# Patient Record
Sex: FEMALE | Race: WHITE | NOT HISPANIC OR LATINO | Employment: OTHER | URBAN - METROPOLITAN AREA
[De-identification: names, ages, dates, MRNs, and addresses within clinical notes are randomized per-mention and may not be internally consistent; named-entity substitution may affect disease eponyms.]

---

## 2018-07-19 ENCOUNTER — OFFICE VISIT CONVERTED (OUTPATIENT)
Dept: GASTROENTEROLOGY | Facility: CLINIC | Age: 63
End: 2018-07-19
Attending: INTERNAL MEDICINE

## 2019-02-06 ENCOUNTER — OFFICE VISIT CONVERTED (OUTPATIENT)
Dept: GASTROENTEROLOGY | Facility: CLINIC | Age: 64
End: 2019-02-06
Attending: INTERNAL MEDICINE

## 2019-06-03 ENCOUNTER — HOSPITAL ENCOUNTER (OUTPATIENT)
Dept: GASTROENTEROLOGY | Facility: HOSPITAL | Age: 64
Setting detail: HOSPITAL OUTPATIENT SURGERY
Discharge: HOME | End: 2019-06-03
Attending: INTERNAL MEDICINE

## 2019-07-03 ENCOUNTER — HOSPITAL ENCOUNTER (OUTPATIENT)
Dept: GENERAL RADIOLOGY | Facility: HOSPITAL | Age: 64
Discharge: HOME | End: 2019-07-03
Attending: OBSTETRICS & GYNECOLOGY

## 2019-11-25 ENCOUNTER — HOSPITAL ENCOUNTER (OUTPATIENT)
Dept: LAB | Facility: HOSPITAL | Age: 64
Discharge: HOME | End: 2019-11-25
Attending: INTERNAL MEDICINE

## 2019-11-25 LAB
25(OH)D3 SERPL-MCNC: 19.7 NG/ML (ref 30–100)
CALCIUM SERPL-MCNC: 9.3 MG/DL (ref 8.7–10.4)
CREAT UR-MCNC: 0.84 MG/DL (ref 0.5–0.9)
TSH SERPL-ACNC: 1.19 M[IU]/L (ref 0.27–4.2)

## 2020-02-10 ENCOUNTER — HOSPITAL ENCOUNTER (OUTPATIENT)
Dept: LAB | Facility: HOSPITAL | Age: 65
Discharge: HOME | End: 2020-02-10
Attending: INTERNAL MEDICINE

## 2020-02-10 LAB
25(OH)D3 SERPL-MCNC: 24.3 NG/ML (ref 30–100)
CALCIUM SERPL-MCNC: 9.1 MG/DL (ref 8.7–10.4)
CREAT UR-MCNC: 0.86 MG/DL (ref 0.5–0.9)

## 2020-09-10 ENCOUNTER — HOSPITAL ENCOUNTER (OUTPATIENT)
Dept: LAB | Facility: HOSPITAL | Age: 65
Discharge: HOME | End: 2020-09-10
Attending: INTERNAL MEDICINE

## 2020-09-10 LAB
25(OH)D3 SERPL-MCNC: 36.5 NG/ML (ref 30–100)
CALCIUM SERPL-MCNC: 8.8 MG/DL (ref 8.7–10.4)
CREAT UR-MCNC: 0.89 MG/DL (ref 0.5–0.9)

## 2021-01-21 ENCOUNTER — TELEPHONE CONVERTED (OUTPATIENT)
Dept: GASTROENTEROLOGY | Facility: CLINIC | Age: 66
End: 2021-01-21
Attending: NURSE PRACTITIONER

## 2021-02-27 ENCOUNTER — HOSPITAL ENCOUNTER (OUTPATIENT)
Dept: PREADMISSION TESTING | Facility: HOSPITAL | Age: 66
Discharge: HOME | End: 2021-02-27
Attending: INTERNAL MEDICINE

## 2021-02-27 LAB — SARS-COV-2 RNA SPEC QL NAA+PROBE: NOT DETECTED

## 2021-03-02 ENCOUNTER — HOSPITAL ENCOUNTER (OUTPATIENT)
Dept: LAB | Facility: HOSPITAL | Age: 66
Discharge: HOME | End: 2021-03-02
Attending: INTERNAL MEDICINE

## 2021-03-03 LAB
25(OH)D3 SERPL-MCNC: 38.5 NG/ML (ref 30–100)
CALCIUM SERPL-MCNC: 8.7 MG/DL (ref 8.7–10.4)
CREAT UR-MCNC: 0.91 MG/DL (ref 0.5–0.9)

## 2021-03-04 ENCOUNTER — HOSPITAL ENCOUNTER (OUTPATIENT)
Dept: GASTROENTEROLOGY | Facility: HOSPITAL | Age: 66
Setting detail: HOSPITAL OUTPATIENT SURGERY
Discharge: HOME | End: 2021-03-04
Attending: INTERNAL MEDICINE

## 2021-05-14 NOTE — PROGRESS NOTES
Progress Note      Patient Name: Jana Schoenbaechler   Patient ID: [id number]   Sex: Female   YOB: 1955    Primary Care Provider: Chucho Colon MD    Visit Date: January 21, 2021    Provider: KEILA Cannon   [facility]   Location Address: [address] [place], [place] [address]  [id number]   Location Phone: [phone]          Chief Complaint  · Chowdary's Esophagus  · Colon Polyps  · IBS   · Reflux      History Of Present Illness  TELEHEALTH TELEPHONE VISIT  Chief Complaint: Chowdary's Esophagus, reflex, IBS   Jana I. Schoenbaechler is a 65 year old /White female who is presenting for evaluation via telehealth telephone visit. Verbal consent obtained before beginning visit.   Provider spent 11 minutes with the patient during the telehealth visit.   The following staff were present during this visit: Adeola Conn MA   Past Medical History/ Overview of Patient Symptoms     65-year-old female with a history of Chowdary's esophagus, colon polyps, reflux, IBS returns for follow-up after period of long absence.  She feels that her reflux is controlled with pantoprazole 40 mg daily.  She does have intermittent dysphagia that occurs with both solids and liquids.  She denies weight loss.  The dysphagia occurs a few times a week.  She has tried to quit smoking, and she now vapes.  She denies nausea, vomiting, and abdominal pain.  She is taking hyoscyamine, which helps with her spasms.  Review of her colonoscopy 06/2019 by Dr. Maurer revealed grade 1 internal hemorrhoids and a 4 mm hyperplastic polyp removed from the rectum. Recommend repeat colonoscopy 2024. EGD 08/2018 by Dr. Maurer revealed a small hiatal hernia, Chowdary's esophagus, and reflux esophagitis. Recommend repeat colonoscopy 2021.         Past Medical History  Chowdary's esophagus; Hiatal Hernia; Irritable bowel syndrome         Past Surgical History  Arthroscopic knee surgery, left;  Cholecstectomy; Colonoscopy; EGD         Medication List  hyoscyamine sulfate 0.125 mg sublingual tablet, sublingual; PANTOPRAZOLE SOD DR 40 MG TAB; Protonix 40 mg oral tablet,delayed release (DR/EC)         Allergy List  NO KNOWN DRUG ALLERGIES       Allergies Reconciled  Family Medical History  - No Family History of Colorectal Cancer         Social History  Alcohol (Former); Tobacco (Current every day)         Review of Systems  · Constitutional  o Denies  o : chills, fever  · Cardiovascular  o Denies  o : chest pain  · Respiratory  o Denies  o : shortness of breath  · Gastrointestinal  o Denies  o : nausea, vomiting, dysphagia  · Endocrine  o Denies  o : weight gain, weight loss          Assessment  · Chowdary's esophagus     530.85  · Colonic Polyps, Personal History of     V12.72  · Dysphagia     787.20/R13.10  · Gastroesophageal Reflux     530.81/K21.9  · Irritable Bowel Syndrome     564.1/K58.9      Plan  · Orders  o Physician Telephone Evaluation, 11-20 minutes (57303) - 530.85, 530.81/K21.9, 564.1/K58.9, 787.20/R13.10 - 01/21/2021  o Consent for Esophagogastrodudodenoscopy (EGD) with dilatation -Possible risk/complications, benefits, and alternatives to surgical or invasive procedure have been explained to patient and/or legal gaurdian. -Patient has been evaluated and can tolerate anesthesia and/or sedation. Risk, benefits, and alternatives to anesthesia and sedation have been explained to patient and/or legal gaurdian. (80985) - 530.85, 530.81/K21.9, 787.20/R13.10 - 01/21/2021  · Medications  o pantoprazole 40 mg oral tablet,delayed release (DR/EC)   SIG: take 1 tablet (40 mg) by oral route once daily for 90 days   DISP: (90) Tablet with 3 refills  Prescribed on 01/21/2021     o hyoscyamine sulfate 0.125 mg sublingual tablet, sublingual   SIG: DISSOLVE ONE TABLET UNDER THE TONGUE BEFORE MEAL(S) AND AT BEDTIME AS NEEDED for 30 days   DISP: (120) Capsule with 5 refills  Refilled on 01/21/2021      o PANTOPRAZOLE SOD DR 40 MG TAB   SIG: TAKE 1 TABLET BY MOUTH EVERY DAY   DISP: (30) Tablet with 0 refills  Discontinued on 01/21/2021     o Medications have been Reconciled  o Transition of Care or Provider Policy  · Instructions  o 65-year-old female with a history of Chowdary's esophagus, colon polyps, reflux, and IBS agrees to telehealth visit. She states that she has had dysphagia this been present for many years. She is to continue her pantoprazole 40 mg daily and hyoscyamine. We have discussed the need for an EGD due to her history of Chowdary's. She has been informed that Covid testing is required prior to the procedure. The patient is agreeable to proceed with scheduling an EGD            Electronically Signed by: KEILA Cannon -Author on January 21, 2021 08:20:29 AM  Electronically Co-signed by: Vito Maurer MD -Reviewer on February 9, 2021 09:02:05 PM

## 2021-05-16 VITALS
RESPIRATION RATE: 12 BRPM | OXYGEN SATURATION: 94 % | DIASTOLIC BLOOD PRESSURE: 75 MMHG | SYSTOLIC BLOOD PRESSURE: 168 MMHG | BODY MASS INDEX: 27.38 KG/M2 | HEIGHT: 66 IN | WEIGHT: 170.37 LBS | HEART RATE: 55 BPM

## 2021-05-16 VITALS
BODY MASS INDEX: 28.65 KG/M2 | DIASTOLIC BLOOD PRESSURE: 70 MMHG | SYSTOLIC BLOOD PRESSURE: 186 MMHG | OXYGEN SATURATION: 100 % | RESPIRATION RATE: 12 BRPM | WEIGHT: 178.25 LBS | HEIGHT: 66 IN | HEART RATE: 44 BPM

## 2021-05-23 ENCOUNTER — TRANSCRIBE ORDERS (OUTPATIENT)
Dept: ADMINISTRATIVE | Facility: HOSPITAL | Age: 66
End: 2021-05-23

## 2021-05-23 DIAGNOSIS — Z78.0 POST-MENOPAUSE: Primary | ICD-10-CM

## 2021-07-07 ENCOUNTER — HOSPITAL ENCOUNTER (OUTPATIENT)
Dept: BONE DENSITY | Facility: HOSPITAL | Age: 66
Discharge: HOME | End: 2021-07-07
Admitting: INTERNAL MEDICINE

## 2021-07-07 DIAGNOSIS — Z78.0 POST-MENOPAUSE: ICD-10-CM

## 2021-07-07 PROCEDURE — 77080 DXA BONE DENSITY AXIAL: CPT

## 2021-07-07 PROCEDURE — 77080 DXA BONE DENSITY AXIAL: CPT | Performed by: RADIOLOGY

## 2021-09-08 ENCOUNTER — TRANSCRIBE ORDERS (OUTPATIENT)
Dept: LAB | Facility: HOSPITAL | Age: 66
End: 2021-09-08

## 2021-09-08 ENCOUNTER — LAB (OUTPATIENT)
Dept: LAB | Facility: HOSPITAL | Age: 66
End: 2021-09-08

## 2021-09-08 DIAGNOSIS — M81.0 SENILE OSTEOPOROSIS: Primary | ICD-10-CM

## 2021-09-08 DIAGNOSIS — Z79.899 ENCOUNTER FOR LONG-TERM (CURRENT) USE OF OTHER MEDICATIONS: ICD-10-CM

## 2021-09-08 DIAGNOSIS — M81.0 SENILE OSTEOPOROSIS: ICD-10-CM

## 2021-09-08 LAB
25(OH)D3 SERPL-MCNC: 48 NG/ML (ref 30–100)
CALCIUM SPEC-SCNC: 8.5 MG/DL (ref 8.6–10.5)
CREAT SERPL-MCNC: 1.04 MG/DL (ref 0.57–1)
GFR SERPL CREATININE-BSD FRML MDRD: 53 ML/MIN/1.73

## 2021-09-08 PROCEDURE — 82565 ASSAY OF CREATININE: CPT

## 2021-09-08 PROCEDURE — 36415 COLL VENOUS BLD VENIPUNCTURE: CPT

## 2021-09-08 PROCEDURE — 82306 VITAMIN D 25 HYDROXY: CPT

## 2021-09-08 PROCEDURE — 82310 ASSAY OF CALCIUM: CPT

## 2021-10-25 ENCOUNTER — APPOINTMENT (OUTPATIENT)
Dept: CARDIOLOGY | Facility: HOSPITAL | Age: 66
End: 2021-10-25

## 2021-10-25 ENCOUNTER — HOSPITAL ENCOUNTER (INPATIENT)
Facility: HOSPITAL | Age: 66
LOS: 2 days | Discharge: HOME | End: 2021-10-27
Attending: EMERGENCY MEDICINE | Admitting: INTERNAL MEDICINE

## 2021-10-25 ENCOUNTER — APPOINTMENT (OUTPATIENT)
Dept: GENERAL RADIOLOGY | Facility: HOSPITAL | Age: 66
End: 2021-10-25

## 2021-10-25 ENCOUNTER — APPOINTMENT (OUTPATIENT)
Dept: CT IMAGING | Facility: HOSPITAL | Age: 66
End: 2021-10-25

## 2021-10-25 DIAGNOSIS — R07.9 CHEST PAIN IN ADULT: Primary | ICD-10-CM

## 2021-10-25 DIAGNOSIS — I50.21 ACUTE HFREF (HEART FAILURE WITH REDUCED EJECTION FRACTION) (HCC): ICD-10-CM

## 2021-10-25 DIAGNOSIS — I44.2 THIRD DEGREE HEART BLOCK (HCC): ICD-10-CM

## 2021-10-25 DIAGNOSIS — I44.2 COMPLETE HEART BLOCK (HCC): ICD-10-CM

## 2021-10-25 PROBLEM — I10 ESSENTIAL HYPERTENSION: Status: ACTIVE | Noted: 2021-10-25

## 2021-10-25 LAB
ALBUMIN SERPL-MCNC: 4.3 G/DL (ref 3.5–5.2)
ALBUMIN/GLOB SERPL: 1.4 G/DL
ALP SERPL-CCNC: 62 U/L (ref 39–117)
ALT SERPL W P-5'-P-CCNC: 20 U/L (ref 1–33)
ANION GAP SERPL CALCULATED.3IONS-SCNC: 9.7 MMOL/L (ref 5–15)
AST SERPL-CCNC: 20 U/L (ref 1–32)
BASOPHILS # BLD AUTO: 0.05 10*3/MM3 (ref 0–0.2)
BASOPHILS NFR BLD AUTO: 0.8 % (ref 0–1.5)
BH CV ECHO MEAS - AO MAX PG: 15 MMHG
BH CV ECHO MEAS - AO MEAN PG: 6 MMHG
BH CV ECHO MEAS - AO ROOT DIAM: 2.7 CM
BH CV ECHO MEAS - AO V2 MAX: 196 CM/SEC
BH CV ECHO MEAS - AO V2 VTI: 45 CM
BH CV ECHO MEAS - AVA PLANIMETRY TRACED: 1.7 CM2
BH CV ECHO MEAS - EDV(MOD-SP2): 104 ML
BH CV ECHO MEAS - EDV(MOD-SP4): 141 ML
BH CV ECHO MEAS - ESV(MOD-SP2): 65 ML
BH CV ECHO MEAS - ESV(MOD-SP4): 66 ML
BH CV ECHO MEAS - IVSD: 1.6 CM
BH CV ECHO MEAS - LA DIMENSION(2D): 4.5 CM
BH CV ECHO MEAS - LV MAX PG: 4 MMHG
BH CV ECHO MEAS - LV MEAN PG: 2 MMHG
BH CV ECHO MEAS - LV V1 MAX: 106 CM/SEC
BH CV ECHO MEAS - LV V1 VTI: 24 CM
BH CV ECHO MEAS - LVIDD: 4.7 CM
BH CV ECHO MEAS - LVIDS: 3.1 CM
BH CV ECHO MEAS - LVOT DIAM: 2 CM
BH CV ECHO MEAS - LVPWD: 1.3 CM
BH CV ECHO MEAS - MV MAX PG: 11 MMHG
BH CV ECHO MEAS - MV MEAN PG: 3 MMHG
BH CV ECHO MEAS - MV P1/2T: 81 MSEC
BH CV ECHO MEAS - MV V2 VTI: 62 CM
BH CV ECHO MEAS - MVA(P1/2T): 2.7 CM2
BH CV ECHO MEAS - RAP SYSTOLE: 3 MMHG
BH CV ECHO MEAS - RVDD: 2.3 CM
BH CV ECHO MEAS - RVSP: 60 MMHG
BH CV ECHO MEAS - TR MAX PG: 57 MMHG
BH CV ECHO MEAS - TR MAX VEL: 376 CM/SEC
BILIRUB SERPL-MCNC: 0.7 MG/DL (ref 0–1.2)
BUN SERPL-MCNC: 19 MG/DL (ref 8–23)
BUN/CREAT SERPL: 18.1 (ref 7–25)
CALCIUM SPEC-SCNC: 8.8 MG/DL (ref 8.6–10.5)
CHLORIDE SERPL-SCNC: 107 MMOL/L (ref 98–107)
CK MB SERPL-CCNC: <1 NG/ML
CK SERPL-CCNC: 39 U/L (ref 20–180)
CO2 SERPL-SCNC: 22.3 MMOL/L (ref 22–29)
CREAT SERPL-MCNC: 1.05 MG/DL (ref 0.57–1)
D DIMER PPP FEU-MCNC: 0.47 MG/L (FEU) (ref 0–0.59)
DEPRECATED RDW RBC AUTO: 40.4 FL (ref 37–54)
EOSINOPHIL # BLD AUTO: 0.2 10*3/MM3 (ref 0–0.4)
EOSINOPHIL NFR BLD AUTO: 3.1 % (ref 0.3–6.2)
ERYTHROCYTE [DISTWIDTH] IN BLOOD BY AUTOMATED COUNT: 12.8 % (ref 12.3–15.4)
GFR SERPL CREATININE-BSD FRML MDRD: 53 ML/MIN/1.73
GLOBULIN UR ELPH-MCNC: 3 GM/DL
GLUCOSE SERPL-MCNC: 97 MG/DL (ref 65–99)
HCT VFR BLD AUTO: 44.2 % (ref 34–46.6)
HGB BLD-MCNC: 14.7 G/DL (ref 12–15.9)
HOLD SPECIMEN: NORMAL
HOLD SPECIMEN: NORMAL
IMM GRANULOCYTES # BLD AUTO: 0.01 10*3/MM3 (ref 0–0.05)
IMM GRANULOCYTES NFR BLD AUTO: 0.2 % (ref 0–0.5)
LEFT ATRIUM VOLUME INDEX: 44.7 ML/M2
LIPASE SERPL-CCNC: 37 U/L (ref 13–60)
LV EF 2D ECHO EST: 45 %
LYMPHOCYTES # BLD AUTO: 1.92 10*3/MM3 (ref 0.7–3.1)
LYMPHOCYTES NFR BLD AUTO: 29.3 % (ref 19.6–45.3)
MAGNESIUM SERPL-MCNC: 2 MG/DL (ref 1.6–2.4)
MAXIMAL PREDICTED HEART RATE: 155 BPM
MCH RBC QN AUTO: 29 PG (ref 26.6–33)
MCHC RBC AUTO-ENTMCNC: 33.3 G/DL (ref 31.5–35.7)
MCV RBC AUTO: 87.2 FL (ref 79–97)
MONOCYTES # BLD AUTO: 0.62 10*3/MM3 (ref 0.1–0.9)
MONOCYTES NFR BLD AUTO: 9.5 % (ref 5–12)
NEUTROPHILS NFR BLD AUTO: 3.75 10*3/MM3 (ref 1.7–7)
NEUTROPHILS NFR BLD AUTO: 57.1 % (ref 42.7–76)
NRBC BLD AUTO-RTO: 0 /100 WBC (ref 0–0.2)
NT-PROBNP SERPL-MCNC: 2605 PG/ML (ref 0–900)
PLATELET # BLD AUTO: 320 10*3/MM3 (ref 140–450)
PMV BLD AUTO: 10.3 FL (ref 6–12)
POTASSIUM SERPL-SCNC: 4.7 MMOL/L (ref 3.5–5.2)
PROT SERPL-MCNC: 7.3 G/DL (ref 6–8.5)
QT INTERVAL: 562 MS
QT INTERVAL: 578 MS
RBC # BLD AUTO: 5.07 10*6/MM3 (ref 3.77–5.28)
SODIUM SERPL-SCNC: 139 MMOL/L (ref 136–145)
STRESS TARGET HR: 132 BPM
TROPONIN I SERPL-MCNC: 0.01 NG/ML (ref 0–0.6)
TROPONIN I SERPL-MCNC: 0.08 NG/ML (ref 0–0.6)
TROPONIN T SERPL-MCNC: 0.06 NG/ML (ref 0–0.03)
WBC # BLD AUTO: 6.55 10*3/MM3 (ref 3.4–10.8)
WHOLE BLOOD HOLD SPECIMEN: NORMAL
WHOLE BLOOD HOLD SPECIMEN: NORMAL

## 2021-10-25 PROCEDURE — 83880 ASSAY OF NATRIURETIC PEPTIDE: CPT

## 2021-10-25 PROCEDURE — 83690 ASSAY OF LIPASE: CPT

## 2021-10-25 PROCEDURE — 83735 ASSAY OF MAGNESIUM: CPT

## 2021-10-25 PROCEDURE — 84484 ASSAY OF TROPONIN QUANT: CPT | Performed by: INTERNAL MEDICINE

## 2021-10-25 PROCEDURE — 85379 FIBRIN DEGRADATION QUANT: CPT | Performed by: EMERGENCY MEDICINE

## 2021-10-25 PROCEDURE — 99285 EMERGENCY DEPT VISIT HI MDM: CPT

## 2021-10-25 PROCEDURE — 80053 COMPREHEN METABOLIC PANEL: CPT

## 2021-10-25 PROCEDURE — 71045 X-RAY EXAM CHEST 1 VIEW: CPT

## 2021-10-25 PROCEDURE — 82553 CREATINE MB FRACTION: CPT

## 2021-10-25 PROCEDURE — 93306 TTE W/DOPPLER COMPLETE: CPT | Performed by: INTERNAL MEDICINE

## 2021-10-25 PROCEDURE — 93306 TTE W/DOPPLER COMPLETE: CPT

## 2021-10-25 PROCEDURE — 84484 ASSAY OF TROPONIN QUANT: CPT

## 2021-10-25 PROCEDURE — 99222 1ST HOSP IP/OBS MODERATE 55: CPT | Performed by: INTERNAL MEDICINE

## 2021-10-25 PROCEDURE — 82550 ASSAY OF CK (CPK): CPT

## 2021-10-25 PROCEDURE — 85025 COMPLETE CBC W/AUTO DIFF WBC: CPT

## 2021-10-25 PROCEDURE — 71250 CT THORAX DX C-: CPT

## 2021-10-25 PROCEDURE — 93005 ELECTROCARDIOGRAM TRACING: CPT

## 2021-10-25 PROCEDURE — 99223 1ST HOSP IP/OBS HIGH 75: CPT | Performed by: INTERNAL MEDICINE

## 2021-10-25 RX ORDER — BISACODYL 10 MG
10 SUPPOSITORY, RECTAL RECTAL DAILY PRN
Status: DISCONTINUED | OUTPATIENT
Start: 2021-10-25 | End: 2021-10-27 | Stop reason: HOSPADM

## 2021-10-25 RX ORDER — SODIUM CHLORIDE 0.9 % (FLUSH) 0.9 %
10 SYRINGE (ML) INJECTION AS NEEDED
Status: DISCONTINUED | OUTPATIENT
Start: 2021-10-25 | End: 2021-10-25

## 2021-10-25 RX ORDER — CEFAZOLIN SODIUM 2 G/100ML
2 INJECTION, SOLUTION INTRAVENOUS ONCE
Status: CANCELLED | OUTPATIENT
Start: 2021-10-25 | End: 2021-10-25

## 2021-10-25 RX ORDER — HYOSCYAMINE SULFATE 0.125 MG
0.12 TABLET ORAL EVERY 4 HOURS PRN
COMMUNITY
End: 2022-12-13 | Stop reason: SDUPTHER

## 2021-10-25 RX ORDER — NETARSUDIL 0.2 MG/ML
1 SOLUTION/ DROPS OPHTHALMIC; TOPICAL DAILY
Status: ON HOLD | COMMUNITY
End: 2023-03-16

## 2021-10-25 RX ORDER — PANTOPRAZOLE SODIUM 40 MG/1
40 TABLET, DELAYED RELEASE ORAL DAILY
Status: DISCONTINUED | OUTPATIENT
Start: 2021-10-25 | End: 2021-10-27 | Stop reason: HOSPADM

## 2021-10-25 RX ORDER — LATANOPROST 50 UG/ML
1 SOLUTION/ DROPS OPHTHALMIC NIGHTLY
Status: DISCONTINUED | OUTPATIENT
Start: 2021-10-25 | End: 2021-10-27 | Stop reason: HOSPADM

## 2021-10-25 RX ORDER — DORZOLAMIDE HYDROCHLORIDE AND TIMOLOL MALEATE 20; 5 MG/ML; MG/ML
1 SOLUTION/ DROPS OPHTHALMIC 2 TIMES DAILY
COMMUNITY
Start: 2021-08-06

## 2021-10-25 RX ORDER — ALENDRONATE SODIUM 70 MG/1
1 TABLET ORAL
COMMUNITY
Start: 2021-09-22 | End: 2023-02-28

## 2021-10-25 RX ORDER — ONDANSETRON 4 MG/1
4 TABLET, FILM COATED ORAL EVERY 6 HOURS PRN
Status: DISCONTINUED | OUTPATIENT
Start: 2021-10-25 | End: 2021-10-27 | Stop reason: HOSPADM

## 2021-10-25 RX ORDER — POLYETHYLENE GLYCOL 3350 17 G/17G
17 POWDER, FOR SOLUTION ORAL DAILY PRN
Status: DISCONTINUED | OUTPATIENT
Start: 2021-10-25 | End: 2021-10-27 | Stop reason: HOSPADM

## 2021-10-25 RX ORDER — DORZOLAMIDE HYDROCHLORIDE AND TIMOLOL MALEATE 20; 5 MG/ML; MG/ML
SOLUTION/ DROPS OPHTHALMIC 2 TIMES DAILY
Status: DISCONTINUED | OUTPATIENT
Start: 2021-10-25 | End: 2021-10-25

## 2021-10-25 RX ORDER — ASPIRIN 81 MG/1
324 TABLET, CHEWABLE ORAL ONCE
Status: COMPLETED | OUTPATIENT
Start: 2021-10-25 | End: 2021-10-25

## 2021-10-25 RX ORDER — NITROGLYCERIN 0.4 MG/1
0.4 TABLET SUBLINGUAL
Status: DISCONTINUED | OUTPATIENT
Start: 2021-10-25 | End: 2021-10-27 | Stop reason: HOSPADM

## 2021-10-25 RX ORDER — DORZOLAMIDE HYDROCHLORIDE AND TIMOLOL MALEATE 20; 5 MG/ML; MG/ML
1 SOLUTION/ DROPS OPHTHALMIC 2 TIMES DAILY
Status: DISCONTINUED | OUTPATIENT
Start: 2021-10-25 | End: 2021-10-27 | Stop reason: HOSPADM

## 2021-10-25 RX ORDER — AMOXICILLIN 250 MG
2 CAPSULE ORAL 2 TIMES DAILY
Status: DISCONTINUED | OUTPATIENT
Start: 2021-10-25 | End: 2021-10-27 | Stop reason: HOSPADM

## 2021-10-25 RX ORDER — BRIMONIDINE TARTRATE 2 MG/ML
SOLUTION/ DROPS OPHTHALMIC
COMMUNITY
Start: 2021-08-06 | End: 2021-10-25

## 2021-10-25 RX ORDER — SODIUM CHLORIDE 0.9 % (FLUSH) 0.9 %
10 SYRINGE (ML) INJECTION EVERY 12 HOURS SCHEDULED
Status: DISCONTINUED | OUTPATIENT
Start: 2021-10-25 | End: 2021-10-27 | Stop reason: HOSPADM

## 2021-10-25 RX ORDER — ALPRAZOLAM 0.25 MG/1
0.5 TABLET ORAL ONCE
Status: COMPLETED | OUTPATIENT
Start: 2021-10-25 | End: 2021-10-25

## 2021-10-25 RX ORDER — ERGOCALCIFEROL 1.25 MG/1
50000 CAPSULE ORAL TAKE AS DIRECTED
COMMUNITY
Start: 2021-09-22

## 2021-10-25 RX ORDER — BISACODYL 5 MG/1
5 TABLET, DELAYED RELEASE ORAL DAILY PRN
Status: DISCONTINUED | OUTPATIENT
Start: 2021-10-25 | End: 2021-10-27 | Stop reason: HOSPADM

## 2021-10-25 RX ORDER — BRIMONIDINE TARTRATE 2 MG/ML
1 SOLUTION/ DROPS OPHTHALMIC 2 TIMES DAILY
COMMUNITY
Start: 2021-08-06

## 2021-10-25 RX ORDER — LATANOPROST 50 UG/ML
1 SOLUTION/ DROPS OPHTHALMIC NIGHTLY
COMMUNITY

## 2021-10-25 RX ORDER — MELATONIN
1.25 TAKE AS DIRECTED
COMMUNITY

## 2021-10-25 RX ORDER — CALCIUM CARBONATE 200(500)MG
1 TABLET,CHEWABLE ORAL DAILY
COMMUNITY

## 2021-10-25 RX ORDER — PANTOPRAZOLE SODIUM 40 MG/1
40 TABLET, DELAYED RELEASE ORAL DAILY
COMMUNITY
End: 2022-04-26 | Stop reason: SDUPTHER

## 2021-10-25 RX ORDER — SODIUM CHLORIDE 0.9 % (FLUSH) 0.9 %
10 SYRINGE (ML) INJECTION AS NEEDED
Status: DISCONTINUED | OUTPATIENT
Start: 2021-10-25 | End: 2021-10-26

## 2021-10-25 RX ORDER — HYOSCYAMINE SULFATE 0.125 MG
125 TABLET ORAL EVERY 4 HOURS PRN
Status: DISCONTINUED | OUTPATIENT
Start: 2021-10-25 | End: 2021-10-27 | Stop reason: HOSPADM

## 2021-10-25 RX ORDER — DORZOLAMIDE HYDROCHLORIDE AND TIMOLOL MALEATE 20; 5 MG/ML; MG/ML
SOLUTION/ DROPS OPHTHALMIC
COMMUNITY
Start: 2021-08-06 | End: 2021-10-25

## 2021-10-25 RX ADMIN — NETARSUDIL 1 DROP: 0.2 SOLUTION/ DROPS OPHTHALMIC; TOPICAL at 19:20

## 2021-10-25 RX ADMIN — PANTOPRAZOLE SODIUM 40 MG: 40 TABLET, DELAYED RELEASE ORAL at 20:48

## 2021-10-25 RX ADMIN — DORZOLAMIDE HYDROCHLORIDE AND TIMOLOL MALEATE 1 DROP: 22.3; 6.8 SOLUTION/ DROPS OPHTHALMIC at 20:47

## 2021-10-25 RX ADMIN — LATANOPROST 1 DROP: 50 SOLUTION/ DROPS OPHTHALMIC at 20:47

## 2021-10-25 RX ADMIN — ALPRAZOLAM 0.5 MG: 0.25 TABLET ORAL at 19:20

## 2021-10-25 RX ADMIN — SODIUM CHLORIDE, PRESERVATIVE FREE 10 ML: 5 INJECTION INTRAVENOUS at 20:51

## 2021-10-25 RX ADMIN — ASPIRIN 324 MG: 81 TABLET, CHEWABLE ORAL at 09:36

## 2021-10-26 ENCOUNTER — APPOINTMENT (OUTPATIENT)
Dept: GENERAL RADIOLOGY | Facility: HOSPITAL | Age: 66
End: 2021-10-26

## 2021-10-26 ENCOUNTER — HOSPITAL ENCOUNTER (OUTPATIENT)
Facility: HOSPITAL | Age: 66
Setting detail: HOSPITAL OUTPATIENT SURGERY
End: 2021-10-26
Attending: INTERNAL MEDICINE | Admitting: INTERNAL MEDICINE

## 2021-10-26 DIAGNOSIS — I50.21 ACUTE HFREF (HEART FAILURE WITH REDUCED EJECTION FRACTION) (HCC): ICD-10-CM

## 2021-10-26 LAB
ANION GAP SERPL CALCULATED.3IONS-SCNC: 9.9 MMOL/L (ref 5–15)
BUN SERPL-MCNC: 19 MG/DL (ref 8–23)
BUN/CREAT SERPL: 21.1 (ref 7–25)
CALCIUM SPEC-SCNC: 8.3 MG/DL (ref 8.6–10.5)
CHLORIDE SERPL-SCNC: 107 MMOL/L (ref 98–107)
CO2 SERPL-SCNC: 22.1 MMOL/L (ref 22–29)
CREAT SERPL-MCNC: 0.9 MG/DL (ref 0.57–1)
DEPRECATED RDW RBC AUTO: 41.8 FL (ref 37–54)
ERYTHROCYTE [DISTWIDTH] IN BLOOD BY AUTOMATED COUNT: 12.9 % (ref 12.3–15.4)
GFR SERPL CREATININE-BSD FRML MDRD: 63 ML/MIN/1.73
GLUCOSE SERPL-MCNC: 93 MG/DL (ref 65–99)
HCT VFR BLD AUTO: 41.5 % (ref 34–46.6)
HGB BLD-MCNC: 13.6 G/DL (ref 12–15.9)
INR PPP: 1 (ref 2–3)
MAGNESIUM SERPL-MCNC: 1.9 MG/DL (ref 1.6–2.4)
MCH RBC QN AUTO: 29.5 PG (ref 26.6–33)
MCHC RBC AUTO-ENTMCNC: 32.8 G/DL (ref 31.5–35.7)
MCV RBC AUTO: 90 FL (ref 79–97)
PLATELET # BLD AUTO: 276 10*3/MM3 (ref 140–450)
PMV BLD AUTO: 10.8 FL (ref 6–12)
POTASSIUM SERPL-SCNC: 4.3 MMOL/L (ref 3.5–5.2)
PROTHROMBIN TIME: 11 SECONDS (ref 9.4–12)
RBC # BLD AUTO: 4.61 10*6/MM3 (ref 3.77–5.28)
SODIUM SERPL-SCNC: 139 MMOL/L (ref 136–145)
TROPONIN T SERPL-MCNC: 0.01 NG/ML (ref 0–0.03)
WBC # BLD AUTO: 6.51 10*3/MM3 (ref 3.4–10.8)

## 2021-10-26 PROCEDURE — C1894 INTRO/SHEATH, NON-LASER: HCPCS | Performed by: INTERNAL MEDICINE

## 2021-10-26 PROCEDURE — 99152 MOD SED SAME PHYS/QHP 5/>YRS: CPT | Performed by: INTERNAL MEDICINE

## 2021-10-26 PROCEDURE — B2111ZZ FLUOROSCOPY OF MULTIPLE CORONARY ARTERIES USING LOW OSMOLAR CONTRAST: ICD-10-PCS | Performed by: INTERNAL MEDICINE

## 2021-10-26 PROCEDURE — 0 MEPERIDINE PER 100 MG: Performed by: INTERNAL MEDICINE

## 2021-10-26 PROCEDURE — C1898 LEAD, PMKR, OTHER THAN TRANS: HCPCS | Performed by: INTERNAL MEDICINE

## 2021-10-26 PROCEDURE — C1785 PMKR, DUAL, RATE-RESP: HCPCS | Performed by: INTERNAL MEDICINE

## 2021-10-26 PROCEDURE — 0 CEFAZOLIN IN DEXTROSE 2-4 GM/100ML-% SOLUTION: Performed by: INTERNAL MEDICINE

## 2021-10-26 PROCEDURE — 71045 X-RAY EXAM CHEST 1 VIEW: CPT

## 2021-10-26 PROCEDURE — 85610 PROTHROMBIN TIME: CPT | Performed by: INTERNAL MEDICINE

## 2021-10-26 PROCEDURE — 25010000002 FENTANYL CITRATE (PF) 100 MCG/2ML SOLUTION: Performed by: INTERNAL MEDICINE

## 2021-10-26 PROCEDURE — C1892 INTRO/SHEATH,FIXED,PEEL-AWAY: HCPCS | Performed by: INTERNAL MEDICINE

## 2021-10-26 PROCEDURE — 25010000002 LORAZEPAM PER 2 MG: Performed by: INTERNAL MEDICINE

## 2021-10-26 PROCEDURE — 0 IOPAMIDOL PER 1 ML: Performed by: INTERNAL MEDICINE

## 2021-10-26 PROCEDURE — 93458 L HRT ARTERY/VENTRICLE ANGIO: CPT | Performed by: INTERNAL MEDICINE

## 2021-10-26 PROCEDURE — 99233 SBSQ HOSP IP/OBS HIGH 50: CPT | Performed by: INTERNAL MEDICINE

## 2021-10-26 PROCEDURE — 02H63JZ INSERTION OF PACEMAKER LEAD INTO RIGHT ATRIUM, PERCUTANEOUS APPROACH: ICD-10-PCS | Performed by: INTERNAL MEDICINE

## 2021-10-26 PROCEDURE — 84484 ASSAY OF TROPONIN QUANT: CPT | Performed by: INTERNAL MEDICINE

## 2021-10-26 PROCEDURE — 99232 SBSQ HOSP IP/OBS MODERATE 35: CPT | Performed by: INTERNAL MEDICINE

## 2021-10-26 PROCEDURE — B2151ZZ FLUOROSCOPY OF LEFT HEART USING LOW OSMOLAR CONTRAST: ICD-10-PCS | Performed by: INTERNAL MEDICINE

## 2021-10-26 PROCEDURE — 02HK3JZ INSERTION OF PACEMAKER LEAD INTO RIGHT VENTRICLE, PERCUTANEOUS APPROACH: ICD-10-PCS | Performed by: INTERNAL MEDICINE

## 2021-10-26 PROCEDURE — 25010000002 MIDAZOLAM PER 1MG: Performed by: INTERNAL MEDICINE

## 2021-10-26 PROCEDURE — 83735 ASSAY OF MAGNESIUM: CPT | Performed by: INTERNAL MEDICINE

## 2021-10-26 PROCEDURE — 85027 COMPLETE CBC AUTOMATED: CPT | Performed by: INTERNAL MEDICINE

## 2021-10-26 PROCEDURE — 0JH606Z INSERTION OF PACEMAKER, DUAL CHAMBER INTO CHEST SUBCUTANEOUS TISSUE AND FASCIA, OPEN APPROACH: ICD-10-PCS | Performed by: INTERNAL MEDICINE

## 2021-10-26 PROCEDURE — 80048 BASIC METABOLIC PNL TOTAL CA: CPT | Performed by: INTERNAL MEDICINE

## 2021-10-26 PROCEDURE — 33208 INSRT HEART PM ATRIAL & VENT: CPT | Performed by: INTERNAL MEDICINE

## 2021-10-26 PROCEDURE — 4A023N7 MEASUREMENT OF CARDIAC SAMPLING AND PRESSURE, LEFT HEART, PERCUTANEOUS APPROACH: ICD-10-PCS | Performed by: INTERNAL MEDICINE

## 2021-10-26 DEVICE — PACE/SENSE LEAD
Type: IMPLANTABLE DEVICE | Status: FUNCTIONAL
Brand: INGEVITY™+

## 2021-10-26 DEVICE — PACEMAKER
Type: IMPLANTABLE DEVICE | Status: FUNCTIONAL
Brand: ACCOLADE™ MRI DR

## 2021-10-26 RX ORDER — MEPERIDINE HYDROCHLORIDE 25 MG/ML
INJECTION INTRAMUSCULAR; INTRAVENOUS; SUBCUTANEOUS AS NEEDED
Status: DISCONTINUED | OUTPATIENT
Start: 2021-10-26 | End: 2021-10-26 | Stop reason: HOSPADM

## 2021-10-26 RX ORDER — SODIUM CHLORIDE 0.9 % (FLUSH) 0.9 %
10 SYRINGE (ML) INJECTION AS NEEDED
Status: DISCONTINUED | OUTPATIENT
Start: 2021-10-26 | End: 2021-10-27 | Stop reason: HOSPADM

## 2021-10-26 RX ORDER — BUPIVACAINE HCL/0.9 % NACL/PF 0.1 %
2 PLASTIC BAG, INJECTION (ML) EPIDURAL ONCE
Status: DISCONTINUED | OUTPATIENT
Start: 2021-10-26 | End: 2021-10-26

## 2021-10-26 RX ORDER — HYDROCODONE BITARTRATE AND ACETAMINOPHEN 5; 325 MG/1; MG/1
1 TABLET ORAL EVERY 6 HOURS PRN
Status: DISCONTINUED | OUTPATIENT
Start: 2021-10-26 | End: 2021-10-27 | Stop reason: HOSPADM

## 2021-10-26 RX ORDER — MORPHINE SULFATE 2 MG/ML
2 INJECTION, SOLUTION INTRAMUSCULAR; INTRAVENOUS EVERY 6 HOURS PRN
Status: DISCONTINUED | OUTPATIENT
Start: 2021-10-26 | End: 2021-10-27 | Stop reason: HOSPADM

## 2021-10-26 RX ORDER — TEMAZEPAM 15 MG/1
15 CAPSULE ORAL NIGHTLY PRN
Status: DISCONTINUED | OUTPATIENT
Start: 2021-10-26 | End: 2021-10-27 | Stop reason: HOSPADM

## 2021-10-26 RX ORDER — HYDROCODONE BITARTRATE AND ACETAMINOPHEN 10; 325 MG/1; MG/1
1 TABLET ORAL EVERY 6 HOURS PRN
Status: DISCONTINUED | OUTPATIENT
Start: 2021-10-26 | End: 2021-10-27 | Stop reason: HOSPADM

## 2021-10-26 RX ORDER — CEFAZOLIN SODIUM 2 G/100ML
2 INJECTION, SOLUTION INTRAVENOUS EVERY 8 HOURS
Status: DISCONTINUED | OUTPATIENT
Start: 2021-10-26 | End: 2021-10-26

## 2021-10-26 RX ORDER — SODIUM CHLORIDE 0.9 % (FLUSH) 0.9 %
3 SYRINGE (ML) INJECTION EVERY 12 HOURS SCHEDULED
Status: DISCONTINUED | OUTPATIENT
Start: 2021-10-26 | End: 2021-10-27 | Stop reason: HOSPADM

## 2021-10-26 RX ORDER — SODIUM CHLORIDE 9 MG/ML
INJECTION, SOLUTION INTRAVENOUS CONTINUOUS PRN
Status: COMPLETED | OUTPATIENT
Start: 2021-10-26 | End: 2021-10-26

## 2021-10-26 RX ORDER — MIDAZOLAM HYDROCHLORIDE 2 MG/2ML
INJECTION, SOLUTION INTRAMUSCULAR; INTRAVENOUS AS NEEDED
Status: DISCONTINUED | OUTPATIENT
Start: 2021-10-26 | End: 2021-10-26 | Stop reason: HOSPADM

## 2021-10-26 RX ORDER — CEFAZOLIN SODIUM 2 G/100ML
2 INJECTION, SOLUTION INTRAVENOUS ONCE
Status: DISCONTINUED | OUTPATIENT
Start: 2021-10-26 | End: 2021-10-26

## 2021-10-26 RX ORDER — ONDANSETRON 2 MG/ML
4 INJECTION INTRAMUSCULAR; INTRAVENOUS EVERY 6 HOURS PRN
Status: DISCONTINUED | OUTPATIENT
Start: 2021-10-26 | End: 2021-10-27 | Stop reason: HOSPADM

## 2021-10-26 RX ORDER — FENTANYL CITRATE 50 UG/ML
INJECTION, SOLUTION INTRAMUSCULAR; INTRAVENOUS AS NEEDED
Status: DISCONTINUED | OUTPATIENT
Start: 2021-10-26 | End: 2021-10-26 | Stop reason: HOSPADM

## 2021-10-26 RX ORDER — LORAZEPAM 2 MG/ML
INJECTION INTRAMUSCULAR AS NEEDED
Status: DISCONTINUED | OUTPATIENT
Start: 2021-10-26 | End: 2021-10-26 | Stop reason: HOSPADM

## 2021-10-26 RX ORDER — ACETAMINOPHEN 325 MG/1
650 TABLET ORAL EVERY 4 HOURS PRN
Status: DISCONTINUED | OUTPATIENT
Start: 2021-10-26 | End: 2021-10-27 | Stop reason: HOSPADM

## 2021-10-26 RX ORDER — LISINOPRIL 5 MG/1
5 TABLET ORAL
Status: DISCONTINUED | OUTPATIENT
Start: 2021-10-26 | End: 2021-10-27 | Stop reason: HOSPADM

## 2021-10-26 RX ORDER — LIDOCAINE HYDROCHLORIDE 20 MG/ML
INJECTION, SOLUTION INFILTRATION; PERINEURAL AS NEEDED
Status: DISCONTINUED | OUTPATIENT
Start: 2021-10-26 | End: 2021-10-26 | Stop reason: HOSPADM

## 2021-10-26 RX ORDER — ACETAMINOPHEN 650 MG/1
650 SUPPOSITORY RECTAL EVERY 4 HOURS PRN
Status: DISCONTINUED | OUTPATIENT
Start: 2021-10-26 | End: 2021-10-27 | Stop reason: HOSPADM

## 2021-10-26 RX ORDER — NITROGLYCERIN 400 UG/1
SPRAY ORAL AS NEEDED
Status: DISCONTINUED | OUTPATIENT
Start: 2021-10-26 | End: 2021-10-26 | Stop reason: HOSPADM

## 2021-10-26 RX ORDER — CEFAZOLIN SODIUM 2 G/100ML
2 INJECTION, SOLUTION INTRAVENOUS EVERY 8 HOURS
Status: COMPLETED | OUTPATIENT
Start: 2021-10-26 | End: 2021-10-27

## 2021-10-26 RX ADMIN — HYDROCODONE BITARTRATE AND ACETAMINOPHEN 1 TABLET: 10; 325 TABLET ORAL at 16:24

## 2021-10-26 RX ADMIN — SODIUM CHLORIDE, PRESERVATIVE FREE 10 ML: 5 INJECTION INTRAVENOUS at 21:15

## 2021-10-26 RX ADMIN — LATANOPROST 1 DROP: 50 SOLUTION/ DROPS OPHTHALMIC at 21:05

## 2021-10-26 RX ADMIN — Medication 2 G: at 10:30

## 2021-10-26 RX ADMIN — LISINOPRIL 5 MG: 5 TABLET ORAL at 15:18

## 2021-10-26 RX ADMIN — CEFAZOLIN SODIUM 2 G: 2 INJECTION, SOLUTION INTRAVENOUS at 21:04

## 2021-10-26 RX ADMIN — HYDROCODONE BITARTRATE AND ACETAMINOPHEN 1 TABLET: 10; 325 TABLET ORAL at 22:18

## 2021-10-26 RX ADMIN — SODIUM CHLORIDE, PRESERVATIVE FREE 10 ML: 5 INJECTION INTRAVENOUS at 21:05

## 2021-10-26 RX ADMIN — DORZOLAMIDE HYDROCHLORIDE AND TIMOLOL MALEATE 1 DROP: 22.3; 6.8 SOLUTION/ DROPS OPHTHALMIC at 21:05

## 2021-10-27 VITALS
RESPIRATION RATE: 18 BRPM | WEIGHT: 201.72 LBS | TEMPERATURE: 97.5 F | BODY MASS INDEX: 35.74 KG/M2 | HEIGHT: 63 IN | SYSTOLIC BLOOD PRESSURE: 120 MMHG | HEART RATE: 63 BPM | DIASTOLIC BLOOD PRESSURE: 57 MMHG | OXYGEN SATURATION: 95 %

## 2021-10-27 LAB
ANION GAP SERPL CALCULATED.3IONS-SCNC: 7.8 MMOL/L (ref 5–15)
BASOPHILS # BLD AUTO: 0.04 10*3/MM3 (ref 0–0.2)
BASOPHILS NFR BLD AUTO: 0.7 % (ref 0–1.5)
BUN SERPL-MCNC: 21 MG/DL (ref 8–23)
BUN/CREAT SERPL: 19.8 (ref 7–25)
CALCIUM SPEC-SCNC: 8.8 MG/DL (ref 8.6–10.5)
CHLORIDE SERPL-SCNC: 103 MMOL/L (ref 98–107)
CO2 SERPL-SCNC: 24.2 MMOL/L (ref 22–29)
CREAT SERPL-MCNC: 1.06 MG/DL (ref 0.57–1)
DEPRECATED RDW RBC AUTO: 41 FL (ref 37–54)
EOSINOPHIL # BLD AUTO: 0.09 10*3/MM3 (ref 0–0.4)
EOSINOPHIL NFR BLD AUTO: 1.6 % (ref 0.3–6.2)
ERYTHROCYTE [DISTWIDTH] IN BLOOD BY AUTOMATED COUNT: 12.8 % (ref 12.3–15.4)
GFR SERPL CREATININE-BSD FRML MDRD: 52 ML/MIN/1.73
GLUCOSE SERPL-MCNC: 113 MG/DL (ref 65–99)
HCT VFR BLD AUTO: 42.1 % (ref 34–46.6)
HGB BLD-MCNC: 14 G/DL (ref 12–15.9)
IMM GRANULOCYTES # BLD AUTO: 0.01 10*3/MM3 (ref 0–0.05)
IMM GRANULOCYTES NFR BLD AUTO: 0.2 % (ref 0–0.5)
LYMPHOCYTES # BLD AUTO: 1.01 10*3/MM3 (ref 0.7–3.1)
LYMPHOCYTES NFR BLD AUTO: 17.9 % (ref 19.6–45.3)
MCH RBC QN AUTO: 29.2 PG (ref 26.6–33)
MCHC RBC AUTO-ENTMCNC: 33.3 G/DL (ref 31.5–35.7)
MCV RBC AUTO: 87.7 FL (ref 79–97)
MONOCYTES # BLD AUTO: 0.7 10*3/MM3 (ref 0.1–0.9)
MONOCYTES NFR BLD AUTO: 12.4 % (ref 5–12)
NEUTROPHILS NFR BLD AUTO: 3.8 10*3/MM3 (ref 1.7–7)
NEUTROPHILS NFR BLD AUTO: 67.2 % (ref 42.7–76)
NRBC BLD AUTO-RTO: 0 /100 WBC (ref 0–0.2)
PLATELET # BLD AUTO: 276 10*3/MM3 (ref 140–450)
PMV BLD AUTO: 10.7 FL (ref 6–12)
POTASSIUM SERPL-SCNC: 5 MMOL/L (ref 3.5–5.2)
QT INTERVAL: 536 MS
RBC # BLD AUTO: 4.8 10*6/MM3 (ref 3.77–5.28)
SODIUM SERPL-SCNC: 135 MMOL/L (ref 136–145)
WBC # BLD AUTO: 5.65 10*3/MM3 (ref 3.4–10.8)

## 2021-10-27 PROCEDURE — 0 CEFAZOLIN IN DEXTROSE 2-4 GM/100ML-% SOLUTION: Performed by: INTERNAL MEDICINE

## 2021-10-27 PROCEDURE — 93005 ELECTROCARDIOGRAM TRACING: CPT | Performed by: INTERNAL MEDICINE

## 2021-10-27 PROCEDURE — 80048 BASIC METABOLIC PNL TOTAL CA: CPT | Performed by: INTERNAL MEDICINE

## 2021-10-27 PROCEDURE — 99239 HOSP IP/OBS DSCHRG MGMT >30: CPT | Performed by: INTERNAL MEDICINE

## 2021-10-27 PROCEDURE — 85025 COMPLETE CBC W/AUTO DIFF WBC: CPT | Performed by: INTERNAL MEDICINE

## 2021-10-27 PROCEDURE — 25010000002 CEFAZOLIN PER 500 MG: Performed by: INTERNAL MEDICINE

## 2021-10-27 RX ORDER — LISINOPRIL 5 MG/1
5 TABLET ORAL
Qty: 30 TABLET | Refills: 0 | Status: SHIPPED | OUTPATIENT
Start: 2021-10-28 | End: 2021-11-02 | Stop reason: SDUPTHER

## 2021-10-27 RX ORDER — HYDROCODONE BITARTRATE AND ACETAMINOPHEN 5; 325 MG/1; MG/1
1 TABLET ORAL EVERY 6 HOURS PRN
Qty: 12 TABLET | Refills: 0 | Status: SHIPPED | OUTPATIENT
Start: 2021-10-27 | End: 2022-05-11

## 2021-10-27 RX ORDER — ACETAMINOPHEN 325 MG/1
650 TABLET ORAL EVERY 4 HOURS PRN
Qty: 100 TABLET | Refills: 0 | Status: SHIPPED | OUTPATIENT
Start: 2021-10-27 | End: 2021-11-09

## 2021-10-27 RX ADMIN — SODIUM CHLORIDE, PRESERVATIVE FREE 10 ML: 5 INJECTION INTRAVENOUS at 08:47

## 2021-10-27 RX ADMIN — CEFAZOLIN SODIUM 2 G: 2 INJECTION, SOLUTION INTRAVENOUS at 05:43

## 2021-10-27 RX ADMIN — DORZOLAMIDE HYDROCHLORIDE AND TIMOLOL MALEATE 1 DROP: 22.3; 6.8 SOLUTION/ DROPS OPHTHALMIC at 08:48

## 2021-10-27 RX ADMIN — PANTOPRAZOLE SODIUM 40 MG: 40 TABLET, DELAYED RELEASE ORAL at 08:47

## 2021-10-27 RX ADMIN — NETARSUDIL 1 DROP: 0.2 SOLUTION/ DROPS OPHTHALMIC; TOPICAL at 08:49

## 2021-10-27 RX ADMIN — LISINOPRIL 5 MG: 5 TABLET ORAL at 08:47

## 2021-10-27 RX ADMIN — HYDROCODONE BITARTRATE AND ACETAMINOPHEN 1 TABLET: 10; 325 TABLET ORAL at 05:24

## 2021-10-28 ENCOUNTER — READMISSION MANAGEMENT (OUTPATIENT)
Dept: CALL CENTER | Facility: HOSPITAL | Age: 66
End: 2021-10-28

## 2021-10-28 NOTE — OUTREACH NOTE
Prep Survey      Responses   [facility] patient discharged from? [facility]   Is LACE score < 7 ? No   Emergency Room discharge w/ pulse ox? No   Eligibility Readm Mgmt   Discharge diagnosis Pacemaker placement    Does the patient have one of the following disease processes/diagnoses(primary or secondary)? Other   Does the patient have Home health ordered? No   Is there a DME ordered? No   Prep survey completed? Yes          Tamia Cortes RN         yes

## 2021-11-01 ENCOUNTER — READMISSION MANAGEMENT (OUTPATIENT)
Dept: CALL CENTER | Facility: HOSPITAL | Age: 66
End: 2021-11-01

## 2021-11-01 PROBLEM — R07.9 CHEST PAIN IN ADULT: Status: RESOLVED | Noted: 2021-10-25 | Resolved: 2021-11-01

## 2021-11-01 PROBLEM — M81.0 OSTEOPOROSIS: Status: ACTIVE | Noted: 2020-09-16

## 2021-11-01 PROBLEM — K44.9 HIATAL HERNIA: Status: ACTIVE | Noted: 2021-11-01

## 2021-11-01 PROBLEM — K22.70 BARRETT'S ESOPHAGUS: Status: ACTIVE | Noted: 2021-11-01

## 2021-11-01 PROBLEM — Z95.0 PRESENCE OF CARDIAC PACEMAKER: Status: ACTIVE | Noted: 2021-10-25

## 2021-11-01 PROBLEM — I50.22 CHRONIC HFREF (HEART FAILURE WITH REDUCED EJECTION FRACTION): Status: ACTIVE | Noted: 2021-10-25

## 2021-11-01 PROBLEM — K58.9 IRRITABLE BOWEL SYNDROME: Status: ACTIVE | Noted: 2021-11-01

## 2021-11-01 NOTE — PATIENT INSTRUCTIONS
"Low-Sodium Eating Plan  Sodium, which is an element that makes up salt, helps you maintain a healthy balance of fluids in your body. Too much sodium can increase your blood pressure and cause fluid and waste to be held in your body.  Your health care provider or dietitian may recommend following this plan if you have high blood pressure (hypertension), kidney disease, liver disease, or heart failure. Eating less sodium can help lower your blood pressure, reduce swelling, and protect your heart, liver, and kidneys.  What are tips for following this plan?  Reading food labels  · The Nutrition Facts label lists the amount of sodium in one serving of the food. If you eat more than one serving, you must multiply the listed amount of sodium by the number of servings.  · Choose foods with less than 140 mg of sodium per serving.  · Avoid foods with 300 mg of sodium or more per serving.  Shopping    · Look for lower-sodium products, often labeled as \"low-sodium\" or \"no salt added.\"  · Always check the sodium content, even if foods are labeled as \"unsalted\" or \"no salt added.\"  · Buy fresh foods.  ? Avoid canned foods and pre-made or frozen meals.  ? Avoid canned, cured, or processed meats.  · Buy breads that have less than 80 mg of sodium per slice.    Cooking    · Eat more home-cooked food and less restaurant, buffet, and fast food.  · Avoid adding salt when cooking. Use salt-free seasonings or herbs instead of table salt or sea salt. Check with your health care provider or pharmacist before using salt substitutes.  · Cook with plant-based oils, such as canola, sunflower, or olive oil.    Meal planning  · When eating at a restaurant, ask that your food be prepared with less salt or no salt, if possible. Avoid dishes labeled as brined, pickled, cured, smoked, or made with soy sauce, miso, or teriyaki sauce.  · Avoid foods that contain MSG (monosodium glutamate). MSG is sometimes added to Chinese food, bouillon, and some " "canned foods.  · Make meals that can be grilled, baked, poached, roasted, or steamed. These are generally made with less sodium.  General information  Most people on this plan should limit their sodium intake to 1,500-2,000 mg (milligrams) of sodium each day.  What foods should I eat?  Fruits  Fresh, frozen, or canned fruit. Fruit juice.  Vegetables  Fresh or frozen vegetables. \"No salt added\" canned vegetables. \"No salt added\" tomato sauce and paste. Low-sodium or reduced-sodium tomato and vegetable juice.  Grains  Low-sodium cereals, including oats, puffed wheat and rice, and shredded wheat. Low-sodium crackers. Unsalted rice. Unsalted pasta. Low-sodium bread. Whole-grain breads and whole-grain pasta.  Meats and other proteins  Fresh or frozen (no salt added) meat, poultry, seafood, and fish. Low-sodium canned tuna and salmon. Unsalted nuts. Dried peas, beans, and lentils without added salt. Unsalted canned beans. Eggs. Unsalted nut butters.  Dairy  Milk. Soy milk. Cheese that is naturally low in sodium, such as ricotta cheese, fresh mozzarella, or Swiss cheese. Low-sodium or reduced-sodium cheese. Cream cheese. Yogurt.  Seasonings and condiments  Fresh and dried herbs and spices. Salt-free seasonings. Low-sodium mustard and ketchup. Sodium-free salad dressing. Sodium-free light mayonnaise. Fresh or refrigerated horseradish. Lemon juice. Vinegar.  Other foods  Homemade, reduced-sodium, or low-sodium soups. Unsalted popcorn and pretzels. Low-salt or salt-free chips.  The items listed above may not be a complete list of foods and beverages you can eat. Contact a dietitian for more information.  What foods should I avoid?  Vegetables  Sauerkraut, pickled vegetables, and relishes. Olives. French fries. Onion rings. Regular canned vegetables (not low-sodium or reduced-sodium). Regular canned tomato sauce and paste (not low-sodium or reduced-sodium). Regular tomato and vegetable juice (not low-sodium or reduced-sodium). " Frozen vegetables in sauces.  Grains  Instant hot cereals. Bread stuffing, pancake, and biscuit mixes. Croutons. Seasoned rice or pasta mixes. Noodle soup cups. Boxed or frozen macaroni and cheese. Regular salted crackers. Self-rising flour.  Meats and other proteins  Meat or fish that is salted, canned, smoked, spiced, or pickled. Precooked or cured meat, such as sausages or meat loaves. East. Ham. Pepperoni. Hot dogs. Corned beef. Chipped beef. Salt pork. Jerky. Pickled herring. Anchovies and sardines. Regular canned tuna. Salted nuts.  Dairy  Processed cheese and cheese spreads. Hard cheeses. Cheese curds. Blue cheese. Feta cheese. String cheese. Regular cottage cheese. Buttermilk. Canned milk.  Fats and oils  Salted butter. Regular margarine. Ghee. East fat.  Seasonings and condiments  Onion salt, garlic salt, seasoned salt, table salt, and sea salt. Canned and packaged gravies. Worcestershire sauce. Tartar sauce. Barbecue sauce. Teriyaki sauce. Soy sauce, including reduced-sodium. Steak sauce. Fish sauce. Oyster sauce. Cocktail sauce. Horseradish that you find on the shelf. Regular ketchup and mustard. Meat flavorings and tenderizers. Bouillon cubes. Hot sauce. Pre-made or packaged marinades. Pre-made or packaged taco seasonings. Relishes. Regular salad dressings. Salsa.  Other foods  Salted popcorn and pretzels. Corn chips and puffs. Potato and tortilla chips. Canned or dried soups. Pizza. Frozen entrees and pot pies.  The items listed above may not be a complete list of foods and beverages you should avoid. Contact a dietitian for more information.  Summary  · Eating less sodium can help lower your blood pressure, reduce swelling, and protect your heart, liver, and kidneys.  · Most people on this plan should limit their sodium intake to 1,500-2,000 mg (milligrams) of sodium each day.  · Canned, boxed, and frozen foods are high in sodium. Restaurant foods, fast foods, and pizza are also very high in sodium.  You also get sodium by adding salt to food.  · Try to cook at home, eat more fresh fruits and vegetables, and eat less fast food and canned, processed, or prepared foods.  This information is not intended to replace advice given to you by your health care provider. Make sure you discuss any questions you have with your health care provider.  Document Revised: 01/22/2021 Document Reviewed: 11/18/2020  [employer] Patient Education © 2021 [employer]

## 2021-11-01 NOTE — OUTREACH NOTE
Medical Week 1 Survey      Responses   [facility] patient discharged from? [facility]   Does the patient have one of the following disease processes/diagnoses(primary or secondary)? Other   Week 1 attempt successful? Yes   Call start time 0909   Call end time 0915   Discharge diagnosis Pacemaker placement    Person spoke with today (if not patient) and relationship    Meds reviewed with patient/caregiver? Yes   Is the patient having any side effects they believe may be caused by any medication additions or changes? No   Does the patient have all medications ordered at discharge? Yes   Is the patient taking all medications as directed (includes completed medication regime)? Yes   Comments regarding appointments Cardio appt 11/2/21   Does the patient have a primary care provider?  Yes   Does the patient have an appointment with their PCP within 7 days of discharge? No   Comments regarding PCP Will call for appt   What is preventing the patient from scheduling follow up appointments within 7 days of discharge? --  [Was not aware of why Pt needed to FU with PCP. Education provided. ]   Nursing Interventions Educated patient on importance of making appointment,  Advised patient to make appointment   Has home health visited the patient within 72 hours of discharge? N/A   Psychosocial issues? No   Did the patient receive a copy of their discharge instructions? Yes   Nursing interventions Reviewed instructions with patient   What is the patient's perception of their health status since discharge? Improving   Is the patient/caregiver able to teach back signs and symptoms related to disease process for when to call PCP? Yes   Is the patient/caregiver able to teach back signs and symptoms related to disease process for when to call 911? Yes   Is the patient/caregiver able to teach back the hierarchy of who to call/visit for symptoms/problems? PCP, Specialist, Home health nurse, Urgent Care, ED, 911 Yes   If the  patient is a current smoker, are they able to teach back resources for cessation? Smoking cessation medications  [Has not vaped for 2- 3 weeks. ]   Week 1 call completed? Yes   Wrap up additional comments  states Pt is doing ok . Pt did have N/V when she first got home. No vomiting since. He states she does have nausea at times. Encouraged food with Pain meds. Pt has an appt with cardio tomorrow.           Sumi Leach RN

## 2021-11-02 ENCOUNTER — CLINICAL SUPPORT NO REQUIREMENTS (OUTPATIENT)
Dept: CARDIOLOGY | Facility: CLINIC | Age: 66
End: 2021-11-02

## 2021-11-02 ENCOUNTER — OFFICE VISIT (OUTPATIENT)
Dept: CARDIOLOGY | Facility: CLINIC | Age: 66
End: 2021-11-02

## 2021-11-02 VITALS
HEIGHT: 63 IN | WEIGHT: 200 LBS | BODY MASS INDEX: 35.44 KG/M2 | SYSTOLIC BLOOD PRESSURE: 144 MMHG | HEART RATE: 70 BPM | DIASTOLIC BLOOD PRESSURE: 76 MMHG

## 2021-11-02 DIAGNOSIS — Z95.0 PRESENCE OF CARDIAC PACEMAKER: Primary | ICD-10-CM

## 2021-11-02 DIAGNOSIS — I50.22 CHRONIC HFREF (HEART FAILURE WITH REDUCED EJECTION FRACTION) (HCC): ICD-10-CM

## 2021-11-02 DIAGNOSIS — I10 ESSENTIAL HYPERTENSION: ICD-10-CM

## 2021-11-02 PROCEDURE — 93280 PM DEVICE PROGR EVAL DUAL: CPT | Performed by: INTERNAL MEDICINE

## 2021-11-02 PROCEDURE — 99024 POSTOP FOLLOW-UP VISIT: CPT | Performed by: NURSE PRACTITIONER

## 2021-11-02 RX ORDER — LISINOPRIL 5 MG/1
5 TABLET ORAL
Qty: 90 TABLET | Refills: 1 | Status: SHIPPED | OUTPATIENT
Start: 2021-11-02 | End: 2022-05-11

## 2021-11-02 NOTE — PROGRESS NOTES
"Chief Complaint  Congestive Heart Failure and Hypertension    Subjective            History of Present Illness  Jana I Schoenbaechler is a 65-year-old white/ female patient who presents to the office today for follow-up after pacemaker implantation by Dr. Fiore on 10/26/2021.  She had to have pacemaker implanted due to symptomatic complete heart block.  She has hypertension and chronic HFrEF as well.  Her echocardiogram on 10/25/2021 showed EF of 45% and mildly decreased left ventricular systolic function.  She was discharged from the hospital on 10/27/2021 with new prescription for lisinopril 5 mg daily.  Today she reports that she is \"feeling much better\", still has some soreness to the surgical site.  She reports compliance with all of her medications.  She denies any chest pain, shortness of breath, lightheadedness/dizziness, palpitations, or edema.    PMH  Past Medical History:   Diagnosis Date   • Arthritis    • CHB s/p cardiac pacemaker 10/25/2021   • Chronic HFrEF (heart failure with reduced ejection fraction)  10/25/2021    10/25/21 echo: Estimated right ventricular systolic pressure from tricuspid regurgitation is markedly elevated (>55 mmHg). Mild LVH. Estimated left ventricular EF = 45% Left ventricular systolic function is mildly decreased.     • GERD (gastroesophageal reflux disease)    • Glaucoma     LEFT EYE   • Hypertension    • Osteoporosis          ALLERGY  No Known Allergies       SURGICALHX  Past Surgical History:   Procedure Laterality Date   • CARDIAC CATHETERIZATION N/A 10/26/2021    Procedure: Left Heart Cath;  Surgeon: Calixto Fiore MD;  Location: [facility] CATH INVASIVE LOCATION;  Service: Cardiovascular;  Laterality: N/A;   • CARDIAC ELECTROPHYSIOLOGY PROCEDURE N/A 10/26/2021    Procedure: Pacemaker SC new;  Surgeon: Calixto Fiore MD;  Location: [facility] CATH INVASIVE LOCATION;  Service: Cardiovascular;  Laterality: N/A;   • CHOLECYSTECTOMY     • INSERT / REPLACE / REMOVE PACEMAKER   "   • KNEE SURGERY Right           SOC  Social History     Socioeconomic History   • Marital status:    Tobacco Use   • Smoking status: Former Smoker     Quit date: 10/25/2017     Years since quittin.0   • Smokeless tobacco: Never Used   Vaping Use   • Vaping Use: Former   Substance and Sexual Activity   • Alcohol use: Not Currently   • Drug use: Never   • Sexual activity: Not Currently         FAMHX  History reviewed. No pertinent family history.     Current outpatient and discharge medications have been reconciled for the patient.  Reviewed by: KEILA Barba  Current Outpatient Medications on File Prior to Visit   Medication Sig   • acetaminophen (TYLENOL) 325 MG tablet Take 2 tablets by mouth Every 4 (Four) Hours As Needed for Mild Pain  or Moderate Pain  for up to 100 doses.   • alendronate (FOSAMAX) 70 MG tablet Take 1 tablet by mouth Every 7 (Seven) Days. Pt takes on    • brimonidine (ALPHAGAN) 0.2 % ophthalmic solution Administer 1 drop to both eyes 2 (two) times a day.   • calcium carbonate (TUMS) 500 MG chewable tablet Chew 1 tablet Daily.   • cholecalciferol (VITAMIN D3) 25 MCG (1000 UT) tablet Take 1.25 Units by mouth Take As Directed. Every 5 days   • dorzolamide-timolol (COSOPT) 22.3-6.8 MG/ML ophthalmic solution Administer 1 drop to both eyes 2 (Two) Times a Day.   • ergocalciferol (ERGOCALCIFEROL) 1.25 MG (75249 UT) capsule Take 50,000 Units by mouth Take As Directed. Pt takes every 5 days   • HYDROcodone-acetaminophen (NORCO) 5-325 MG per tablet Take 1 tablet by mouth Every 6 (Six) Hours As Needed for Moderate Pain  or Severe Pain  for up to 12 doses.   • hyoscyamine (ANASPAZ,LEVSIN) 0.125 MG tablet Take 0.125 mg by mouth Every 4 (Four) Hours As Needed for Cramping.   • latanoprost (XALATAN) 0.005 % ophthalmic solution Administer 1 drop to both eyes Every Night.   • lisinopril (PRINIVIL,ZESTRIL) 5 MG tablet Take 1 tablet by mouth Daily.   • Netarsudil  "Dimesylate (Rhopressa) 0.02 % solution Apply 1 drop to eye(s) as directed by provider Daily.   • pantoprazole (PROTONIX) 40 MG EC tablet Take 40 mg by mouth Daily.     No current facility-administered medications on file prior to visit.         Objective   /76   Pulse 70   Ht 160 cm (63\")   Wt 90.7 kg (200 lb)   BMI 35.43 kg/m²       Physical Exam  Constitutional:       Appearance: She is obese.   HENT:      Head: Normocephalic.   Neck:      Vascular: No carotid bruit.   Cardiovascular:      Rate and Rhythm: Normal rate and regular rhythm.      Pulses: Normal pulses.      Heart sounds: Normal heart sounds. No murmur heard.      Pulmonary:      Effort: Pulmonary effort is normal.      Breath sounds: Normal breath sounds.   Musculoskeletal:      Cervical back: Neck supple.      Right lower leg: No edema.      Left lower leg: No edema.   Skin:     General: Skin is dry.      Capillary Refill: Capillary refill takes less than 2 seconds.   Neurological:      Mental Status: She is alert and oriented to person, place, and time.   Psychiatric:         Behavior: Behavior normal.       Result Review :   The following data was reviewed by: KEILA Nicholas on 11/02/2021:  proBNP   Date Value Ref Range Status   10/25/2021 2,605.0 (H) 0.0 - 900.0 pg/mL Final     CMP    CMP 10/27/21   Glucose 113 (A)   BUN 21   Creatinine 1.06 (A)   eGFR Non African Am 52 (A)   Sodium 135 (A)   Potassium 5.0   Chloride 103   Calcium 8.8   Albumin    Total Bilirubin    Alkaline Phosphatase    AST (SGOT)    ALT (SGPT)    (A) Abnormal value       Comments are available for some flowsheets but are not being displayed.           CBC w/diff    CBC w/Diff 10/27/21   WBC 5.65   RBC 4.80   Hemoglobin 14.0   Hematocrit 42.1   MCV 87.7   MCH 29.2   MCHC 33.3   RDW 12.8   Platelets 276   Neutrophil Rel % 67.2   Immature Granulocyte Rel % 0.2   Lymphocyte Rel % 17.9 (A)   Monocyte Rel % 12.4 (A)   Eosinophil Rel % 1.6   Basophil Rel % 0.7   (A) " "Abnormal value             Lab Results   Component Value Date    TSH 1.190 11/25/2019      No results found for: FREET4   No results found for: DDIMERQUANT  Magnesium   Date Value Ref Range Status   10/26/2021 1.9 1.6 - 2.4 mg/dL Final      No results found for: DIGOXIN   Lab Results   Component Value Date    TROPONINT 0.013 10/26/2021               Results for orders placed during the hospital encounter of 10/25/21    Adult Transthoracic Echo Complete W/ Cont if Necessary Per Protocol    Interpretation Summary  · The mitral valve area (PHT) is 2.7 cm2.  · Estimated right ventricular systolic pressure from tricuspid regurgitation is markedly elevated (>55 mmHg).  · Left ventricular wall thickness is consistent with mild to moderate concentric hypertrophy.  · Estimated left ventricular EF = 45% Left ventricular systolic function is mildly decreased.       Assessment and Plan    Diagnoses and all orders for this visit:    1. CHB s/p cardiac pacemaker (Primary)  Patient had device interrogated while in the office today. \"Normal Dual Chamber Pacemaker device interrogation.  Normal evaluation of device function and lead measurements.  No optimization was needed of parameters or maximization of device longevity.  Patient is on automated Home Remote Monitoring.  Her Bandage was removed along with steri strips, the incision is intact and all corners are closed.  There is no sign of infection and very minimal swelling.\"  Lots of time spent with patient educating on pacemaker, remote monitoring, and postoperative care.  All questions answered at this time.    2. Essential hypertension  Slightly elevated in office today. Check blood pressure twice a day for the next two weeks, blood pressure log provided for patient.  Will review log once available to me and will make any necessary medication adjustments at that time.  For now continue lisinopril 5 mg daily.    3. Chronic HFrEF (heart failure with reduced ejection fraction) "   Symptomatically stable at this time and euvolemic on exam today.  Continue lisinopril 5 mg daily and repeat echocardiogram in 6 months to evaluate left ventricular systolic function.  -     Adult Transthoracic Echo Complete W/ Cont if Necessary Per Protocol; Future    Other orders  -     lisinopril (PRINIVIL,ZESTRIL) 5 MG tablet; Take 1 tablet by mouth Daily.  Dispense: 90 tablet; Refill: 1             Follow Up {Instructions Charge Capture  Follow-up Communications :23}  Return in about 6 months (around 5/2/2022) for Follow up with Dr Fiore.    Patient was given instructions and counseling regarding her condition or for health maintenance advice. Please see specific information pulled into the AVS if appropriate.     Emma I Schoenbaechler  reports that she quit smoking about 4 years ago. She has never used smokeless tobacco..         Candace Hillman, APRN  11/02/21  10:36 EDT    Dictated Utilizing Dragon Dictation

## 2021-11-02 NOTE — PROGRESS NOTES
Normal Dual Chamber Pacemaker device interrogation.  Normal evaluation of device function and lead measurements.  No optimization was needed of parameters or maximization of device longevity.  Patient is on automated Home Remote Monitoring.  Her Bandage was removed along with steri strips, the incision is intact and all corners are closed.  There is no sign of infection and very minimal swelling.

## 2021-11-09 ENCOUNTER — OFFICE VISIT (OUTPATIENT)
Dept: PULMONOLOGY | Facility: CLINIC | Age: 66
End: 2021-11-09

## 2021-11-09 ENCOUNTER — READMISSION MANAGEMENT (OUTPATIENT)
Dept: CALL CENTER | Facility: HOSPITAL | Age: 66
End: 2021-11-09

## 2021-11-09 VITALS
DIASTOLIC BLOOD PRESSURE: 71 MMHG | SYSTOLIC BLOOD PRESSURE: 147 MMHG | TEMPERATURE: 98.4 F | HEART RATE: 65 BPM | WEIGHT: 200 LBS | BODY MASS INDEX: 35.44 KG/M2 | HEIGHT: 63 IN | OXYGEN SATURATION: 100 %

## 2021-11-09 DIAGNOSIS — Z87.891 EX-SMOKER: ICD-10-CM

## 2021-11-09 DIAGNOSIS — IMO0001 LUNG NODULE < 6CM ON CT: Primary | ICD-10-CM

## 2021-11-09 DIAGNOSIS — G47.33 OSA (OBSTRUCTIVE SLEEP APNEA): ICD-10-CM

## 2021-11-09 DIAGNOSIS — E66.09 CLASS 2 OBESITY DUE TO EXCESS CALORIES WITH BODY MASS INDEX (BMI) OF 35.0 TO 35.9 IN ADULT, UNSPECIFIED WHETHER SERIOUS COMORBIDITY PRESENT: ICD-10-CM

## 2021-11-09 DIAGNOSIS — T78.40XA ALLERGY, INITIAL ENCOUNTER: ICD-10-CM

## 2021-11-09 DIAGNOSIS — R06.09 DYSPNEA ON EXERTION: ICD-10-CM

## 2021-11-09 PROCEDURE — 99203 OFFICE O/P NEW LOW 30 MIN: CPT | Performed by: SPECIALIST

## 2021-11-09 NOTE — PATIENT INSTRUCTIONS
Allergies, Adult  An allergy means that your body reacts to something that bothers it (allergen). This can happen from something that you eat, breathe in, or touch.  Allergies often affect the nose, eyes, skin, and stomach. They can be mild, moderate, or very bad (severe). An allergy cannot spread from person to person. They can happen at any age. Sometimes, people outgrow them.  What are the causes?  · Outdoor things, such as pollen, car fumes, and mold.  · Indoor things, such as dust, smoke, mold, and pets.  · Foods.  · Medicines.  · Things that bother your skin, such as perfume and bug bites.  What increases the risk?  · Having family members with allergies or asthma.  What are the signs or symptoms?  Symptoms depend on how bad your allergy is.  Mild to moderate symptoms  · Runny nose, stuffy nose, or sneezing.  · Itchy mouth, ears, or throat.  · A feeling of mucus dripping down the back of your throat.  · Sore throat.  · Eyes that are itchy, red, watery, or puffy.  · A skin rash, or red, swollen areas of skin (hives).  · Stomach cramps or bloating.  Severe symptoms  Very bad allergies to food, medicine, or bug bites may cause a very bad allergy reaction (anaphylaxis). This can be life-threatening. Symptoms include:  · A red face.  · Wheezing or coughing.  · Swollen lips, tongue, or mouth.  · Tight or swollen throat.  · Chest pain or tightness, or a fast heartbeat.  · Trouble breathing or shortness of breath.  · Pain in your belly (abdomen), vomiting, or watery poop (diarrhea).  · Feeling dizzy or fainting.  How is this treated?         Treatment for this condition depends on your symptoms. Treatment may include:  · Cold, wet cloths for itching and swelling.  · Eye drops, nose sprays, or skin creams.  · Washing out your nose each day.  · A humidifier.  · Medicines.  · A change to the foods you eat.  · Being exposed again and again to tiny amounts of allergens. This helps your body get used to them. You might  have:  ? Allergy shots.  ? Very small amounts of allergen put under your tongue.  · An emergency shot (auto-injector pen) if you have a very bad allergy reaction.  ? This is a medicine with a needle. You can put it into your skin by yourself.  ? Your doctor will teach you how to use it.  Follow these instructions at home:  Medicines    · Take or apply over-the-counter and prescription medicines only as told by your doctor.  · If you are at risk for a very bad allergy reaction, keep an auto-injector pen with you all the time.    Eating and drinking  · Follow instructions from your doctor about what to eat and drink.  · Drink enough fluid to keep your pee (urine) pale yellow.  General instructions  · If you have ever had a very bad allergy reaction, wear a medical alert bracelet or necklace.  · Stay away from things that you are allergic to.  · Keep all follow-up visits as told by your doctor. This is important.  Contact a doctor if:  · Your symptoms do not get better with treatment.  Get help right away if:  · You have symptoms of a very bad allergy reaction. These include:  ? A swollen mouth, tongue, or throat.  ? Pain or tightness in your chest.  ? Trouble breathing.  ? Being short of breath.  ? Dizziness.  ? Fainting.  ? Very bad pain in your belly.  ? Vomiting.  ? Watery poop.  These symptoms may be an emergency. Do not wait to see if the symptoms will go away. Get medical help right away. Call your local emergency services (911 in the [place] Do not drive yourself to the hospital.  Summary  · Take or apply over-the-counter and prescription medicines only as told by your doctor.  · Stay away from things you are allergic to.  · If you are at risk for a very bad allergy reaction, carry an auto-injector pen all the time.  · Wear a medical alert bracelet or necklace.  · Very bad allergy reactions can be life-threatening. Get help right away.  This information is not intended to replace advice given to you by your  health care provider. Make sure you discuss any questions you have with your health care provider.  Document Revised: 10/28/2020 Document Reviewed: 10/28/2020  [employer] Patient Education © 2021 [employer]    Pulmonary Nodule  A pulmonary nodule is tissue that has grown on your lung. A nodule may be cancer, but most nodules are not cancer.  Follow these instructions at home:    · Take over-the-counter and prescription medicines only as told by your doctor.  · Do not use any products that have nicotine or tobacco, such as cigarettes and e-cigarettes. If you need help quitting, ask your doctor.  · Keep all follow-up visits as told by your doctor. This is important.  Contact a doctor if:  · You have trouble breathing when doing activities.  · You feel sick.  · You feel more tired than normal.  · You do not feel like eating.  · You lose weight without trying.  · You have chills.  · You have night sweats.  Get help right away if:  · You cannot catch your breath.  · You start making whistling sounds when breathing (wheezing).  · You cannot stop coughing.  · You cough up blood.  · You get dizzy.  · You feel like you are going to pass out (faint).  · You have sudden chest pain.  · You have a fever or symptoms for more than 2-3 days.  · You have a fever and your symptoms suddenly get worse.  Summary  · A pulmonary nodule is tissue that has grown on your lung.  · Most nodules are not cancer.  · Your doctor will do tests to know what kind of nodule you have, and whether you need treatment for it.  This information is not intended to replace advice given to you by your health care provider. Make sure you discuss any questions you have with your health care provider.  Document Revised: 01/11/2019 Document Reviewed: 01/16/2018  [employer] Patient Education © 2021 [employer]    Preventing Exposure to Secondhand Smoke, Adult  Secondhand smoke is smoke that comes from burning tobacco. It includes smoke from cigarettes, pipes, or  cigars. Being exposed to secondhand smoke is as dangerous as smoking.  Common places you may be exposed to secondhand smoke include:  · Work.  · Public places, like restaurants, shopping centers, and bobo.  · Home, especially if you live in an apartment building.  How can secondhand smoke affect me?  There is no safe level of secondhand smoke. Smoke from cigarettes contains thousands of chemicals, including chemicals that are known to cause cancer. Secondhand smoke can cause many health problems, such as:  · Cancer.  · Heart disease.  · Stroke.  · Pregnancy problems, such as pregnancy loss (miscarriage), low birth weight, and early birth (premature).  What actions can I take to prevent exposure to secondhand smoke?    · Do not smoke.  · Keep your home smoke-free.  · Do not allow smoking in your car.  · Avoid public places where smoking is allowed.  · Talk to your employer about your company's policies on smoking.  ? Your workplace should have a policy  smoking areas from nonsmoking areas.  ? Smoking areas should have a system for ventilating and cleaning the air.  Where to find more information  Centers for Disease Control and Prevention (CDC): https://www.cdc.gov/tobacco/  American Cancer Society: https://www.cancer.org  American Heart Association: https://www.heart.org  Summary  · Secondhand smoke is smoke that comes from burning tobacco. Secondhand smoke exposes you to the dangers of smoking, even if you are not the one smoking.  · There is no safe level of secondhand smoke. Several chemicals in secondhand smoke are known to cause cancer. Secondhand smoke can also cause many other health problems.  · To prevent exposure to secondhand smoke, do not smoke, discourage others from smoking, keep your home and car smoke-free, and avoid places where smoking is allowed.  This information is not intended to replace advice given to you by your health care provider. Make sure you discuss any questions you have  with your health care provider.  Document Revised: 09/09/2020 Document Reviewed: 01/24/2019  [employer] Patient Education © 2021 [employer]    Tobacco Use Disorder  Tobacco use disorder (TUD) occurs when a person craves, seeks, and uses tobacco, regardless of the consequences. This disorder can cause problems with mental and physical health. It can affect your ability to have healthy relationships, and it can keep you from meeting your responsibilities at work, home, or school.  Tobacco may be:  · Smoked as a cigarette or cigar.  · Inhaled using e-cigarettes.  · Smoked in a pipe or hookah.  · Chewed as smokeless tobacco.  · Inhaled into the nostrils as snuff.  Tobacco products contain a dangerous chemical called nicotine, which is very addictive. Nicotine triggers hormones that make the body feel stimulated and works on areas of the brain that make you feel good. These effects can make it hard for people to quit nicotine.  Tobacco contains many other unsafe chemicals that can damage almost every organ in the body. Smoking tobacco also puts others in danger due to fire risk and possible health problems caused by breathing in secondhand smoke.  What are the signs or symptoms?  Symptoms of TUD may include:  · Being unable to slow down or stop your tobacco use.  · Spending an abnormal amount of time getting or using tobacco.  · Craving tobacco. Cravings may last for up to 6 months after quitting.  · Tobacco use that:  ? Interferes with your work, school, or home life.  ? Interferes with your personal and social relationships.  ? Makes you give up activities that you once enjoyed or found important.  · Using tobacco even though you know that it is:  ? Dangerous or bad for your health or someone else's health.  ? Causing problems in your life.  · Needing more and more of the substance to get the same effect (developing tolerance).  · Experiencing unpleasant symptoms if you do not use the substance (withdrawal).  Withdrawal symptoms may include:  ? Depressed, anxious, or irritable mood.  ? Difficulty concentrating.  ? Increased appetite.  ? Restlessness or trouble sleeping.  · Using the substance to avoid withdrawal.  How is this diagnosed?  This condition may be diagnosed based on:  · Your current and past tobacco use. Your health care provider may ask questions about how your tobacco use affects your life.  · A physical exam.  You may be diagnosed with TUD if you have at least two symptoms within a 12-month period.  How is this treated?  This condition is treated by stopping tobacco use. Many people are unable to quit on their own and need help. Treatment may include:  · Nicotine replacement therapy (NRT). NRT provides nicotine without the other harmful chemicals in tobacco. NRT gradually lowers the dosage of nicotine in the body and reduces withdrawal symptoms. NRT is available as:  ? Over-the-counter gums, lozenges, and skin patches.  ? Prescription mouth inhalers and nasal sprays.  · Medicine that acts on the brain to reduce cravings and withdrawal symptoms.  · A type of talk therapy that examines your triggers for tobacco use, how to avoid them, and how to cope with cravings (behavioral therapy).  · Hypnosis. This may help with withdrawal symptoms.  · Joining a support group for others coping with TUD.  The best treatment for TUD is usually a combination of medicine, talk therapy, and support groups. Recovery can be a long process. Many people start using tobacco again after stopping (relapse). If you relapse, it does not mean that treatment will not work.  Follow these instructions at home:    Lifestyle  · Do not use any products that contain nicotine or tobacco, such as cigarettes and e-cigarettes.  · Avoid things that trigger tobacco use as much as you can. Triggers include people and situations that usually cause you to use tobacco.  · Avoid drinks that contain caffeine, including coffee. These may worsen some  withdrawal symptoms.  · Find ways to manage stress. Wanting to smoke may cause stress, and stress can make you want to smoke. Relaxation techniques such as deep breathing, meditation, and yoga may help.  · Attend support groups as needed. These groups are an important part of long-term recovery for many people.  General instructions  · Take over-the-counter and prescription medicines only as told by your health care provider.  · Check with your health care provider before taking any new prescription or over-the-counter medicines.  · Decide on a friend, family member, or smoking quit-line (such as 1-800-QUIT-NOW in the [place] that you can call or text when you feel the urge to smoke or when you need help coping with cravings.  · Keep all follow-up visits as told by your health care provider and therapist. This is important.  Contact a health care provider if:  · You are not able to take your medicines as prescribed.  · Your symptoms get worse, even with treatment.  Summary  · Tobacco use disorder (TUD) occurs when a person craves, seeks, and uses tobacco regardless of the consequences.  · This condition may be diagnosed based on your current and past tobacco use and a physical exam.  · Many people are unable to quit on their own and need help. Recovery can be a long process.  · The most effective treatment for TUD is usually a combination of medicine, talk therapy, and support groups.  This information is not intended to replace advice given to you by your health care provider. Make sure you discuss any questions you have with your health care provider.  Document Revised: 12/05/2018 Document Reviewed: 12/05/2018  [employer] Patient Education © 2021 [employer]    Obesity, Adult  Obesity is having too much body fat. Being obese means that your weight is more than what is healthy for you.  BMI is a number that explains how much body fat you have. If you have a BMI of 30 or more, you are obese. Obesity is often caused by  eating or drinking more calories than your body uses. Changing your lifestyle can help you lose weight.  Obesity can cause serious health problems, such as:  · Stroke.  · Coronary artery disease (CAD).  · Type 2 diabetes.  · Some types of cancer, including cancers of the colon, breast, uterus, and gallbladder.  · Osteoarthritis.  · High blood pressure (hypertension).  · High cholesterol.  · Sleep apnea.  · Gallbladder stones.  · Infertility problems.  What are the causes?  · Eating meals each day that are high in calories, sugar, and fat.  · Being born with genes that may make you more likely to become obese.  · Having a medical condition that causes obesity.  · Taking certain medicines.  · Sitting a lot (having a sedentary lifestyle).  · Not getting enough sleep.  · Drinking a lot of drinks that have sugar in them.  What increases the risk?  · Having a family history of obesity.  · Being an  woman.  · Being a  man.  · Living in an area with limited access to:  ? [place], recreation centers, or sidewalks.  ? Healthy food choices, such as grocery stores and farmers' markets.  What are the signs or symptoms?  The main sign is having too much body fat.  How is this treated?  · Treatment for this condition often includes changing your lifestyle. Treatment may include:  ? Changing your diet. This may include making a healthy meal plan.  ? Exercise. This may include activity that causes your heart to beat faster (aerobic exercise) and strength training. Work with your doctor to design a program that works for you.  ? Medicine to help you lose weight. This may be used if you are not able to lose 1 pound a week after 6 weeks of healthy eating and more exercise.  ? Treating conditions that cause the obesity.  ? Surgery. Options may include gastric banding and gastric bypass. This may be done if:  § Other treatments have not helped to improve your condition.  § You have a BMI of 40 or higher.  § You have  life-threatening health problems related to obesity.  Follow these instructions at home:  Eating and drinking    · Follow advice from your doctor about what to eat and drink. Your doctor may tell you to:  ? Limit fast food, sweets, and processed snack foods.  ? Choose low-fat options. For example, choose low-fat milk instead of whole milk.  ? Eat 5 or more servings of fruits or vegetables each day.  ? Eat at home more often. This gives you more control over what you eat.  ? Choose healthy foods when you eat out.  ? Learn to read food labels. This will help you learn how much food is in 1 serving.  ? Keep low-fat snacks available.  ? Avoid drinks that have a lot of sugar in them. These include soda, fruit juice, iced tea with sugar, and flavored milk.  · Drink enough water to keep your pee (urine) pale yellow.  · Do not go on fad diets.    Physical activity  · Exercise often, as told by your doctor. Most adults should get up to 150 minutes of moderate-intensity exercise every week.Ask your doctor:  ? What types of exercise are safe for you.  ? How often you should exercise.  · Warm up and stretch before being active.  · Do slow stretching after being active (cool down).  · Rest between times of being active.  Lifestyle  · Work with your doctor and a food expert (dietitian) to set a weight-loss goal that is best for you.  · Limit your screen time.  · Find ways to reward yourself that do not involve food.  · Do not drink alcohol if:  ? Your doctor tells you not to drink.  ? You are pregnant, may be pregnant, or are planning to become pregnant.  · If you drink alcohol:  ? Limit how much you use to:  § 0-1 drink a day for women.  § 0-2 drinks a day for men.  ? Be aware of how much alcohol is in your drink. In the [place] one drink equals one 12 oz bottle of beer (355 mL), one 5 oz glass of wine (148 mL), or one 1½ oz glass of hard liquor (44 mL).  General instructions  · Keep a weight-loss journal. This can help you keep  track of:  ? The food that you eat.  ? How much exercise you get.  · Take over-the-counter and prescription medicines only as told by your doctor.  · Take vitamins and supplements only as told by your doctor.  · Think about joining a support group.  · Keep all follow-up visits as told by your doctor. This is important.  Contact a doctor if:  · You cannot meet your weight loss goal after you have changed your diet and lifestyle for 6 weeks.  Get help right away if you:  · Are having trouble breathing.  · Are having thoughts of harming yourself.  Summary  · Obesity is having too much body fat.  · Being obese means that your weight is more than what is healthy for you.  · Work with your doctor to set a weight-loss goal.  · Get regular exercise as told by your doctor.  This information is not intended to replace advice given to you by your health care provider. Make sure you discuss any questions you have with your health care provider.  Document Revised: 08/22/2019 Document Reviewed: 08/22/2019  [employer] Patient Education © 2021 [employer]    Screening for Sleep Apnea    Sleep apnea is a condition in which breathing pauses or becomes shallow during sleep. Sleep apnea screening is a test to determine if you are at risk for sleep apnea. The test is easy and only takes a few minutes. Your health care provider may ask you to have this test in preparation for surgery or as part of a physical exam.  What are the symptoms of sleep apnea?  Common symptoms of sleep apnea include:  · Snoring.  · Restless sleep.  · Daytime sleepiness.  · Pauses in breathing.  · Choking during sleep.  · Irritability.  · Forgetfulness.  · Trouble thinking clearly.  · Depression.  · Personality changes.  Most people with sleep apnea are not aware that they have it.  Why should I get screened?  Getting screened for sleep apnea can help:  · Ensure your safety. It is important for your health care providers to know whether or not you have sleep  apnea, especially if you are having surgery or have other long-term (chronic) health conditions.  · Improve your health and allow you to get a better night's rest. Restful sleep can help you:  ? Have more energy.  ? Lose weight.  ? Improve high blood pressure.  ? Improve diabetes management.  ? Prevent stroke.  ? Prevent car accidents.  How is screening done?  Screening usually includes being asked a list of questions about your sleep quality. Some questions you may be asked include:  · Do you snore?  · Is your sleep restless?  · Do you have daytime sleepiness?  · Has a partner or spouse told you that you stop breathing during sleep?  · Have you had trouble concentrating or memory loss?  If your screening test is positive, you are at risk for the condition. Further testing may be needed to confirm a diagnosis of sleep apnea.  Where to find more information  You can find screening tools online or at your health care clinic. For more information about sleep apnea screening and healthy sleep, visit these websites:  · Centers for Disease Control and Prevention: www.cdc.gov/sleep/index.html  · American Sleep Apnea Association: www.sleepapnea.org  Contact a health care provider if:  · You think that you may have sleep apnea.  Summary  · Sleep apnea screening can help determine if you are at risk for sleep apnea.  · It is important for your health care providers to know whether or not you have sleep apnea, especially if you are having surgery or have other chronic health conditions.  · You may be asked to take a screening test for sleep apnea in preparation for surgery or as part of a physical exam.  This information is not intended to replace advice given to you by your health care provider. Make sure you discuss any questions you have with your health care provider.  Document Revised: 10/04/2019 Document Reviewed: 03/30/2018  [employer] Patient Education © 2021 [employer]    Sleep Apnea  Sleep apnea affects breathing  during sleep. It causes breathing to stop for a short time or to become shallow. It can also increase the risk of:  · Heart attack.  · Stroke.  · Being very overweight (obese).  · Diabetes.  · Heart failure.  · Irregular heartbeat.  The goal of treatment is to help you breathe normally again.  What are the causes?  There are three kinds of sleep apnea:  · Obstructive sleep apnea. This is caused by a blocked or collapsed airway.  · Central sleep apnea. This happens when the brain does not send the right signals to the muscles that control breathing.  · Mixed sleep apnea. This is a combination of obstructive and central sleep apnea.  The most common cause of this condition is a collapsed or blocked airway. This can happen if:  · Your throat muscles are too relaxed.  · Your tongue and tonsils are too large.  · You are overweight.  · Your airway is too small.  What increases the risk?  · Being overweight.  · Smoking.  · Having a small airway.  · Being older.  · Being male.  · Drinking alcohol.  · Taking medicines to calm yourself (sedatives or tranquilizers).  · Having family members with the condition.  What are the signs or symptoms?  · Trouble staying asleep.  · Being sleepy or tired during the day.  · Getting angry a lot.  · Loud snoring.  · Headaches in the morning.  · Not being able to focus your mind (concentrate).  · Forgetting things.  · Less interest in sex.  · Mood swings.  · Personality changes.  · Feelings of sadness (depression).  · Waking up a lot during the night to pee (urinate).  · Dry mouth.  · Sore throat.  How is this diagnosed?  · Your medical history.  · A physical exam.  · A test that is done when you are sleeping (sleep study). The test is most often done in a sleep lab but may also be done at home.  How is this treated?    · Sleeping on your side.  · Using a medicine to get rid of mucus in your nose (decongestant).  · Avoiding the use of alcohol, medicines to help you relax, or certain pain  medicines (narcotics).  · Losing weight, if needed.  · Changing your diet.  · Not smoking.  · Using a machine to open your airway while you sleep, such as:  ? An oral appliance. This is a mouthpiece that shifts your lower jaw forward.  ? A CPAP device. This device blows air through a mask when you breathe out (exhale).  ? An EPAP device. This has valves that you put in each nostril.  ? A BPAP device. This device blows air through a mask when you breathe in (inhale) and breathe out.  · Having surgery if other treatments do not work.  It is important to get treatment for sleep apnea. Without treatment, it can lead to:  · High blood pressure.  · Coronary artery disease.  · In men, not being able to have an erection (impotence).  · Reduced thinking ability.  Follow these instructions at home:  Lifestyle  · Make changes that your doctor recommends.  · Eat a healthy diet.  · Lose weight if needed.  · Avoid alcohol, medicines to help you relax, and some pain medicines.  · Do not use any products that contain nicotine or tobacco, such as cigarettes, e-cigarettes, and chewing tobacco. If you need help quitting, ask your doctor.  General instructions  · Take over-the-counter and prescription medicines only as told by your doctor.  · If you were given a machine to use while you sleep, use it only as told by your doctor.  · If you are having surgery, make sure to tell your doctor you have sleep apnea. You may need to bring your device with you.  · Keep all follow-up visits as told by your doctor. This is important.  Contact a doctor if:  · The machine that you were given to use during sleep bothers you or does not seem to be working.  · You do not get better.  · You get worse.  Get help right away if:  · Your chest hurts.  · You have trouble breathing in enough air.  · You have an uncomfortable feeling in your back, arms, or stomach.  · You have trouble talking.  · One side of your body feels weak.  · A part of your face is  hanging down.  These symptoms may be an emergency. Do not wait to see if the symptoms will go away. Get medical help right away. Call your local emergency services (911 in the [place] Do not drive yourself to the hospital.  Summary  · This condition affects breathing during sleep.  · The most common cause is a collapsed or blocked airway.  · The goal of treatment is to help you breathe normally while you sleep.  This information is not intended to replace advice given to you by your health care provider. Make sure you discuss any questions you have with your health care provider.  Document Revised: 10/04/2019 Document Reviewed: 08/13/2019  [employer] Patient Education © 2021 [employer].    Electronic Cigarette Information    Electronic cigarettes, or e-cigarettes, are battery-operated devices that deliver nicotine--a very addictive drug--to the body. They come in many shapes, including in the shape of a cigarette, pipe, pen, and even a USB memory stick. E-cigarettes have a cartridge that contains a liquid form of nicotine. When a person uses the device, the liquid heats up. It then becomes a vapor. Inhaling this vapor is called vaping.  Nicotine is thought to increase your risk for certain types of cancer. In addition to nicotine, e-cigarettes may contain other harmful and cancer-causing chemicals, including:  · Formaldehyde.  · Acetaldehyde.  · Heavy metals.  · Ultra-fine particles that can get inhaled deep into the lungs.  · Chemical colorings and flavorings.  It is not clear how much nicotine you get when vaping, and it is hard to know what chemicals are in the vaping liquids. The health effects of vaping are not completely known, but you should be aware of the possible dangers of using these products.  Some people may use e-cigarettes in order to quit smoking tobacco. However, this has not been proven to work, and the Food and Drug Administration (FDA) has not approved e-cigarettes for this purpose.  How can  using electronic cigarettes affect me?  · You may be at risk for developing a dangerous lung disease. There are reports of an increasing number of cases involving serious lung problems, and even death, associated with e-cigarette use. Your risk may be even higher if you:  ? Buy e-cigarettes or vaping oils off the street.  ? Add any substances to the e-cigarettes that are not intended by the .  · Vaping may make you crave nicotine. Nicotine does the following:  ? Changes your blood sugar levels.  ? Increases your heart rate, blood pressure, and breathing rate.  ? Increases your risk of developing blood clots (hypercoagulable state) and diabetes.  ? Increases your risk of gum disease that may lead to losing teeth.  · If you smoke e-cigarettes, you may be more likely to start smoking or to smoke more tobacco cigarettes.  · Becoming addicted to nicotine may make your brain more sensitive to other addictive drugs. You may move to other addictive substances.  · You may be in danger of overdosing on nicotine. Nicotine poisoning can cause nausea, vomiting, seizures, and trouble breathing.  · An e-cigarette may explode and cause fires and burn injuries.  · If you are pregnant, the nicotine in e-cigarettes may be harmful to your baby. Nicotine can cause:  ? Brain or lung problems for your baby.  ? Your baby to be born too early.  ? Your baby to be born with a low birth weight.  · Vaping has also been linked to decreases in memory and attention span in children and teens.  What actions can I take to stop vaping?  If you can, stop vaping on your own before you become addicted to nicotine. If you need help quitting, ask your health care provider. There are three effective ways to fight nicotine addiction:  · Nicotine replacement therapy. Using nicotine gum or a nicotine patch blocks your craving for nicotine. Over time, you can reduce the amount of nicotine you use until you can stop using nicotine completely without  having cravings.  · Prescription medicines approved to fight nicotine addiction. These stop nicotine cravings or block the effects of nicotine.  · Behavioral therapy. This may include:  ? A self-help smoking cessation program.  ? Individual or group therapy.  ? A smoking cessation support group.  There are several national programs to help you quit smoking or vaping. These include:  · Text message programs, such as SmokefreeTXT.  · Apps for mobile phones, including the free Quik.io obi.  · Hotlines, such as 1-800-QUIT-NOW (1-924.900.9528).  Where to find support  You can get support at these sites:  · [place]. Department of Health and Human Services: https://smokefree.gov  · American Lung Association: www.lung.org  Where to find more information  Learn more about e-cigarettes from:  · National [place] on Drug Abuse: www.drugabuse.gov  · Centers for Disease Control and Prevention: www.cdc.gov  Summary  · E-cigarettes can cause nicotine addiction.  · E-cigarettes are not approved as a way to stop smoking. They are not a risk-free alternative to smoking tobacco.  · There are reports of an increasing number of cases involving serious lung problems, and even death, associated with e-cigarette use.  · If you can stop vaping on your own, do it before you become addicted to nicotine. If you need help quitting, ask your health care provider.  · There are various methods and programs that can help you stop smoking or vaping.  This information is not intended to replace advice given to you by your health care provider. Make sure you discuss any questions you have with your health care provider.  Document Revised: 04/14/2020 Document Reviewed: 02/28/2020  Elsevier Patient Education © 2021 [employer]vier Inc.

## 2021-11-09 NOTE — OUTREACH NOTE
Medical Week 2 Survey      Responses   [facility] patient discharged from? [facility]   Does the patient have one of the following disease processes/diagnoses(primary or secondary)? Other   Week 2 attempt successful? Yes   Call start time 1338   Discharge diagnosis Pacemaker placement    Call end time 1346   Person spoke with today (if not patient) and relationship    Meds reviewed with patient/caregiver? Yes   Is the patient having any side effects they believe may be caused by any medication additions or changes? No   Does the patient have all medications ordered at discharge? Yes   Is the patient taking all medications as directed (includes completed medication regime)? Yes   Medication comments started on ATB for URI per PCP--enc'd to update pulmonary at appt today   Comments regarding appointments Pulmonary f/u 11/9/21   Does the patient have a primary care provider?  Yes   Has the patient kept scheduled appointments due by today? Yes   Has home health visited the patient within 72 hours of discharge? N/A   Psychosocial issues? No   Did the patient receive a copy of their discharge instructions? Yes   Nursing interventions Reviewed instructions with patient   What is the patient's perception of their health status since discharge? Improving  [Pacemaker site--w/o s/s infection, dressing removed at her f/u appt.  Patient now has an URI and is being treated w/ ATB.]   Is the patient/caregiver able to teach back signs and symptoms related to disease process for when to call PCP? Yes   Is the patient/caregiver able to teach back signs and symptoms related to disease process for when to call 911? Yes   Additional teach back comments Surgical precautions reviewed--as pt is s/p pacemaker insertion,  reports pt is left-handed and is reminded quickly w/pain when she overextends.   Week 2 Call Completed? Yes          Echo Lewis RN

## 2021-11-12 ENCOUNTER — HOSPITAL ENCOUNTER (OUTPATIENT)
Dept: SLEEP MEDICINE | Facility: HOSPITAL | Age: 66
Discharge: HOME | End: 2021-11-12
Admitting: SPECIALIST

## 2021-11-12 DIAGNOSIS — G47.33 OSA (OBSTRUCTIVE SLEEP APNEA): ICD-10-CM

## 2021-11-12 DIAGNOSIS — E66.09 CLASS 2 OBESITY DUE TO EXCESS CALORIES WITH BODY MASS INDEX (BMI) OF 35.0 TO 35.9 IN ADULT, UNSPECIFIED WHETHER SERIOUS COMORBIDITY PRESENT: ICD-10-CM

## 2021-11-12 PROCEDURE — 95806 SLEEP STUDY UNATT&RESP EFFT: CPT

## 2021-11-12 PROCEDURE — 95806 SLEEP STUDY UNATT&RESP EFFT: CPT | Performed by: INTERNAL MEDICINE

## 2021-11-17 ENCOUNTER — READMISSION MANAGEMENT (OUTPATIENT)
Dept: CALL CENTER | Facility: HOSPITAL | Age: 66
End: 2021-11-17

## 2021-11-17 NOTE — OUTREACH NOTE
Medical Week 3 Survey      Responses   [facility] [place] patient discharged from? [facility]   Does the patient have one of the following disease processes/diagnoses(primary or secondary)? Other   Week 3 attempt successful? Yes   Call start time 1133   Call end time 1135   Discharge diagnosis Pacemaker placement    Meds reviewed with patient/caregiver? Yes   Is the patient taking all medications as directed (includes completed medication regime)? Yes   Comments regarding appointments Pulmonary f/u 11/9/21-completed   Does the patient have a primary care provider?  Yes   Has the patient kept scheduled appointments due by today? Yes   Comments CT scan Nov 23rd   Has home health visited the patient within 72 hours of discharge? N/A   Psychosocial issues? No   Did the patient receive a copy of their discharge instructions? Yes   Nursing interventions Reviewed instructions with patient   What is the patient's perception of their health status since discharge? Improving   Is the patient/caregiver able to teach back signs and symptoms related to disease process for when to call PCP? Yes   Is the patient/caregiver able to teach back signs and symptoms related to disease process for when to call 911? Yes   Is the patient/caregiver able to teach back the hierarchy of who to call/visit for symptoms/problems? PCP, Specialist, Home health nurse, Urgent Care, ED, 911 Yes   If the patient is a current smoker, are they able to teach back resources for cessation? --  [Stopped Vaping and smoking. ]   Week 3 Call Completed? Yes   Wrap up additional comments pt reports that she is doing better. No questions at this time.           Sumi Leach RN

## 2021-11-18 NOTE — PROCEDURES
Pacemaker procedure note Procedure: Dual-chamber permanent pacemaker Indication:    Complete heart block Complications: None     Description: Written informed consent was obtained.  The patient was brought to the cardiac catheterization lab in a fasting state.  Prepped and draped sterilely for left subclavian access.  Preoperative antibiotics were administered.  Local anesthesia was infiltrated around the subclavian area and conscious sedation was administered.  An iv venogram was done to confirm patency of the axillary vein. An incision was then made and carried down to the pectoral fascial plane.  A pocket was made in the pectoral fascial plane with both electrocautery and blunt dissection.  Using a micropuncture kit I gained access twice of the left subclavian/axillary vein and J-tipped guidewires were placed.  Two 6 [place] sheaths were placed over the wires and the wires removed.  Under fluoroscopy the right ventricular lead was delivered to the right ventricular septal area using a combination of straight and curved stylets.  The pacing and sensing parameters were acceptable.  The slack was optimized.  The sheath was removed.  The lead was sewn into the pocket using 0-silk suture.  The atrial lead was delivered through the other sheath to the atrial appendage.  The lead tip was deployed.  The pacing and sensing parameters were acceptable.  The slack was optimized.  The sheath was removed.  The lead was sewn into the pocket using 0 silk suture.  The leads were connected to the pulse generator and the torque screws were tightened.  The pocket was washed with normal saline solution.  Hemostasis appeared excellent.  The lead slack and generator were placed into the pocket and the pocket was closed with a  2-0 vicryl layer for the fasica and 4-0 Vicryl suture for the subcuticular layer.  Counts were correct.  The patient tolerated the procedure well.  There were no early complications.  Blood loss minimal Device  implanted: Dual-chamber pacemaker [employer] Accolade Right atrial Ingevity lead model #7840 serial #[id number] Right ventricle Ingevity lead active-fixation model #7841 serial #[id number] Atrial lead: Sensing 5.3 mV threshold 0.5 V at 0.4 ms impedance 727 ohms Right ventricular lead: Sensing 25.0 mV threshold 0.5 V 1.4 ms impedance 885 ohms Pacing mode DDDR       Conclusions: Successful dual-chamber permanent pacemaker implant Management plan: The patient will be observed overnight, the device will be interrogated in the morning, outpatient follow-up one week

## 2021-11-23 ENCOUNTER — HOSPITAL ENCOUNTER (OUTPATIENT)
Dept: CT IMAGING | Facility: HOSPITAL | Age: 66
Discharge: HOME | End: 2021-11-23
Admitting: SPECIALIST

## 2021-11-23 DIAGNOSIS — IMO0001 LUNG NODULE < 6CM ON CT: ICD-10-CM

## 2021-11-23 DIAGNOSIS — R06.09 DYSPNEA ON EXERTION: ICD-10-CM

## 2021-11-23 DIAGNOSIS — Z87.891 EX-SMOKER: ICD-10-CM

## 2021-11-23 PROCEDURE — 71250 CT THORAX DX C-: CPT

## 2021-11-24 ENCOUNTER — READMISSION MANAGEMENT (OUTPATIENT)
Dept: CALL CENTER | Facility: HOSPITAL | Age: 66
End: 2021-11-24

## 2022-02-02 NOTE — PATIENT INSTRUCTIONS
Managing the Challenge of Quitting Smoking  Quitting smoking is a physical and mental challenge. You will face cravings, withdrawal symptoms, and temptation. Before quitting, work with your health care provider to make a plan that can help you manage quitting. Preparation can help you quit and keep you from giving in.  How to manage lifestyle changes  Managing stress  Stress can make you want to smoke, and wanting to smoke may cause stress. It is important to find ways to manage your stress. You might try some of the following:  · Practice relaxation techniques.  ? Breathe slowly and deeply, in through your nose and out through your mouth.  ? Listen to music.  ? Soak in a bath or take a shower.  ? Imagine a peaceful place or vacation.  · Get some support.  ? Talk with family or friends about your stress.  ? Join a support group.  ? Talk with a counselor or therapist.  · Get some physical activity.  ? Go for a walk, run, or bike ride.  ? Play a favorite sport.  ? Practice yoga.    Medicines  Talk with your health care provider about medicines that might help you deal with cravings and make quitting easier for you.  Relationships  Social situations can be difficult when you are quitting smoking. To manage this, you can:  · Avoid parties and other social situations where people might be smoking.  · Avoid alcohol.  · Leave right away if you have the urge to smoke.  · Explain to your family and friends that you are quitting smoking. Ask for support and let them know you might be a bit grumpy.  · Plan activities where smoking is not an option.  General instructions  Be aware that many people gain weight after they quit smoking. However, not everyone does. To keep from gaining weight, have a plan in place before you quit and stick to the plan after you quit. Your plan should include:  · Having healthy snacks. When you have a craving, it may help to:  ? Eat popcorn, carrots, celery, or other cut vegetables.  ? Chew  sugar-free gum.  · Changing how you eat.  ? Eat small portion sizes at meals.  ? Eat 4-6 small meals throughout the day instead of 1-2 large meals a day.  ? Be mindful when you eat. Do not watch television or do other things that might distract you as you eat.  · Exercising regularly.  ? Make time to exercise each day. If you do not have time for a long workout, do short bouts of exercise for 5-10 minutes several times a day.  ? Do some form of strengthening exercise, such as weight lifting.  ? Do some exercise that gets your heart beating and causes you to breathe deeply, such as walking fast, running, swimming, or biking. This is very important.  · Drinking plenty of water or other low-calorie or no-calorie drinks. Drink 6-8 glasses of water daily.    How to recognize withdrawal symptoms  Your body and mind may experience discomfort as you try to get used to not having nicotine in your system. These effects are called withdrawal symptoms. They may include:  · Feeling hungrier than normal.  · Having trouble concentrating.  · Feeling irritable or restless.  · Having trouble sleeping.  · Feeling depressed.  · Craving a cigarette.  To manage withdrawal symptoms:  · Avoid places, people, and activities that trigger your cravings.  · Remember why you want to quit.  · Get plenty of sleep.  · Avoid coffee and other caffeinated drinks. These may worsen some of your symptoms.  These symptoms may surprise you. But be assured that they are normal to have when quitting smoking.  How to manage cravings  Come up with a plan for how to deal with your cravings. The plan should include the following:  · A definition of the specific situation you want to deal with.  · An alternative action you will take.  · A clear idea for how this action will help.  · The name of someone who might help you with this.  Cravings usually last for 5-10 minutes. Consider taking the following actions to help you with your plan to deal with  cravings:  · Keep your mouth busy.  ? Chew sugar-free gum.  ? Suck on hard candies or a straw.  ? Brush your teeth.  · Keep your hands and body busy.  ? Change to a different activity right away.  ? Squeeze or play with a ball.  ? Do an activity or a hobby, such as making bead jewelry, practicing needlepoint, or working with wood.  ? Mix up your normal routine.  ? Take a short exercise break. Go for a quick walk or run up and down stairs.  · Focus on doing something kind or helpful for someone else.  · Call a friend or family member to talk during a craving.  · Join a support group.  · Contact a quitline.  Where to find support  To get help or find a support group:  · Call the National Cancer [place]'s Smoking Quitline: 5-377-QUIT NOW (773-4132)  · Visit the website of the Substance Abuse and Mental Health Services Administration: www.samhsa.gov  · Text QUIT to SmokefreeTXT: 736864  Where to find more information  Visit these websites to find more information on quitting smoking:  · National Cancer [place]: www.smokefree.gov  · American Lung Association: www.lung.org  · American Cancer Society: www.cancer.org  · Centers for Disease Control and Prevention: www.cdc.gov  · American Heart Association: www.heart.org  Contact a health care provider if:  · You want to change your plan for quitting.  · The medicines you are taking are not helping.  · Your eating feels out of control or you cannot sleep.  Get help right away if:  · You feel depressed or become very anxious.  Summary  · Quitting smoking is a physical and mental challenge. You will face cravings, withdrawal symptoms, and temptation to smoke again. Preparation can help you as you go through these challenges.  · Try different techniques to manage stress, handle social situations, and prevent weight gain.  · You can deal with cravings by keeping your mouth busy (such as by chewing gum), keeping your hands and body busy, calling family or friends, or  contacting a quitline for people who want to quit smoking.  · You can deal with withdrawal symptoms by avoiding places where people smoke, getting plenty of rest, and avoiding drinks with caffeine.  This information is not intended to replace advice given to you by your health care provider. Make sure you discuss any questions you have with your health care provider.  Document Revised: 10/06/2020 Document Reviewed: 10/06/2020  [employer] Patient Education © 2021 [employer]    Things to Know about the COVID-19 Pandemic  Things to Know about the COVID-19 Pandemic  Important Ways to Slow the Spread  · Wear a mask that covers your nose and mouth to help protect yourself and others.  · Stay 6 feet apart from others who don't live with you.  · Get a COVID-19 vaccine when it is available to you.  · Avoid crowds and poorly ventilated indoor spaces.  · Wash your hands often with soap and water. Use hand  if soap and water aren't available.  If you are at risk of getting very sick  · People of any age, even healthy young adults and children, can get COVID-19.  · People who are older or have certain underlying medical conditions are at higher risk of getting very sick from COVID-19.  · Other groups may be at higher risk for getting COVID-19 or having more severe illness.  Getting a COVID-19 Vaccine  · Authorized COVID-19 vaccines can help protect you from COVID-19.  · You should get a COVID-19 vaccine when it is available to you.  · Once you are fully vaccinated, you may be able to start doing some things that you had stopped doing because of the pandemic.  What to do if you're sick  · Stay home except to get medical care. If you have symptoms of COVID-19, contact your healthcare provider and get tested.  · Isolate yourself from others, including those living in your household, to prevent spread to them and the people that they may have contact with, like grandparents.  · Call 911 if you are having emergency warning  signs, like trouble breathing, pain or pressure in chest.  How to get a test for current infection  · Visit your state, [facility], local, and [facility] health department'swebsite to look for the latest local information on testing.  · Talk to your healthcare provider about getting tested. You and your healthcare provider might consider either in-person testing, an at-home collection kit, or an at-home test.  · If you have symptoms of COVID-19, or if you have not been vaccinated and have been in close contact with someone with COVID-19, it is still important to stay home even if you are not tested.  What symptoms to watch for  The most common symptoms of COVID-19 are  · Fever  · Cough  · Headaches  · Fatigue  · Muscle or body aches  · Loss of taste or smell  · Sore throat  · Nausea  · Diarrhea  Other symptoms are signs of serious illness. If someone has trouble breathing, chest pain or pressure, or difficulty staying awake, get medical care immediately.  I wear a mask because...  CDC staff give their reasons for wearing a mask.  Wear a mask because  Content source: National Center for Immunization and Respiratory Diseases (NCIRD), Division of Viral Diseases  03/17/2021  This information is not intended to replace advice given to you by your health care provider. Make sure you discuss any questions you have with your health care provider.  Document Revised: 04/19/2021 Document Reviewed: 04/19/2021  [employer] Patient Education © 2021 [employer] Inc.

## 2022-02-02 NOTE — PROGRESS NOTES
Primary Care Provider  Chucho Colon     Referring Provider  No ref. provider found     Chief Complaint  Lung Nodule    Subjective          History of Presenting Illness  Patient is a 66-year-old female last seen by Dr. Orosco who was referred to pulmonary practice for lung nodule. Patient presents for follow-up visit today.  Patient's  is present with patient in the office today.  Patient had some chest discomfort and was taken the emergency room and found to have significant cardiac arrhythmias and patient had pacemaker placed.  Symptoms improved, however patient was still feeling fatigued.  During the process patient did have a chest CT scan completed without contrast showing a 6 mm lung nodule and patient was referred to our office.  Patient states that since last visit her breathing has improved.  Patient states that she did have a pacemaker placed back in October, 2021 and she is now able to walk up and down stairs without getting short of breath.  Patient states she is no longer taking 4-hour naps.  Patient states that she did quit smoking and is no longer using electronic cigarettes. Since last office visit patient had a high-resolution chest CT scan completed on 11/23/2021.  Report showed decrease in size of right upper lobe pulmonary nodule now measuring 5 mm. No evidence of interstitial lung disease.  There has been complete resolution of previously demonstrated interlobular septal thickening compatible with resolution of interstitial edema.  Patient also had a sleep study completed on 11/12/2021.  Results showed snoring and upper airway resistance syndrome recommending patient lose weight and sleep on side and sleep hygiene with regular sleep time and wake time.  Patient also advised to avoid alcohol and other sedative medications and use oral appliance for snoring.  Patient denies fever, chills, night sweats, swollen glands in the head and neck, unintentional weight loss, hemoptysis,  purulent sputum production, dysphagia, chest pain, palpitations, chest tightness, abdominal pain, nausea, vomiting, and diarrhea.  Patient also denies any myalgias, changes in sense of taste and/or smell, sore throat, any other coronavirus or flu-like symptoms.  Patient denies any leg swelling, orthopnea, paroxysmal nocturnal dyspnea.  Patient is able to perform activities of daily living.       Review of Systems   Constitutional: Negative for activity change, appetite change, chills, diaphoresis, fatigue, fever, unexpected weight gain and unexpected weight loss.        Negative for Insomnia   HENT: Negative for congestion (Nasal), mouth sores, nosebleeds, postnasal drip, sore throat, swollen glands and trouble swallowing.         Negative for Thrush  Negative for Hoarseness  Negative for Allergies/Hay Fever  Negative for Recent Head injury  Negative for Ear Fullness  Negative for Nasal or Sinus pain  Negative for Dry lips  Negative for Nasal discharge   Respiratory: Positive for shortness of breath (improved since last office visit per pt report). Negative for apnea, cough, chest tightness and wheezing.         Negative for Hemoptysis  Negative for Pleuritic pain   Cardiovascular: Negative for chest pain, palpitations and leg swelling.        Negative for Claudication  Negative for Cyanosis  Negative for Dyspnea on exertion   Gastrointestinal: Negative for abdominal pain, diarrhea, nausea, vomiting and GERD.   Musculoskeletal: Negative for joint swelling and myalgias.        Negative for Joint pain  Negative for Joint stiffness   Skin: Negative for color change, dry skin, pallor and rash.   Neurological: Negative for syncope, weakness and headache.   Hematological: Negative for adenopathy. Does not bruise/bleed easily.        History reviewed. No pertinent family history.     Social History     Socioeconomic History   • Marital status:    Tobacco Use   • Smoking status: Former Smoker     Years: 40.00      Types: Cigarettes     Quit date: 2017     Years since quittin.8   • Smokeless tobacco: Never Used   Vaping Use   • Vaping Use: Former   • Substances: Nicotine   • Devices: Refillable tank   Substance and Sexual Activity   • Alcohol use: Not Currently   • Drug use: Never   • Sexual activity: Not Currently        Past Medical History:   Diagnosis Date   • Arthritis    • CHB s/p cardiac pacemaker 10/25/2021   • Chronic HFrEF (heart failure with reduced ejection fraction)  10/25/2021    10/25/21 echo: Estimated right ventricular systolic pressure from tricuspid regurgitation is markedly elevated (>55 mmHg). Mild LVH. Estimated left ventricular EF = 45% Left ventricular systolic function is mildly decreased.     • GERD (gastroesophageal reflux disease)    • Glaucoma     LEFT EYE   • Hypertension    • Osteoporosis         Immunization History   Administered Date(s) Administered   • Fluzone High Dose =>65 Years ([employer] ONLY) 10/08/2020       No Known Allergies       Current Outpatient Medications:   •  alendronate (FOSAMAX) 70 MG tablet, Take 1 tablet by mouth Every 7 (Seven) Days. Pt takes on , Disp: , Rfl:   •  brimonidine (ALPHAGAN) 0.2 % ophthalmic solution, Administer 1 drop to both eyes 2 (two) times a day., Disp: , Rfl:   •  calcium carbonate (TUMS) 500 MG chewable tablet, Chew 1 tablet Daily., Disp: , Rfl:   •  cholecalciferol (VITAMIN D3) 25 MCG (1000 UT) tablet, Take 1.25 Units by mouth Take As Directed. Every 5 days, Disp: , Rfl:   •  dorzolamide-timolol (COSOPT) 22.3-6.8 MG/ML ophthalmic solution, Administer 1 drop to both eyes 2 (Two) Times a Day., Disp: , Rfl:   •  ergocalciferol (ERGOCALCIFEROL) 1.25 MG (97938 UT) capsule, Take 50,000 Units by mouth Take As Directed. Pt takes every 5 days, Disp: , Rfl:   •  hyoscyamine (ANASPAZ,LEVSIN) 0.125 MG tablet, Take 0.125 mg by mouth Every 4 (Four) Hours As Needed for Cramping., Disp: , Rfl:   •  latanoprost (XALATAN) 0.005 % ophthalmic solution,  "Administer 1 drop to both eyes Every Night., Disp: , Rfl:   •  lisinopril (PRINIVIL,ZESTRIL) 5 MG tablet, Take 1 tablet by mouth Daily., Disp: 90 tablet, Rfl: 1  •  Netarsudil Dimesylate (Rhopressa) 0.02 % solution, Apply 1 drop to eye(s) as directed by provider Daily., Disp: , Rfl:   •  pantoprazole (PROTONIX) 40 MG EC tablet, Take 40 mg by mouth Daily., Disp: , Rfl:   •  HYDROcod Polst-CPM Polst ER (TUSSIONEX PENNKINETIC) 10-8 MG/5ML ER suspension, , Disp: , Rfl:   •  HYDROcodone-acetaminophen (NORCO) 5-325 MG per tablet, Take 1 tablet by mouth Every 6 (Six) Hours As Needed for Moderate Pain  or Severe Pain  for up to 12 doses., Disp: 12 tablet, Rfl: 0     Objective     Physical Exam  Vital Signs Reviewed  WDWN, Alert, NAD.    HEENT:  PERRL, EOMI.  OP, nares clear, no sinus tenderness  Neck:  Supple, no JVD, no thyromegaly.  Lymph: no axillary, cervical, supraclavicular lymphadenopathy noted bilaterally  Chest:  good aeration, clear to auscultation bilaterally, tympanic to percussion bilaterally, no work of breathing noted  CV: RRR, no MGR, pulses 2+, equal.  Abd:  Soft, NT, ND, + BS, no HSM  EXT:  no clubbing, no cyanosis, no edema, no joint tenderness  Neuro:  A&Ox3, CN grossly intact, no focal deficits.  Skin: No rashes or lesions noted.    Vital Signs:   /82   Pulse 79   Resp 14   Ht 160 cm (63\")   Wt 93.4 kg (206 lb)   SpO2 100% Comment: room air  BMI 36.49 kg/m²         Result Review :   I have personally reviewed Dr. Orosco's last office visit note.         Assessment and Plan      Assessment:  1.  Lung nodule.  Recent chest CT scan report showed decrease in size of right upper lobe pulmonary nodule now measuring 5 mm.   2.  Cough, resolved per pt report.  3.  Dyspnea, improved per pt report.  4.  Snoring/Upper airway resistance.  5.  Cardiac arrhythmia status post pacemaker placement.  6.  Tobacco abuse of cigarettes in remission.    Plan:  1.  Patient is scheduled to have pulmonary function " test and 6-minute walk test on 3/1/2022 at 4:30 PM.  2.  Will repeat chest CT scan again in 6 months.  Order placed today.  3.  Follow-up with cardiology as scheduled.  4.  Smoking status: former cigarette smoker.   5.  Vaccination status: Fluzone vaccine given to patient in the office today.  Patient reports they are up-to-date with  Covid vaccines.  We will give patient Prevnar 13 vaccine next office visit as patient does not want to receive two vaccines at the same time.  Patient is advised to continue to follow CDC recommendations such as social distancing wearing a mask and washing hands for at least 20 seconds.  6.  Patient to call the office, 911, or go to the ER with new or worsening symptoms.  7. For snoring R 06.83 /upper airway resistance, patient is advised to lose weight. Patient is advised to sleep on the side, patient can use body pillow to prevent rollover.   Sleep hygiene, with the regular sleep time and wake time.   Advised to avoid alcohol and other sedative medications.   Patient can also use oral appliance for snoring.  8.  Follow-up in 2 to 3 months, sooner if needed.          Follow Up   Return for end of May with MD.  Patient was given instructions and counseling regarding her condition or for health maintenance advice. Please see specific information pulled into the AVS if appropriate.

## 2022-02-09 ENCOUNTER — OFFICE VISIT (OUTPATIENT)
Dept: PULMONOLOGY | Facility: CLINIC | Age: 67
End: 2022-02-09

## 2022-02-09 VITALS
WEIGHT: 206 LBS | BODY MASS INDEX: 36.5 KG/M2 | OXYGEN SATURATION: 100 % | RESPIRATION RATE: 14 BRPM | DIASTOLIC BLOOD PRESSURE: 82 MMHG | SYSTOLIC BLOOD PRESSURE: 146 MMHG | HEART RATE: 79 BPM | HEIGHT: 63 IN

## 2022-02-09 DIAGNOSIS — R06.83 SNORING: ICD-10-CM

## 2022-02-09 DIAGNOSIS — R91.1 LUNG NODULE: Primary | ICD-10-CM

## 2022-02-09 DIAGNOSIS — R05.9 COUGH: ICD-10-CM

## 2022-02-09 DIAGNOSIS — F17.201 TOBACCO ABUSE, IN REMISSION: ICD-10-CM

## 2022-02-09 DIAGNOSIS — G47.8 UPPER AIRWAY RESISTANCE SYNDROME: ICD-10-CM

## 2022-02-09 DIAGNOSIS — R06.00 DYSPNEA, UNSPECIFIED TYPE: ICD-10-CM

## 2022-02-09 PROCEDURE — 99214 OFFICE O/P EST MOD 30 MIN: CPT | Performed by: NURSE PRACTITIONER

## 2022-02-09 PROCEDURE — 90662 IIV NO PRSV INCREASED AG IM: CPT | Performed by: NURSE PRACTITIONER

## 2022-02-09 PROCEDURE — G0008 ADMIN INFLUENZA VIRUS VAC: HCPCS | Performed by: NURSE PRACTITIONER

## 2022-03-01 ENCOUNTER — PROCEDURE VISIT (OUTPATIENT)
Dept: CARDIAC REHAB | Facility: HOSPITAL | Age: 67
End: 2022-03-01

## 2022-03-01 ENCOUNTER — LAB (OUTPATIENT)
Dept: LAB | Facility: HOSPITAL | Age: 67
End: 2022-03-01

## 2022-03-01 ENCOUNTER — HOSPITAL ENCOUNTER (OUTPATIENT)
Dept: RESPIRATORY THERAPY | Facility: HOSPITAL | Age: 67
Discharge: HOME | End: 2022-03-01

## 2022-03-01 ENCOUNTER — TRANSCRIBE ORDERS (OUTPATIENT)
Dept: LAB | Facility: HOSPITAL | Age: 67
End: 2022-03-01

## 2022-03-01 DIAGNOSIS — Z87.891 EX-SMOKER: ICD-10-CM

## 2022-03-01 DIAGNOSIS — M81.0 SENILE OSTEOPOROSIS: Primary | ICD-10-CM

## 2022-03-01 DIAGNOSIS — Z79.899 ENCOUNTER FOR LONG-TERM (CURRENT) USE OF OTHER MEDICATIONS: ICD-10-CM

## 2022-03-01 DIAGNOSIS — R06.09 DYSPNEA ON EXERTION: ICD-10-CM

## 2022-03-01 DIAGNOSIS — M81.0 SENILE OSTEOPOROSIS: ICD-10-CM

## 2022-03-01 LAB
25(OH)D3 SERPL-MCNC: 51.8 NG/ML
CALCIUM SPEC-SCNC: 9.3 MG/DL (ref 8.6–10.5)
CREAT SERPL-MCNC: 0.94 MG/DL (ref 0.57–1)
EGFRCR SERPLBLD CKD-EPI 2021: 67.1 ML/MIN/1.73

## 2022-03-01 PROCEDURE — 94060 EVALUATION OF WHEEZING: CPT

## 2022-03-01 PROCEDURE — 94726 PLETHYSMOGRAPHY LUNG VOLUMES: CPT | Performed by: INTERNAL MEDICINE

## 2022-03-01 PROCEDURE — 94060 EVALUATION OF WHEEZING: CPT | Performed by: INTERNAL MEDICINE

## 2022-03-01 PROCEDURE — 82310 ASSAY OF CALCIUM: CPT

## 2022-03-01 PROCEDURE — 82565 ASSAY OF CREATININE: CPT

## 2022-03-01 PROCEDURE — 82306 VITAMIN D 25 HYDROXY: CPT

## 2022-03-01 PROCEDURE — 36415 COLL VENOUS BLD VENIPUNCTURE: CPT

## 2022-03-01 PROCEDURE — 94729 DIFFUSING CAPACITY: CPT | Performed by: INTERNAL MEDICINE

## 2022-03-01 PROCEDURE — 94726 PLETHYSMOGRAPHY LUNG VOLUMES: CPT

## 2022-03-01 PROCEDURE — 94618 PULMONARY STRESS TESTING: CPT

## 2022-03-01 PROCEDURE — 94729 DIFFUSING CAPACITY: CPT

## 2022-03-01 RX ORDER — ALBUTEROL SULFATE 2.5 MG/3ML
2.5 SOLUTION RESPIRATORY (INHALATION) ONCE
Status: COMPLETED | OUTPATIENT
Start: 2022-03-01 | End: 2022-03-01

## 2022-03-01 RX ADMIN — ALBUTEROL SULFATE 2.5 MG: 2.5 SOLUTION RESPIRATORY (INHALATION) at 17:28

## 2022-04-26 DIAGNOSIS — K22.719 BARRETT'S ESOPHAGUS WITH DYSPLASIA: Primary | ICD-10-CM

## 2022-04-26 RX ORDER — PANTOPRAZOLE SODIUM 40 MG/1
40 TABLET, DELAYED RELEASE ORAL DAILY
Qty: 30 TABLET | Refills: 3 | Status: SHIPPED | OUTPATIENT
Start: 2022-04-26 | End: 2022-09-02

## 2022-04-26 NOTE — TELEPHONE ENCOUNTER
I RECEIVED A FAX FROM PHARMACY REQUESTING REFILLS. I ATTACHED A NOTE TO PHARMACY STATING PATIENT WILL NEED AN APPT FOR FURTHER REFILLS.

## 2022-05-03 ENCOUNTER — TELEPHONE (OUTPATIENT)
Dept: CARDIOLOGY | Facility: CLINIC | Age: 67
End: 2022-05-03

## 2022-05-03 NOTE — TELEPHONE ENCOUNTER
----- Message from KEILA Barba sent at 5/3/2022 10:42 AM EDT -----  Notify pt echo result: EF 50% with some mild mitral regurgitation (murmur), will monitor with repeat echo in 1 year. Follow up as scheduled with dr lopez on 5/11

## 2022-05-09 ENCOUNTER — HOSPITAL ENCOUNTER (OUTPATIENT)
Dept: CT IMAGING | Facility: HOSPITAL | Age: 67
Discharge: HOME | End: 2022-05-09
Admitting: NURSE PRACTITIONER

## 2022-05-09 DIAGNOSIS — R05.9 COUGH: ICD-10-CM

## 2022-05-09 DIAGNOSIS — R91.1 LUNG NODULE: Primary | ICD-10-CM

## 2022-05-09 DIAGNOSIS — R91.1 LUNG NODULE: ICD-10-CM

## 2022-05-09 DIAGNOSIS — R06.00 DYSPNEA, UNSPECIFIED TYPE: ICD-10-CM

## 2022-05-09 DIAGNOSIS — G47.8 UPPER AIRWAY RESISTANCE SYNDROME: ICD-10-CM

## 2022-05-09 PROCEDURE — 71250 CT THORAX DX C-: CPT

## 2022-05-11 ENCOUNTER — OFFICE VISIT (OUTPATIENT)
Dept: CARDIOLOGY | Facility: CLINIC | Age: 67
End: 2022-05-11

## 2022-05-11 ENCOUNTER — CLINICAL SUPPORT NO REQUIREMENTS (OUTPATIENT)
Dept: CARDIOLOGY | Facility: CLINIC | Age: 67
End: 2022-05-11

## 2022-05-11 VITALS
SYSTOLIC BLOOD PRESSURE: 153 MMHG | DIASTOLIC BLOOD PRESSURE: 75 MMHG | BODY MASS INDEX: 36.68 KG/M2 | HEIGHT: 63 IN | WEIGHT: 207 LBS | HEART RATE: 69 BPM

## 2022-05-11 DIAGNOSIS — Z95.0 PRESENCE OF CARDIAC PACEMAKER: Primary | ICD-10-CM

## 2022-05-11 DIAGNOSIS — I50.22 CHRONIC HFREF (HEART FAILURE WITH REDUCED EJECTION FRACTION): ICD-10-CM

## 2022-05-11 DIAGNOSIS — I44.2 CHB (COMPLETE HEART BLOCK): ICD-10-CM

## 2022-05-11 DIAGNOSIS — I10 ESSENTIAL HYPERTENSION: ICD-10-CM

## 2022-05-11 PROCEDURE — 93280 PM DEVICE PROGR EVAL DUAL: CPT | Performed by: INTERNAL MEDICINE

## 2022-05-11 PROCEDURE — 99214 OFFICE O/P EST MOD 30 MIN: CPT | Performed by: INTERNAL MEDICINE

## 2022-05-11 RX ORDER — LISINOPRIL 10 MG/1
10 TABLET ORAL DAILY
Qty: 90 TABLET | Refills: 3 | Status: SHIPPED | OUTPATIENT
Start: 2022-05-11

## 2022-05-11 NOTE — PROGRESS NOTES
Chief Complaint  Chronic HFrEF, Hypertension, and Pacemaker    Subjective    Patient is doing well she does not report any severe weight gain edema has stable dyspnea on exertion symptoms    Past Medical History:   Diagnosis Date   • Arthritis    • CHB s/p cardiac pacemaker 10/25/2021   • Chronic HFrEF (heart failure with reduced ejection fraction)  10/25/2021    10/25/21 echo: Estimated right ventricular systolic pressure from tricuspid regurgitation is markedly elevated (>55 mmHg). Mild LVH. Estimated left ventricular EF = 45% Left ventricular systolic function is mildly decreased.     • GERD (gastroesophageal reflux disease)    • Glaucoma     LEFT EYE   • Hypertension    • Osteoporosis          Current Outpatient Medications:   •  alendronate (FOSAMAX) 70 MG tablet, Take 1 tablet by mouth Every 7 (Seven) Days. Pt takes on Wednesdays, Disp: , Rfl:   •  brimonidine (ALPHAGAN) 0.2 % ophthalmic solution, Administer 1 drop to both eyes 2 (two) times a day., Disp: , Rfl:   •  calcium carbonate (TUMS) 500 MG chewable tablet, Chew 1 tablet Daily., Disp: , Rfl:   •  cholecalciferol (VITAMIN D3) 25 MCG (1000 UT) tablet, Take 1.25 Units by mouth Take As Directed. Every 5 days, Disp: , Rfl:   •  dorzolamide-timolol (COSOPT) 22.3-6.8 MG/ML ophthalmic solution, Administer 1 drop to both eyes 2 (Two) Times a Day., Disp: , Rfl:   •  ergocalciferol (ERGOCALCIFEROL) 1.25 MG (28705 UT) capsule, Take 50,000 Units by mouth Take As Directed. Pt takes every 5 days, Disp: , Rfl:   •  hyoscyamine (ANASPAZ,LEVSIN) 0.125 MG tablet, Take 0.125 mg by mouth Every 4 (Four) Hours As Needed for Cramping., Disp: , Rfl:   •  latanoprost (XALATAN) 0.005 % ophthalmic solution, Administer 1 drop to both eyes Every Night., Disp: , Rfl:   •  Netarsudil Dimesylate (Rhopressa) 0.02 % solution, Apply 1 drop to eye(s) as directed by provider Daily., Disp: , Rfl:   •  pantoprazole (PROTONIX) 40 MG EC tablet, Take 1 tablet by mouth Daily for 30 days., Disp:  "30 tablet, Rfl: 3  •  lisinopril (PRINIVIL,ZESTRIL) 10 MG tablet, Take 1 tablet by mouth Daily., Disp: 90 tablet, Rfl: 3    Medications Discontinued During This Encounter   Medication Reason   • HYDROcod Polst-CPM Polst ER (TUSSIONEX PENNKINETIC) 10-8 MG/5ML ER suspension *Therapy completed   • HYDROcodone-acetaminophen (NORCO) 5-325 MG per tablet *Therapy completed   • lisinopril (PRINIVIL,ZESTRIL) 5 MG tablet      No Known Allergies     Social History     Tobacco Use   • Smoking status: Former Smoker     Years: 40.00     Types: Cigarettes     Quit date: 2017     Years since quittin.1   • Smokeless tobacco: Never Used   Vaping Use   • Vaping Use: Former   • Substances: Nicotine   • Devices: [employer]   Substance Use Topics   • Alcohol use: Not Currently   • Drug use: Never       History reviewed. No pertinent family history.     Objective     /75   Pulse 69   Ht 160 cm (63\")   Wt 93.9 kg (207 lb)   BMI 36.67 kg/m²       Physical Exam    General Appearance:   · no acute distress  · Alert and oriented x3  HENT:   · lips not cyanotic  · Atraumatic  Neck:  · No jvd   · supple  Respiratory:  · no respiratory distress  · normal breath sounds  · no rales  Cardiovascular:  · Regular rate and rhythm  · no S3, no S4   · no murmur  · no rub  Extremities  · No cyanosis  · lower extremity edema: none    Skin:   · warm, dry  · No rashes      Result Review :     proBNP   Date Value Ref Range Status   10/25/2021 2,605.0 (H) 0.0 - 900.0 pg/mL Final     CMP    CMP 10/26/21 10/27/21 3/1/22 3/1/22      0815 0815   Glucose 93 113 (A)     BUN 19 21     Creatinine 0.90 1.06 (A) 0.94    eGFR Non  Am 63 52 (A)     Sodium 139 135 (A)     Potassium 4.3 5.0     Chloride 107 103     Calcium 8.3 (A) 8.8  9.3   (A) Abnormal value            CBC w/diff    CBC w/Diff 10/25/21 10/26/21 10/27/21   WBC 6.55 6.51 5.65   RBC 5.07 4.61 4.80   Hemoglobin 14.7 13.6 14.0   Hematocrit 44.2 41.5 42.1   MCV 87.2 90.0 87.7   MCH " 29.0 29.5 29.2   MCHC 33.3 32.8 33.3   RDW 12.8 12.9 12.8   Platelets 320 276 276   Neutrophil Rel % 57.1  67.2   Immature Granulocyte Rel % 0.2  0.2   Lymphocyte Rel % 29.3  17.9 (A)   Monocyte Rel % 9.5  12.4 (A)   Eosinophil Rel % 3.1  1.6   Basophil Rel % 0.8  0.7   (A) Abnormal value             Lab Results   Component Value Date    TSH 1.190 11/25/2019      No results found for: FREET4   No results found for: DDIMERQUANT  Magnesium   Date Value Ref Range Status   10/26/2021 1.9 1.6 - 2.4 mg/dL Final      No results found for: DIGOXIN   Lab Results   Component Value Date    TROPONINT 0.013 10/26/2021             Lab Results   Component Value Date    POCTROP 0.08 10/25/2021       Results for orders placed in visit on 05/02/22    Adult Transthoracic Echo Complete W/ Cont if Necessary Per Protocol    Interpretation Summary  · Estimated left ventricular EF = 50% Left ventricular systolic function is low normal with area of apical septal hypokinesis  · Left ventricular wall thickness is consistent with mild concentric hypertrophy.  · The left ventricular cavity is mildly dilated.            Dual-chamber pacemaker interrogation were atrially paced 50% of the time working normally irregularly paced 100% time working normally pacemaker dependent patient did have her right atrial and right ventricular lead thresholds decreased to 2.2 V to preserve battery life      Diagnoses and all orders for this visit:    1. CHB s/p cardiac pacemaker (Primary)  Assessment & Plan:  Device working properly above-stated changes made to device continue home monitoring and device monitor checkup in 1 year      2. Chronic HFrEF (heart failure with reduced ejection fraction)   Assessment & Plan:  Patient with no symptomatic CHF issues titrating up on lisinopril 10 mg for further blood pressure control      3. Essential hypertension  Assessment & Plan:  Elevated increase lisinopril to 10 repeat BMP in 2 weeks encourage exercise  program    Orders:  -     Basic Metabolic Panel; Future    Other orders  -     lisinopril (PRINIVIL,ZESTRIL) 10 MG tablet; Take 1 tablet by mouth Daily.  Dispense: 90 tablet; Refill: 3          Follow Up     Return in about 6 months (around 11/11/2022) for EKG with F/U.          Patient was given instructions and counseling regarding her condition or for health maintenance advice. Please see specific information pulled into the AVS if appropriate.

## 2022-05-11 NOTE — ASSESSMENT & PLAN NOTE
Patient with no symptomatic CHF issues titrating up on lisinopril 10 mg for further blood pressure control

## 2022-05-11 NOTE — ASSESSMENT & PLAN NOTE
Device working properly above-stated changes made to device continue home monitoring and device monitor checkup in 1 year

## 2022-05-23 ENCOUNTER — LAB (OUTPATIENT)
Dept: LAB | Facility: HOSPITAL | Age: 67
End: 2022-05-23

## 2022-05-23 DIAGNOSIS — I10 ESSENTIAL HYPERTENSION: ICD-10-CM

## 2022-05-23 LAB
ANION GAP SERPL CALCULATED.3IONS-SCNC: 11.3 MMOL/L (ref 5–15)
BUN SERPL-MCNC: 24 MG/DL (ref 8–23)
BUN/CREAT SERPL: 29.6 (ref 7–25)
CALCIUM SPEC-SCNC: 9.2 MG/DL (ref 8.6–10.5)
CHLORIDE SERPL-SCNC: 104 MMOL/L (ref 98–107)
CO2 SERPL-SCNC: 22.7 MMOL/L (ref 22–29)
CREAT SERPL-MCNC: 0.81 MG/DL (ref 0.57–1)
EGFRCR SERPLBLD CKD-EPI 2021: 80.2 ML/MIN/1.73
GLUCOSE SERPL-MCNC: 81 MG/DL (ref 65–99)
POTASSIUM SERPL-SCNC: 4.6 MMOL/L (ref 3.5–5.2)
SODIUM SERPL-SCNC: 138 MMOL/L (ref 136–145)

## 2022-05-23 PROCEDURE — 80048 BASIC METABOLIC PNL TOTAL CA: CPT

## 2022-05-23 PROCEDURE — 36415 COLL VENOUS BLD VENIPUNCTURE: CPT

## 2022-05-23 RX ORDER — LISINOPRIL 10 MG/1
10 TABLET ORAL DAILY
Qty: 90 TABLET | Refills: 3 | Status: SHIPPED | OUTPATIENT
Start: 2022-05-23 | End: 2022-05-31 | Stop reason: SDUPTHER

## 2022-05-23 NOTE — TELEPHONE ENCOUNTER
Rx Refill Note  Requested Prescriptions     Signed Prescriptions Disp Refills   • lisinopril (PRINIVIL,ZESTRIL) 10 MG tablet 90 tablet 3     Sig: Take 1 tablet by mouth Daily.     Authorizing Provider: SULEMA HEATH     Ordering User: JUNE FERNANDEZ      Last office visit with prescribing clinician: 11/2/2021      Next office visit with prescribing clinician:11/30/2022            June Fernandez  05/23/22, 13:10 EDT      Matches instruction last OV note.  Verified with patients  that is what she is currently taking.

## 2022-05-31 ENCOUNTER — OFFICE VISIT (OUTPATIENT)
Dept: PULMONOLOGY | Facility: CLINIC | Age: 67
End: 2022-05-31

## 2022-05-31 VITALS
HEART RATE: 79 BPM | TEMPERATURE: 98.2 F | OXYGEN SATURATION: 94 % | HEIGHT: 63 IN | BODY MASS INDEX: 36.34 KG/M2 | DIASTOLIC BLOOD PRESSURE: 69 MMHG | SYSTOLIC BLOOD PRESSURE: 128 MMHG | RESPIRATION RATE: 18 BRPM | WEIGHT: 205.1 LBS

## 2022-05-31 DIAGNOSIS — R91.1 LUNG NODULE: Primary | ICD-10-CM

## 2022-05-31 DIAGNOSIS — G47.8 UPPER AIRWAY RESISTANCE SYNDROME: ICD-10-CM

## 2022-05-31 DIAGNOSIS — R06.00 DYSPNEA, UNSPECIFIED TYPE: ICD-10-CM

## 2022-05-31 DIAGNOSIS — R06.83 SNORING: ICD-10-CM

## 2022-05-31 DIAGNOSIS — F17.201 TOBACCO ABUSE, IN REMISSION: ICD-10-CM

## 2022-05-31 DIAGNOSIS — K44.9 HIATAL HERNIA: ICD-10-CM

## 2022-05-31 DIAGNOSIS — I50.22 CHRONIC HFREF (HEART FAILURE WITH REDUCED EJECTION FRACTION): ICD-10-CM

## 2022-05-31 DIAGNOSIS — R05.9 COUGH: ICD-10-CM

## 2022-05-31 PROCEDURE — 99214 OFFICE O/P EST MOD 30 MIN: CPT | Performed by: INTERNAL MEDICINE

## 2022-05-31 RX ORDER — TIOTROPIUM BROMIDE AND OLODATEROL 3.124; 2.736 UG/1; UG/1
2 SPRAY, METERED RESPIRATORY (INHALATION)
Qty: 1 EACH | Refills: 6 | Status: SHIPPED | OUTPATIENT
Start: 2022-05-31 | End: 2022-11-29

## 2022-05-31 NOTE — PROGRESS NOTES
Primary Care Provider  Lolita Smith APRN     Referring Provider  No ref. provider found     Chief Complaint  Lung nodle, Shortness of Breath, and Follow-up    Subjective          Jana I Schoenbaechler presents to [facility]MON[facility]  History of Present Illness  Jana I Schoenbaechler is a 66 y.o. female patient with history of chronic heavy smoking in past with cough, dyspnea, lung nodule, history of cardiac arrhythmia status post pacemaker placement and diastolic heart failure, tobacco abuse of cigarettes in remission and upper respiratory airway resistance syndrome.  She is here for follow-up.  She had repeat CT scan of the chest on May 9, 2022.  I reviewed the results with her today.  Shows a stable 5 mm lung nodule.  Shows mild emphysema and upper lobes.  PFT done after last office visit shows normal lung function.  She is currently not taking any inhalers.  She used to be a heavy smoker, smoking 1 and half pack a day for many years, changed to vaping in 2017.  She vapes for several years.  She had to have pacemaker placed in October 2021.  Since then she quit vaping altogether.  Overall her pack-year smoking was more than 50 pack years.  She does have shortness of breath with activities.  However has not tried any inhalers in the past.  She has no changes in weight or appetite.  No fever or chills.  No nausea or vomiting.      Review of Systems     General:  No Fatigue, No Fever No weight loss or loss of appetite  HEENT: No dysphagia, No Visual Changes, no rhinorrhea  Respiratory:  + cough,+Dyspnea, intermittent phlegm, No Pleuritic Pain, no wheezing, no hemoptysis.  Cardiovascular: Denies chest pain, denies palpitations,+ROSE, No Chest Pressure  Gastrointestinal:  No Abdominal Pain, No Nausea, No Vomiting, No Diarrhea  Genitourinary:  No Dysuria, No Frequency, No Hesitancy  Musculoskeletal: No muscle pain or swelling  Endocrine:  No Heat Intolerance, No Cold  Intolerance  Hematologic:  No Bleeding, No Bruising  Psychiatric:  No Anxiety, No Depression  Neurologic:  No Confusion, no Dysarthria, No Headaches  Skin:  No Rash, No Open Wounds    History reviewed. No pertinent family history.     Social History     Socioeconomic History   • Marital status:    Tobacco Use   • Smoking status: Former Smoker     Years: 40.00     Types: Cigarettes     Quit date: 2017     Years since quittin.1   • Smokeless tobacco: Never Used   Vaping Use   • Vaping Use: Former   • Substances: Nicotine   • Devices: [employer] tank   Substance and Sexual Activity   • Alcohol use: Not Currently   • Drug use: Never   • Sexual activity: Not Currently        Past Medical History:   Diagnosis Date   • Arthritis    • CHB s/p cardiac pacemaker 10/25/2021   • Chronic HFrEF (heart failure with reduced ejection fraction)  10/25/2021    10/25/21 echo: Estimated right ventricular systolic pressure from tricuspid regurgitation is markedly elevated (>55 mmHg). Mild LVH. Estimated left ventricular EF = 45% Left ventricular systolic function is mildly decreased.     • GERD (gastroesophageal reflux disease)    • Glaucoma     LEFT EYE   • Hypertension    • Osteoporosis         Immunization History   Administered Date(s) Administered   • COVID-19 (MODERNA) 1st, 2nd, 3rd Dose Only 2021, 2021, 2021   • Fluzone High Dose =>65 Years ([employer] ONLY) 10/08/2020   • Fluzone High-Dose 65+yrs 2022   • INFLUENZA SPLIT TRI 2020         No Known Allergies       Current Outpatient Medications:   •  alendronate (FOSAMAX) 70 MG tablet, Take 1 tablet by mouth Every 7 (Seven) Days. Pt takes on , Disp: , Rfl:   •  brimonidine (ALPHAGAN) 0.2 % ophthalmic solution, Administer 1 drop to both eyes 2 (two) times a day., Disp: , Rfl:   •  calcium carbonate (TUMS) 500 MG chewable tablet, Chew 1 tablet Daily., Disp: , Rfl:   •  cholecalciferol (VITAMIN D3) 25 MCG (1000 UT) tablet, Take 1.25  "Units by mouth Take As Directed. Every 5 days, Disp: , Rfl:   •  dorzolamide-timolol (COSOPT) 22.3-6.8 MG/ML ophthalmic solution, Administer 1 drop to both eyes 2 (Two) Times a Day., Disp: , Rfl:   •  ergocalciferol (ERGOCALCIFEROL) 1.25 MG (19170 UT) capsule, Take 50,000 Units by mouth Take As Directed. Pt takes every 5 days, Disp: , Rfl:   •  hyoscyamine (ANASPAZ,LEVSIN) 0.125 MG tablet, Take 0.125 mg by mouth Every 4 (Four) Hours As Needed for Cramping., Disp: , Rfl:   •  latanoprost (XALATAN) 0.005 % ophthalmic solution, Administer 1 drop to both eyes Every Night., Disp: , Rfl:   •  lisinopril (PRINIVIL,ZESTRIL) 10 MG tablet, Take 1 tablet by mouth Daily., Disp: 90 tablet, Rfl: 3  •  Netarsudil Dimesylate (Rhopressa) 0.02 % solution, Apply 1 drop to eye(s) as directed by provider Daily., Disp: , Rfl:   •  tiotropium bromide-olodaterol (Stiolto Respimat) 2.5-2.5 MCG/ACT aerosol solution inhaler, Inhale 2 puffs Daily., Disp: 1 each, Rfl: 6     Objective   Vital Signs:   /69 (BP Location: Left arm, Patient Position: Sitting, Cuff Size: Adult)   Pulse 79   Temp 98.2 °F (36.8 °C) (Tympanic)   Resp 18   Ht 160 cm (63\")   Wt 93 kg (205 lb 1.6 oz)   SpO2 94% Comment: room air  BMI 36.33 kg/m²     Mallampatti classification : 1  Physical Exam  Vital Signs Reviewed  Pleasant obese female, in no acute distress, normal conversant  HEENT:  PERRL, EOMI.  OP, nares clear, no sinus tenderness  Neck:  Supple, no JVD, no thyromegaly  Lymph: no axillary, cervical, supraclavicular lymphadenopathy noted bilaterally  Chest:  good aeration, bilateral diminished breath sounds, no wheezing, crackles or rhonchi, resonant to percussion b/l  CV: RRR, no MGR, pulses 2+, equal.  Abd:  Soft, NT, ND, + BS, no HSM  EXT:  no clubbing, no cyanosis, No BLE edema  Neuro:  A&Ox3, CN grossly intact, no focal deficits  Skin: No rashes or lesions noted     Result Review :   The following data was reviewed by: Junior Pham MD on " 05/31/2022:  Common labs    Common Labsle 10/27/21 10/27/21 3/1/22 3/1/22 5/23/22    0442 [id number] 0815 0815    Glucose  113 (A)   81   BUN  21   24 (A)   Creatinine  1.06 (A) 0.94  0.81   eGFR Non African Am  52 (A)      Sodium  135 (A)   138   Potassium  5.0   4.6   Chloride  103   104   Calcium  8.8  9.3 9.2   WBC 5.65       Hemoglobin 14.0       Hematocrit 42.1       Platelets 276       (A) Abnormal value            CMP    CMP 10/27/21 3/1/22 3/1/22 5/23/22     0815 0815    Glucose 113 (A)   81   BUN 21   24 (A)   Creatinine 1.06 (A) 0.94  0.81   eGFR Non African Am 52 (A)      Sodium 135 (A)   138   Potassium 5.0   4.6   Chloride 103   104   Calcium 8.8  9.3 9.2   (A) Abnormal value            CBC    CBC 10/25/21 10/26/21 10/27/21   WBC 6.55 6.51 5.65   RBC 5.07 4.61 4.80   Hemoglobin 14.7 13.6 14.0   Hematocrit 44.2 41.5 42.1   MCV 87.2 90.0 87.7   MCH 29.0 29.5 29.2   MCHC 33.3 32.8 33.3   RDW 12.8 12.9 12.8   Platelets 320 276 276           CBC w/diff    CBC w/Diff 10/25/21 10/26/21 10/27/21   WBC 6.55 6.51 5.65   RBC 5.07 4.61 4.80   Hemoglobin 14.7 13.6 14.0   Hematocrit 44.2 41.5 42.1   MCV 87.2 90.0 87.7   MCH 29.0 29.5 29.2   MCHC 33.3 32.8 33.3   RDW 12.8 12.9 12.8   Platelets 320 276 276   Neutrophil Rel % 57.1  67.2   Immature Granulocyte Rel % 0.2  0.2   Lymphocyte Rel % 29.3  17.9 (A)   Monocyte Rel % 9.5  12.4 (A)   Eosinophil Rel % 3.1  1.6   Basophil Rel % 0.8  0.7   (A) Abnormal value            Data reviewed: Radiologic studies CT scan of the chest from early May 2022 was reviewed by me.  Shows stable 5 mm lung nodule.            Assessment and Plan    Diagnoses and all orders for this visit:    1. Lung nodule (Primary)  -     tiotropium bromide-olodaterol (Stiolto Respimat) 2.5-2.5 MCG/ACT aerosol solution inhaler; Inhale 2 puffs Daily.  Dispense: 1 each; Refill: 6    2. Cough  -     tiotropium bromide-olodaterol (Stiolto Respimat) 2.5-2.5 MCG/ACT aerosol solution inhaler; Inhale 2 puffs Daily.   Dispense: 1 each; Refill: 6    3. Snoring  -     tiotropium bromide-olodaterol (Stiolto Respimat) 2.5-2.5 MCG/ACT aerosol solution inhaler; Inhale 2 puffs Daily.  Dispense: 1 each; Refill: 6    4. Upper airway resistance syndrome  -     tiotropium bromide-olodaterol (Stiolto Respimat) 2.5-2.5 MCG/ACT aerosol solution inhaler; Inhale 2 puffs Daily.  Dispense: 1 each; Refill: 6    5. Dyspnea, unspecified type  -     tiotropium bromide-olodaterol (Stiolto Respimat) 2.5-2.5 MCG/ACT aerosol solution inhaler; Inhale 2 puffs Daily.  Dispense: 1 each; Refill: 6    6. Tobacco abuse, in remission  -     tiotropium bromide-olodaterol (Stiolto Respimat) 2.5-2.5 MCG/ACT aerosol solution inhaler; Inhale 2 puffs Daily.  Dispense: 1 each; Refill: 6    7. Chronic HFrEF (heart failure with reduced ejection fraction)     8. Hiatal hernia      Lung nodule: 5 mm lung nodule is stable, repeat CT scan of the chest from May 2022 shows a stable finding from October 2021.  Repeat CT scan of the chest in a year from now.  She will need yearly low-dose lung cancer screening CT scan from now on.    COPD with exertional dyspnea: Likely has mild obstructive airway disease.  I have given her Stiolto sample to use.  I have also sent the prescription to pharmacy.  Advised her to use albuterol as needed.    Upper airway resting syndrome: Currently not on any treatment.  She uses saline rinse at times.    She is up-to-date on flu, pneumonia and COVID-19 vaccine.    All the tests including CT scan of the chest and PFTs were reviewed with her and her  today.    Follow Up   Return in about 6 months (around 11/30/2022).  Patient was given instructions and counseling regarding her condition or for health maintenance advice. Please see specific information pulled into the AVS if appropriate.       Electronically signed by Junior Pham MD, 5/31/2022, 16:46 EDT.

## 2022-06-01 RX ORDER — TIOTROPIUM BROMIDE AND OLODATEROL 3.124; 2.736 UG/1; UG/1
2 SPRAY, METERED RESPIRATORY (INHALATION)
Qty: 2 EACH | Refills: 0 | COMMUNITY
Start: 2022-06-01 | End: 2022-11-29

## 2022-07-19 ENCOUNTER — TRANSCRIBE ORDERS (OUTPATIENT)
Dept: ADMINISTRATIVE | Facility: HOSPITAL | Age: 67
End: 2022-07-19

## 2022-07-19 DIAGNOSIS — Z12.31 VISIT FOR SCREENING MAMMOGRAM: Primary | ICD-10-CM

## 2022-08-03 ENCOUNTER — APPOINTMENT (OUTPATIENT)
Dept: MAMMOGRAPHY | Facility: HOSPITAL | Age: 67
End: 2022-08-03

## 2022-09-01 ENCOUNTER — LAB (OUTPATIENT)
Dept: LAB | Facility: HOSPITAL | Age: 67
End: 2022-09-01

## 2022-09-01 ENCOUNTER — TRANSCRIBE ORDERS (OUTPATIENT)
Dept: LAB | Facility: HOSPITAL | Age: 67
End: 2022-09-01

## 2022-09-01 DIAGNOSIS — M81.0 OSTEOPOROSIS, POST-MENOPAUSAL: Primary | ICD-10-CM

## 2022-09-01 DIAGNOSIS — Z79.899 ENCOUNTER FOR LONG-TERM (CURRENT) USE OF OTHER MEDICATIONS: ICD-10-CM

## 2022-09-01 DIAGNOSIS — E55.9 VITAMIN D DEFICIENCY: ICD-10-CM

## 2022-09-01 DIAGNOSIS — M81.0 OSTEOPOROSIS, POST-MENOPAUSAL: ICD-10-CM

## 2022-09-01 LAB
25(OH)D3 SERPL-MCNC: 71.3 NG/ML (ref 30–100)
CALCIUM SPEC-SCNC: 10.5 MG/DL (ref 8.6–10.5)
CREAT SERPL-MCNC: 0.88 MG/DL (ref 0.57–1)
EGFRCR SERPLBLD CKD-EPI 2021: 72.6 ML/MIN/1.73

## 2022-09-01 PROCEDURE — 82565 ASSAY OF CREATININE: CPT

## 2022-09-01 PROCEDURE — 82306 VITAMIN D 25 HYDROXY: CPT

## 2022-09-01 PROCEDURE — 82310 ASSAY OF CALCIUM: CPT

## 2022-09-01 PROCEDURE — 36415 COLL VENOUS BLD VENIPUNCTURE: CPT

## 2022-09-02 DIAGNOSIS — K22.719 BARRETT'S ESOPHAGUS WITH DYSPLASIA: ICD-10-CM

## 2022-09-02 RX ORDER — PANTOPRAZOLE SODIUM 40 MG/1
TABLET, DELAYED RELEASE ORAL
Qty: 30 TABLET | Refills: 0 | Status: SHIPPED | OUTPATIENT
Start: 2022-09-02 | End: 2022-09-29

## 2022-09-20 ENCOUNTER — HOSPITAL ENCOUNTER (OUTPATIENT)
Dept: MAMMOGRAPHY | Facility: HOSPITAL | Age: 67
Discharge: HOME | End: 2022-09-20
Admitting: NURSE PRACTITIONER

## 2022-09-20 DIAGNOSIS — Z12.31 VISIT FOR SCREENING MAMMOGRAM: ICD-10-CM

## 2022-09-20 PROCEDURE — 77063 BREAST TOMOSYNTHESIS BI: CPT

## 2022-09-20 PROCEDURE — 77067 SCR MAMMO BI INCL CAD: CPT

## 2022-09-29 DIAGNOSIS — K22.719 BARRETT'S ESOPHAGUS WITH DYSPLASIA: ICD-10-CM

## 2022-09-29 RX ORDER — PANTOPRAZOLE SODIUM 40 MG/1
TABLET, DELAYED RELEASE ORAL
Qty: 30 TABLET | Refills: 4 | Status: SHIPPED | OUTPATIENT
Start: 2022-09-29 | End: 2022-12-13 | Stop reason: SDUPTHER

## 2022-11-14 NOTE — PROGRESS NOTES
Primary Care Provider  Lolita Smith APRN     Referring Provider  No ref. provider found     Chief Complaint  Shortness of Breath and Follow-up (6 month follow up. )    Subjective          History of Presenting Illness  Patient is a 66-year-old female, patient of Dr. Mcintyre who presents for management of dyspnea and lung nodule who presents for a follow-up visit today.  Patient's  is present with the patient in the office today.  Patient states that since last visit her breathing is doing well.  Patient states that since having her pacemaker placed in October 2021 her breathing has significantly improved.  Patient states that she is not taking Stiolto she did not notice a difference with taking the inhaler and it was too expensive.  Patient is not wanting a daily inhaler at this time.  Patient states that she does have some allergy symptoms such as runny nose and nasal congestion and would like something for allergies.  Patient states that she has an albuterol inhaler to use as needed, however has not had to use it. Patient denies fever, chills, night sweats, swollen glands in the head and neck, unintentional weight loss, hemoptysis, purulent sputum production, dysphagia, chest pain, palpitations, chest tightness, abdominal pain, nausea, vomiting, and diarrhea.  Patient also denies any myalgias, changes in sense of taste and/or smell, sore throat, any other coronavirus or flu-like symptoms.  Patient denies any leg swelling, orthopnea, paroxysmal nocturnal dyspnea.  Patient is able to perform activities of daily living.        Review of Systems   Constitutional: Negative for activity change, appetite change, chills, diaphoresis, fatigue, fever, unexpected weight gain and unexpected weight loss.        Negative for Insomnia   HENT: Positive for congestion (Nasal) and rhinorrhea. Negative for mouth sores, nosebleeds, postnasal drip, sore throat, swollen glands and trouble swallowing.         Negative for  Thrush  Negative for Hoarseness  Negative for Allergies/Hay Fever  Negative for Recent Head injury  Negative for Ear Fullness  Negative for Nasal or Sinus pain  Negative for Dry lips  Negative for Nasal discharge   Respiratory: Positive for shortness of breath (improved since having pacemaker placed). Negative for apnea, cough, chest tightness and wheezing.         Negative for Hemoptysis  Negative for Pleuritic pain   Cardiovascular: Negative for chest pain, palpitations and leg swelling.        Negative for Claudication  Negative for Cyanosis  Negative for Dyspnea on exertion   Gastrointestinal: Negative for abdominal pain, diarrhea, nausea, vomiting and GERD.   Musculoskeletal: Negative for joint swelling and myalgias.        Negative for Joint pain  Negative for Joint stiffness   Skin: Negative for color change, dry skin, pallor and rash.   Neurological: Negative for syncope, weakness and headache.   Hematological: Negative for adenopathy. Does not bruise/bleed easily.        History reviewed. No pertinent family history.     Social History     Socioeconomic History   • Marital status:    Tobacco Use   • Smoking status: Former     Packs/day: 2.00     Years: 40.00     Pack years: 80.00     Types: Cigarettes     Quit date: 2017     Years since quittin.6   • Smokeless tobacco: Never   Vaping Use   • Vaping Use: Former   • Substances: Nicotine   • Devices: Refillable tank   Substance and Sexual Activity   • Alcohol use: Not Currently   • Drug use: Never   • Sexual activity: Not Currently        Past Medical History:   Diagnosis Date   • Arthritis    • CHB s/p cardiac pacemaker 10/25/2021   • Chronic HFrEF (heart failure with reduced ejection fraction)  10/25/2021    10/25/21 echo: Estimated right ventricular systolic pressure from tricuspid regurgitation is markedly elevated (>55 mmHg). Mild LVH. Estimated left ventricular EF = 45% Left ventricular systolic function is mildly decreased.     • GERD  (gastroesophageal reflux disease)    • Glaucoma     LEFT EYE   • Hypertension    • Osteoporosis         Immunization History   Administered Date(s) Administered   • COVID-19 (MODERNA) 1st, 2nd, 3rd Dose Only 05/03/2021, 06/03/2021, 12/17/2021   • Fluzone High Dose =>65 Years ([employer] ONLY) 10/08/2020   • Fluzone High-Dose 65+yrs 02/09/2022   • INFLUENZA SPLIT TRI 12/07/2020       No Known Allergies       Current Outpatient Medications:   •  albuterol sulfate  (90 Base) MCG/ACT inhaler, Inhale 2 puffs Every 4 (Four) Hours As Needed., Disp: , Rfl:   •  alendronate (FOSAMAX) 70 MG tablet, Take 1 tablet by mouth Every 7 (Seven) Days. Pt takes on Wednesdays, Disp: , Rfl:   •  brimonidine (ALPHAGAN) 0.2 % ophthalmic solution, Administer 1 drop to both eyes 2 (two) times a day., Disp: , Rfl:   •  dorzolamide-timolol (COSOPT) 22.3-6.8 MG/ML ophthalmic solution, Administer 1 drop to both eyes 2 (Two) Times a Day., Disp: , Rfl:   •  ergocalciferol (ERGOCALCIFEROL) 1.25 MG (95974 UT) capsule, Take 50,000 Units by mouth Take As Directed. Pt takes every 5 days, Disp: , Rfl:   •  latanoprost (XALATAN) 0.005 % ophthalmic solution, Administer 1 drop to both eyes Every Night., Disp: , Rfl:   •  lisinopril (PRINIVIL,ZESTRIL) 10 MG tablet, Take 1 tablet by mouth Daily., Disp: 90 tablet, Rfl: 3  •  Netarsudil Dimesylate (Rhopressa) 0.02 % solution, Apply 1 drop to eye(s) as directed by provider Daily., Disp: , Rfl:   •  pantoprazole (PROTONIX) 40 MG EC tablet, Take 1 tablet by mouth once daily, Disp: 30 tablet, Rfl: 4  •  calcium carbonate (TUMS) 500 MG chewable tablet, Chew 1 tablet Daily., Disp: , Rfl:   •  cholecalciferol (VITAMIN D3) 25 MCG (1000 UT) tablet, Take 1.25 Units by mouth Take As Directed. Every 5 days, Disp: , Rfl:   •  fluticasone (Flonase) 50 MCG/ACT nasal spray, 2 sprays into the nostril(s) as directed by provider Daily for 30 days., Disp: 16 g, Rfl: 5  •  hyoscyamine (ANASPAZ,LEVSIN) 0.125 MG tablet, Take  "0.125 mg by mouth Every 4 (Four) Hours As Needed for Cramping., Disp: , Rfl:      Objective     Physical Exam  Vital Signs:   WDWN, Alert, NAD.    HEENT:  PERRL, EOMI.  OP, nares with clear drainage, mildly swollen turbinates bilaterally, no sinus tenderness  Neck:  Supple, no JVD, no thyromegaly.  Lymph: no axillary, cervical, supraclavicular lymphadenopathy noted bilaterally  Chest:  good aeration, clear to auscultation bilaterally, tympanic to percussion bilaterally, no work of breathing noted  CV: RRR, no MGR, pulses 2+, equal.  Abd:  Soft, NT, ND, + BS, no HSM  EXT:  no clubbing, no cyanosis, no edema, no joint tenderness  Neuro:  A&Ox3, CN grossly intact, no focal deficits.  Skin: No rashes or lesions noted.    /74 (BP Location: Right arm, Patient Position: Sitting, Cuff Size: Adult)   Pulse 76   Temp 97.3 °F (36.3 °C) (Tympanic)   Resp 14   Ht 160 cm (62.99\")   Wt 90.1 kg (198 lb 9.6 oz)   SpO2 99% Comment: room air  BMI 35.19 kg/m²         Result Review :   I have reviewed Dr. Pham's last office visit note.      Procedures:         Assessment and Plan      Assessment:  1.  Lung nodule.  Recent chest CT scan report showed decrease in size of right upper lobe pulmonary nodule now measuring 5 mm.   2.  Cough, resolved per pt report.  3.  Dyspnea, improved per pt report.  4.  Snoring/Upper airway resistance.  5.  Cardiac arrhythmia status post pacemaker placement.  6.  Seasonal allergies.   7.  Tobacco abuse of cigarettes in remission.  8.   Encounter for immunization: flu vaccine given to the patient in the office today.      Plan:  1.  Patient is not wanting a daily inhaler at this time.   2.  Continue albuterol inhaler as needed.    3.  Will repeat chest CT scan.  Order placed today.  4.  For seasonal allergies, patient states that she will buy an antihistamine over-the-counter.  Will start patient on Flonase.  How to take medications and possible side effects of medications discussed with the " patient.  Patient verbalized understanding and compliance.  5.  Follow-up with cardiology as scheduled.  6.  Smoking status: former cigarette smoker.   7.  Vaccination status: Flu vaccine given to patient in the office today.  Patient reports they are up-to-date with Covid vaccines.  We will give patient Prevnar 20 vaccine next office visit as patient does not want to receive two vaccines at the same time.  Patient is advised to continue to follow CDC recommendations such as social distancing wearing a mask and washing hands for at least 20 seconds.  8.  Patient to call the office, 911, or go to the ER with new or worsening symptoms.  9.  For snoring /upper airway resistance, patient is advised to lose weight. Patient is advised to sleep on the side, patient can use body pillow to prevent rollover.   Sleep hygiene, with the regular sleep time and wake time.   Advised to avoid alcohol and other sedative medications.   Patient can also use oral appliance for snoring.  10.  Follow-up in 6 months, sooner if needed.          Follow Up   Return in about 6 months (around 5/29/2023).  Patient was given instructions and counseling regarding her condition or for health maintenance advice. Please see specific information pulled into the AVS if appropriate.

## 2022-11-29 ENCOUNTER — OFFICE VISIT (OUTPATIENT)
Dept: PULMONOLOGY | Facility: CLINIC | Age: 67
End: 2022-11-29

## 2022-11-29 VITALS
HEART RATE: 76 BPM | HEIGHT: 63 IN | OXYGEN SATURATION: 99 % | RESPIRATION RATE: 14 BRPM | TEMPERATURE: 97.3 F | SYSTOLIC BLOOD PRESSURE: 126 MMHG | BODY MASS INDEX: 35.19 KG/M2 | WEIGHT: 198.6 LBS | DIASTOLIC BLOOD PRESSURE: 74 MMHG

## 2022-11-29 DIAGNOSIS — J30.2 SEASONAL ALLERGIES: ICD-10-CM

## 2022-11-29 DIAGNOSIS — R06.83 SNORING: ICD-10-CM

## 2022-11-29 DIAGNOSIS — R06.00 DYSPNEA, UNSPECIFIED TYPE: ICD-10-CM

## 2022-11-29 DIAGNOSIS — Z23 FLU VACCINE NEED: ICD-10-CM

## 2022-11-29 DIAGNOSIS — F17.201 TOBACCO ABUSE, IN REMISSION: Primary | ICD-10-CM

## 2022-11-29 DIAGNOSIS — R91.1 LUNG NODULE: ICD-10-CM

## 2022-11-29 DIAGNOSIS — Z23 ENCOUNTER FOR IMMUNIZATION: ICD-10-CM

## 2022-11-29 DIAGNOSIS — G47.8 UPPER AIRWAY RESISTANCE SYNDROME: ICD-10-CM

## 2022-11-29 PROCEDURE — G0008 ADMIN INFLUENZA VIRUS VAC: HCPCS | Performed by: NURSE PRACTITIONER

## 2022-11-29 PROCEDURE — 99214 OFFICE O/P EST MOD 30 MIN: CPT | Performed by: NURSE PRACTITIONER

## 2022-11-29 PROCEDURE — 90662 IIV NO PRSV INCREASED AG IM: CPT | Performed by: NURSE PRACTITIONER

## 2022-11-29 RX ORDER — ALBUTEROL SULFATE 90 UG/1
2 AEROSOL, METERED RESPIRATORY (INHALATION) EVERY 4 HOURS PRN
COMMUNITY

## 2022-11-29 RX ORDER — FLUTICASONE PROPIONATE 50 MCG
2 SPRAY, SUSPENSION (ML) NASAL DAILY
Qty: 16 G | Refills: 5 | Status: SHIPPED | OUTPATIENT
Start: 2022-11-29 | End: 2022-12-29

## 2022-11-30 ENCOUNTER — OFFICE VISIT (OUTPATIENT)
Dept: CARDIOLOGY | Facility: CLINIC | Age: 67
End: 2022-11-30

## 2022-11-30 VITALS
HEART RATE: 78 BPM | SYSTOLIC BLOOD PRESSURE: 132 MMHG | DIASTOLIC BLOOD PRESSURE: 72 MMHG | WEIGHT: 195.4 LBS | HEIGHT: 63 IN | BODY MASS INDEX: 34.62 KG/M2

## 2022-11-30 DIAGNOSIS — I10 ESSENTIAL HYPERTENSION: ICD-10-CM

## 2022-11-30 DIAGNOSIS — I50.22 CHRONIC HFREF (HEART FAILURE WITH REDUCED EJECTION FRACTION): ICD-10-CM

## 2022-11-30 DIAGNOSIS — Z95.0 PRESENCE OF CARDIAC PACEMAKER: Primary | ICD-10-CM

## 2022-11-30 PROCEDURE — 99024 POSTOP FOLLOW-UP VISIT: CPT | Performed by: INTERNAL MEDICINE

## 2022-11-30 NOTE — ASSESSMENT & PLAN NOTE
Patient with stable symptoms and weight blood pressure goal range continue on lisinopril 10 mg once a day encourage patient to keep daily weight log

## 2022-12-13 ENCOUNTER — OFFICE VISIT (OUTPATIENT)
Dept: GASTROENTEROLOGY | Facility: CLINIC | Age: 67
End: 2022-12-13

## 2022-12-13 VITALS
OXYGEN SATURATION: 100 % | HEIGHT: 63 IN | WEIGHT: 197 LBS | DIASTOLIC BLOOD PRESSURE: 76 MMHG | SYSTOLIC BLOOD PRESSURE: 159 MMHG | HEART RATE: 63 BPM | BODY MASS INDEX: 34.91 KG/M2

## 2022-12-13 DIAGNOSIS — K58.0 IRRITABLE BOWEL SYNDROME WITH DIARRHEA: ICD-10-CM

## 2022-12-13 DIAGNOSIS — K44.9 HIATAL HERNIA: ICD-10-CM

## 2022-12-13 DIAGNOSIS — K22.719 BARRETT'S ESOPHAGUS WITH DYSPLASIA: ICD-10-CM

## 2022-12-13 DIAGNOSIS — K22.70 BARRETT'S ESOPHAGUS WITHOUT DYSPLASIA: Primary | ICD-10-CM

## 2022-12-13 PROCEDURE — 99214 OFFICE O/P EST MOD 30 MIN: CPT | Performed by: INTERNAL MEDICINE

## 2022-12-13 RX ORDER — PANTOPRAZOLE SODIUM 40 MG/1
40 TABLET, DELAYED RELEASE ORAL DAILY
Qty: 30 TABLET | Refills: 4 | Status: SHIPPED | OUTPATIENT
Start: 2022-12-13

## 2022-12-13 RX ORDER — HYOSCYAMINE SULFATE 0.125 MG
0.12 TABLET ORAL EVERY 4 HOURS PRN
Qty: 60 TABLET | Refills: 1 | Status: SHIPPED | OUTPATIENT
Start: 2022-12-13 | End: 2023-01-12

## 2022-12-13 NOTE — PROGRESS NOTES
Chief Complaint    Jana I Schoenbaechler is a 67 y.o. female who presents to [facility] for follow-up of Chowdary's esophagus, esophageal reflux and irritable bowel syndrome.  Over the last month patient has developed worsening diarrhea.  She was treated with ciprofloxacin initially and it seemed to help but as soon as she stopped the medication her diarrhea worsened.  She describes 2-4 5 loose stools per day.  She does not have typical abdominal pain or cramping.  She denies rectal bleeding or weight loss.  She denies nausea or vomiting.  She takes Protonix daily with good control of her reflux.  Her last EGD for Chowdary's esophagus was March 2021.  Her last colonoscopy was June 2019.  She has had a prior cholecystectomy.  She did have a recent pacemaker placement with Dr. Fiore.    Result Review :     The following data was reviewed by: Vito Maurer MD on 12/13/2022:    CMP    CMP 3/1/22 3/1/22 5/23/22 9/1/22 9/1/22    0815 0815  0947 0947   Glucose   81     BUN   24 (A)     Creatinine 0.94  0.81  0.88   Sodium   138     Potassium   4.6     Chloride   104     Calcium  9.3 9.2 10.5    (A) Abnormal value                  Data reviewed: GI studies Reviewed     Past Medical History:   Diagnosis Date   • Arthritis    • CHB s/p cardiac pacemaker 10/25/2021   • Chronic HFrEF (heart failure with reduced ejection fraction)  10/25/2021    10/25/21 echo: Estimated right ventricular systolic pressure from tricuspid regurgitation is markedly elevated (>55 mmHg). Mild LVH. Estimated left ventricular EF = 45% Left ventricular systolic function is mildly decreased.     • GERD (gastroesophageal reflux disease)    • Glaucoma     LEFT EYE   • Hypertension    • Osteoporosis        Past Surgical History:   Procedure Laterality Date   • CARDIAC CATHETERIZATION N/A 10/26/2021    Procedure: Left Heart Cath;  Surgeon: Calixto Fiore MD;  Location: [facility] INVAS[facility] LOCATION;  Service:  "Cardiovascular;  Laterality: N/A;   • CARDIAC ELECTROPHYSIOLOGY PROCEDURE N/A 10/26/2021    Procedure: Pacemaker SC new;  Surgeon: Calixto Fiore MD;  Location: [facility] INVASIVE LOCATION;  Service: Cardiovascular;  Laterality: N/A;   • CHOLECYSTECTOMY     • INSERT / REPLACE / REMOVE PACEMAKER     • KNEE SURGERY Right        Social History     Social History Narrative   • Not on file       Objective     Vital Signs:   /76 (BP Location: Right arm, Patient Position: Sitting, Cuff Size: Adult)   Pulse 63   Ht 160 cm (63\")   Wt 89.4 kg (197 lb)   SpO2 100%   BMI 34.90 kg/m²     Body mass index is 34.9 kg/m².    Physical Exam            Assessment and Plan    Diagnoses and all orders for this visit:    1. Chowdary's esophagus without dysplasia (Primary)    2. Hiatal hernia    3. Irritable bowel syndrome with diarrhea    I will have the patient submit stool samples for C. difficile, culture, ova and parasites and fecal leukocytes.  She will then trial Imodium 2 pills daily and up to 6 pills/day.  If this does not stop her diarrhea she will call and be scheduled for colonoscopy.  Otherwise she will follow-up with me in 3 months.  She will continue her Protonix and I have refilled this for her Chowdary's esophagus.  Repeat upper endoscopy recommended for 2024.              Follow Up   No follow-ups on file.  Patient was given instructions and counseling regarding her condition or for health maintenance advice. Please see specific information pulled into the AVS if appropriate.     "

## 2022-12-14 ENCOUNTER — LAB (OUTPATIENT)
Dept: LAB | Facility: HOSPITAL | Age: 67
End: 2022-12-14

## 2022-12-14 DIAGNOSIS — R19.7 DIARRHEA, UNSPECIFIED TYPE: Primary | ICD-10-CM

## 2022-12-14 DIAGNOSIS — K58.0 IRRITABLE BOWEL SYNDROME WITH DIARRHEA: ICD-10-CM

## 2022-12-14 LAB
027 TOXIN: NORMAL
C DIFF TOX GENS STL QL NAA+PROBE: NEGATIVE
LACTOFERRIN STL QL LA: POSITIVE

## 2022-12-14 PROCEDURE — 87493 C DIFF AMPLIFIED PROBE: CPT

## 2022-12-14 PROCEDURE — 87506 IADNA-DNA/RNA PROBE TQ 6-11: CPT

## 2022-12-14 PROCEDURE — 83630 LACTOFERRIN FECAL (QUAL): CPT

## 2022-12-15 ENCOUNTER — TELEPHONE (OUTPATIENT)
Dept: GASTROENTEROLOGY | Facility: CLINIC | Age: 67
End: 2022-12-15

## 2022-12-15 LAB
C COLI+JEJ+UPSA DNA STL QL NAA+NON-PROBE: NOT DETECTED
CRYPTOSP DNA STL QL NAA+NON-PROBE: NOT DETECTED
E HISTOLYT DNA STL QL NAA+NON-PROBE: NOT DETECTED
EC STX1+STX2 GENES STL QL NAA+NON-PROBE: NOT DETECTED
G LAMBLIA DNA STL QL NAA+NON-PROBE: NOT DETECTED
S ENT+BONG DNA STL QL NAA+NON-PROBE: NOT DETECTED
SHIGELLA SP+EIEC IPAH ST NAA+NON-PROBE: NOT DETECTED

## 2022-12-15 NOTE — TELEPHONE ENCOUNTER
----- Message from KEILA Méndez sent at 12/15/2022 12:54 PM EST -----  Stool study negative for parasites, bacteria and C. difficile.  Positive lactoferrin which can be related to inflammation.  Have patient trial Imodium as discussed with Dr. Maurer if continued diarrhea call to schedule colonoscopy.

## 2022-12-20 ENCOUNTER — HOSPITAL ENCOUNTER (OUTPATIENT)
Dept: CT IMAGING | Facility: HOSPITAL | Age: 67
Discharge: HOME | End: 2022-12-20
Admitting: NURSE PRACTITIONER

## 2022-12-20 DIAGNOSIS — F17.201 TOBACCO ABUSE, IN REMISSION: ICD-10-CM

## 2022-12-20 DIAGNOSIS — R06.83 SNORING: ICD-10-CM

## 2022-12-20 DIAGNOSIS — R91.1 LUNG NODULE: ICD-10-CM

## 2022-12-20 DIAGNOSIS — G47.8 UPPER AIRWAY RESISTANCE SYNDROME: ICD-10-CM

## 2022-12-20 DIAGNOSIS — R06.00 DYSPNEA, UNSPECIFIED TYPE: ICD-10-CM

## 2022-12-20 PROCEDURE — 71250 CT THORAX DX C-: CPT

## 2022-12-21 ENCOUNTER — TELEPHONE (OUTPATIENT)
Dept: PULMONOLOGY | Facility: CLINIC | Age: 67
End: 2022-12-21

## 2022-12-21 DIAGNOSIS — R91.1 LUNG NODULE: Primary | ICD-10-CM

## 2023-02-14 ENCOUNTER — PREP FOR SURGERY (OUTPATIENT)
Dept: OTHER | Facility: HOSPITAL | Age: 68
End: 2023-02-14
Payer: MEDICARE

## 2023-02-14 ENCOUNTER — TELEPHONE (OUTPATIENT)
Dept: GASTROENTEROLOGY | Facility: CLINIC | Age: 68
End: 2023-02-14
Payer: MEDICARE

## 2023-02-14 DIAGNOSIS — K58.0 IRRITABLE BOWEL SYNDROME WITH DIARRHEA: Primary | ICD-10-CM

## 2023-02-14 DIAGNOSIS — R19.7 DIARRHEA, UNSPECIFIED TYPE: ICD-10-CM

## 2023-02-14 RX ORDER — DICYCLOMINE HCL 20 MG
20 TABLET ORAL EVERY 6 HOURS
Qty: 90 TABLET | Refills: 3 | Status: SHIPPED | OUTPATIENT
Start: 2023-02-14

## 2023-02-14 RX ORDER — SODIUM PICOSULFATE, MAGNESIUM OXIDE, AND ANHYDROUS CITRIC ACID 10; 3.5; 12 MG/160ML; G/160ML; G/160ML
160 LIQUID ORAL ONCE
Qty: 320 ML | Refills: 0 | Status: SHIPPED | OUTPATIENT
Start: 2023-02-14 | End: 2023-02-14

## 2023-02-14 NOTE — TELEPHONE ENCOUNTER
"  Problem: General Rehab Plan of Care  Goal: Occupational Therapy Goals  Description: The patient and/or their representative will achieve their patient-specific goals related to the plan of care.  The patient-specific goals include:    Interventions to focus on pt exploring and practicing coping skills to reduce stress in daily life. Encourage feelings identification and expression in healthy ways. Pt will engage in goal directed tasks to enhance concentration, organization, and problem solving. Encourage attendance and participation in scheduled Occupational Therapy sessions. Continue to assess and document progress.     Pt actively participated in a structured occupational therapy group of 3 patients total with a focus on future oriented thinking and goal setting through artistic expression x60 min. Pt worked to complete goal setting worksheet, developing 1 mth, 1 yr, and 5 yr goals. Pt was able to ask for assistance as needed, and independently initiate task. Pt demonstrated good focus, planning, and problem solving. Pt appeared comfortable interacting with peers. Spent remainder of group working on watercolor painting for further facilitation of coping through task. Appeared to enjoy task, stating \"this is so relaxing\". Content affect. Pt provided with initial assessment to complete, with plan to return to therapist tomorrow.     Outcome: No Change     " Patient presented to the office stating her diarrhea is not getting any better. Pt denies any abdominal pain, but states she experiences cramping. Per June, set up for colonoscopy and prescribe Bentyl. Pt aware to  rx from pharmacy. Patient was also instructed on clear liquid diet and Clenpiq bowel prep instructions. Pt was provided with Clenpiq coupon. Pt also aware that if rx is too expensive or not covered, to please contact the office and we can change to a different bowel prep. Pt agreed to this plan.

## 2023-02-20 ENCOUNTER — TRANSCRIBE ORDERS (OUTPATIENT)
Dept: LAB | Facility: HOSPITAL | Age: 68
End: 2023-02-20
Payer: MEDICARE

## 2023-02-20 ENCOUNTER — LAB (OUTPATIENT)
Dept: LAB | Facility: HOSPITAL | Age: 68
End: 2023-02-20
Payer: MEDICARE

## 2023-02-20 ENCOUNTER — HOSPITAL ENCOUNTER (OUTPATIENT)
Dept: CT IMAGING | Facility: HOSPITAL | Age: 68
Discharge: HOME | End: 2023-02-20
Payer: MEDICARE

## 2023-02-20 DIAGNOSIS — M81.0 OSTEOPOROSIS, POST-MENOPAUSAL: ICD-10-CM

## 2023-02-20 DIAGNOSIS — R91.1 LUNG NODULE: ICD-10-CM

## 2023-02-20 DIAGNOSIS — Z79.899 ENCOUNTER FOR LONG-TERM (CURRENT) USE OF OTHER MEDICATIONS: ICD-10-CM

## 2023-02-20 DIAGNOSIS — R91.1 LUNG NODULE: Primary | ICD-10-CM

## 2023-02-20 DIAGNOSIS — Z79.899 ENCOUNTER FOR LONG-TERM (CURRENT) USE OF OTHER MEDICATIONS: Primary | ICD-10-CM

## 2023-02-20 LAB
25(OH)D3 SERPL-MCNC: 63.8 NG/ML (ref 30–100)
CALCIUM SPEC-SCNC: 8.9 MG/DL (ref 8.6–10.5)
CREAT SERPL-MCNC: 0.84 MG/DL (ref 0.57–1)
EGFRCR SERPLBLD CKD-EPI 2021: 76.3 ML/MIN/1.73

## 2023-02-20 PROCEDURE — 36415 COLL VENOUS BLD VENIPUNCTURE: CPT

## 2023-02-20 PROCEDURE — 71250 CT THORAX DX C-: CPT

## 2023-02-20 PROCEDURE — 82306 VITAMIN D 25 HYDROXY: CPT

## 2023-02-20 PROCEDURE — 82565 ASSAY OF CREATININE: CPT

## 2023-02-20 PROCEDURE — 82310 ASSAY OF CALCIUM: CPT

## 2023-02-28 ENCOUNTER — OFFICE VISIT (OUTPATIENT)
Dept: PULMONOLOGY | Facility: CLINIC | Age: 68
End: 2023-02-28
Payer: MEDICARE

## 2023-02-28 VITALS
HEIGHT: 65 IN | RESPIRATION RATE: 16 BRPM | WEIGHT: 197.8 LBS | TEMPERATURE: 97.7 F | OXYGEN SATURATION: 99 % | SYSTOLIC BLOOD PRESSURE: 130 MMHG | HEART RATE: 77 BPM | BODY MASS INDEX: 32.96 KG/M2 | DIASTOLIC BLOOD PRESSURE: 70 MMHG

## 2023-02-28 DIAGNOSIS — R91.1 LUNG NODULE: Primary | ICD-10-CM

## 2023-02-28 PROCEDURE — 99214 OFFICE O/P EST MOD 30 MIN: CPT | Performed by: INTERNAL MEDICINE

## 2023-02-28 RX ORDER — ALENDRONATE SODIUM 70 MG/1
70 TABLET ORAL
COMMUNITY
Start: 2022-09-15

## 2023-02-28 RX ORDER — SODIUM PICOSULFATE, MAGNESIUM OXIDE, AND ANHYDROUS CITRIC ACID 10; 3.5; 12 MG/160ML; G/160ML; G/160ML
LIQUID ORAL
Status: ON HOLD | COMMUNITY
Start: 2023-02-14 | End: 2023-03-16

## 2023-02-28 NOTE — PROGRESS NOTES
Primary Care Provider  Lolita Smith APRN     Referring Provider  No ref. provider found     Chief Complaint  Lung Nodule and Follow-up (2 month follow up/chest ct results)    Subjective          Jana I Schoenbaechler presents to [facility]  History of Present Illness  Jana I Schoenbaechler is a 67 y.o. female patient with history of chronic heavy smoking in past with cough, dyspnea, lung nodule, history of cardiac arrhythmia status post pacemaker placement and diastolic heart failure, tobacco abuse of cigarettes in remission and upper respiratory airway resistance syndrome.  She is here for follow-up.  She had repeat CT scan of the chest last week.  I reviewed the results with her today.  Shows a stable 5 mm lung nodule.  Shows mild emphysema and upper lobes.  PFT done after last office visit shows normal lung function.  She is currently not taking any inhalers.  She used to be a heavy smoker, smoking 1 and half pack a day for many years, changed to vaping in 2017.  She vapes for several years.  She had to have pacemaker placed in October 2021.  Since then she quit vaping altogether.  Overall her pack-year smoking was more than 50 pack years.  She does have shortness of breath with activities.  However has not tried any inhalers in the past.  She has no changes in weight or appetite.  No fever or chills.  No nausea or vomiting.  No chest pain or chest tightness.  Breathing has been at baseline.  Previously, she tried maintenance inhalers without significant improvement with her breathing.      Review of Systems     General:  Fatigue, No Fever No weight loss or loss of appetite  HEENT: No dysphagia, No Visual Changes, no rhinorrhea  Respiratory:  + cough,+Dyspnea, intermittent phlegm, No Pleuritic Pain, no wheezing, no hemoptysis.  Cardiovascular: Denies chest pain, denies palpitations,+ROSE, No Chest Pressure  Gastrointestinal:  No Abdominal Pain, No Nausea,  No Vomiting, No Diarrhea  Genitourinary:  No Dysuria, No Frequency, No Hesitancy  Musculoskeletal: No muscle pain or swelling  Endocrine:  No Heat Intolerance, No Cold Intolerance  Hematologic:  No Bleeding, No Bruising  Psychiatric:  No Anxiety, No Depression  Neurologic:  No Confusion, no Dysarthria, No Headaches  Skin:  No Rash, No Open Wounds    Family History   Problem Relation Age of Onset   • Colon cancer Neg Hx         Social History     Socioeconomic History   • Marital status:    Tobacco Use   • Smoking status: Former     Packs/day: 2.00     Years: 40.00     Pack years: 80.00     Types: Cigarettes     Quit date: 2017     Years since quittin.9     Passive exposure: Never   • Smokeless tobacco: Never   Vaping Use   • Vaping Use: Former   • Substances: Nicotine   • Devices: mEgoble tank   Substance and Sexual Activity   • Alcohol use: Not Currently   • Drug use: Never   • Sexual activity: Not Currently        Past Medical History:   Diagnosis Date   • Arthritis    • CHB s/p cardiac pacemaker 10/25/2021   • Chronic HFrEF (heart failure with reduced ejection fraction)  10/25/2021    10/25/21 echo: Estimated right ventricular systolic pressure from tricuspid regurgitation is markedly elevated (>55 mmHg). Mild LVH. Estimated left ventricular EF = 45% Left ventricular systolic function is mildly decreased.     • GERD (gastroesophageal reflux disease)    • Glaucoma     LEFT EYE   • Hypertension    • Osteoporosis         Immunization History   Administered Date(s) Administered   • COVID-19 (MODERNA) 1st, 2nd, 3rd Dose Only 2021, 2021, 2021   • Fluzone High Dose =>65 Years ([employer] ONLY) 10/08/2020   • Fluzone High-Dose 65+yrs 2022, 2022   • INFLUENZA SPLIT TRI 2020         No Known Allergies       Current Outpatient Medications:   •  alendronate (FOSAMAX) 70 MG tablet, Take 1 tablet by mouth Every 7 (Seven) Days., Disp: , Rfl:   •  brimonidine (ALPHAGAN) 0.2 %  "ophthalmic solution, Administer 1 drop to both eyes 2 (two) times a day., Disp: , Rfl:   •  dorzolamide-timolol (COSOPT) 22.3-6.8 MG/ML ophthalmic solution, Administer 1 drop to both eyes 2 (Two) Times a Day., Disp: , Rfl:   •  ergocalciferol (ERGOCALCIFEROL) 1.25 MG (92367 UT) capsule, Take 1 capsule by mouth Take As Directed. Pt takes every 5 days, Disp: , Rfl:   •  latanoprost (XALATAN) 0.005 % ophthalmic solution, Administer 1 drop to both eyes Every Night., Disp: , Rfl:   •  lisinopril (PRINIVIL,ZESTRIL) 10 MG tablet, Take 1 tablet by mouth Daily., Disp: 90 tablet, Rfl: 3  •  Netarsudil Dimesylate (Rhopressa) 0.02 % solution, Apply 1 drop to eye(s) as directed by provider Daily., Disp: , Rfl:   •  pantoprazole (PROTONIX) 40 MG EC tablet, Take 1 tablet by mouth Daily., Disp: 30 tablet, Rfl: 4  •  albuterol sulfate  (90 Base) MCG/ACT inhaler, Inhale 2 puffs Every 4 (Four) Hours As Needed., Disp: , Rfl:   •  calcium carbonate (TUMS) 500 MG chewable tablet, Chew 1 tablet Daily., Disp: , Rfl:   •  cholecalciferol (VITAMIN D3) 25 MCG (1000 UT) tablet, Take 1.25 Units by mouth Take As Directed. Every 5 days, Disp: , Rfl:   •  Clenpiq 10-3.5-12 MG-GM -GM/160ML solution, , Disp: , Rfl:   •  dicyclomine (BENTYL) 20 MG tablet, Take 1 tablet by mouth Every 6 (Six) Hours., Disp: 90 tablet, Rfl: 3  •  fluticasone (Flonase) 50 MCG/ACT nasal spray, 2 sprays into the nostril(s) as directed by provider Daily for 30 days., Disp: 16 g, Rfl: 5     Objective   Vital Signs:   /70 (BP Location: Left arm, Patient Position: Sitting, Cuff Size: Adult)   Pulse 77   Temp 97.7 °F (36.5 °C) (Tympanic)   Resp 16   Ht 165.1 cm (65\")   Wt 89.7 kg (197 lb 12.8 oz)   SpO2 99% Comment: room air  BMI 32.92 kg/m²     Mallampatti classification : 1  Physical Exam  Vital Signs Reviewed  Pleasant obese female, in no acute distress, normal conversant  HEENT:  PERRL, EOMI.  OP, nares clear, no sinus tenderness  Neck:  Supple, no JVD, " no thyromegaly  Lymph: no axillary, cervical, supraclavicular lymphadenopathy noted bilaterally  Chest:  good aeration, bilateral diminished breath sounds, no wheezing, crackles or rhonchi, resonant to percussion b/l  CV: RRR, no MGR, pulses 2+, equal.  Abd:  Soft, NT, ND, + BS, no HSM  EXT:  no clubbing, no cyanosis, No BLE edema  Neuro:  A&Ox3, CN grossly intact, no focal deficits  Skin: No rashes or lesions noted     Result Review :   The following data was reviewed by: Junior Pham MD on 05/31/2022:  Common labs    Common Labs 5/23/22 9/1/22 9/1/22 2/20/23 2/20/23     0947 0947 0749 0749   Glucose 81       BUN 24 (A)       Creatinine 0.81  0.88  0.84   Sodium 138       Potassium 4.6       Chloride 104       Calcium 9.2 10.5  8.9    (A) Abnormal value            CMP    CMP 5/23/22 9/1/22 9/1/22 2/20/23 2/20/23     0947 0947 0749 0749   Glucose 81       BUN 24 (A)       Creatinine 0.81  0.88  0.84   eGFR 80.2  72.6  76.3   Sodium 138       Potassium 4.6       Chloride 104       Calcium 9.2 10.5  8.9    BUN/Creatinine Ratio 29.6 (A)       Anion Gap 11.3       (A) Abnormal value       Comments are available for some flowsheets but are not being displayed.             CBC w/diff    CBC w/Diff 10/25/21 10/26/21 10/27/21   WBC 6.55 6.51 5.65   RBC 5.07 4.61 4.80   Hemoglobin 14.7 13.6 14.0   Hematocrit 44.2 41.5 42.1   MCV 87.2 90.0 87.7   MCH 29.0 29.5 29.2   MCHC 33.3 32.8 33.3   RDW 12.8 12.9 12.8   Platelets 320 276 276   Neutrophil Rel % 57.1  67.2   Immature Granulocyte Rel % 0.2  0.2   Lymphocyte Rel % 29.3  17.9 (A)   Monocyte Rel % 9.5  12.4 (A)   Eosinophil Rel % 3.1  1.6   Basophil Rel % 0.8  0.7   (A) Abnormal value            Data reviewed: Radiologic studies CT scan of the chest from early May 2022 was reviewed by me.  Shows stable 5 mm lung nodule.            Assessment and Plan    Diagnoses and all orders for this visit:    1. Lung nodule (Primary)  -     CT Chest Without Contrast; Future      Lung  nodule: 5 mm lung nodule is stable, repeat CT scan of the chest from February 2023  shows a stable finding since October 2021.  Repeat CT scan of the chest in October 2023.  She will need yearly low-dose lung cancer screening CT scan from then on if stable    COPD with exertional dyspnea: Likely has mild obstructive airway disease.  Did not have any improvement on maintenance inhaler with Stiolto.  Currently has albuterol as needed but has not been using it.    Upper airway resting syndrome: Currently not on any treatment.  She uses saline rinse at times.    She refuses pneumonia vaccine.    All the tests including CT scan of the chest and PFTs were reviewed with her and her  today.    Follow Up   Return in about 8 months (around 10/28/2023).  After repeat CT scan of the chest.  Patient was given instructions and counseling regarding her condition or for health maintenance advice. Please see specific information pulled into the AVS if appropriate.       Electronically signed by Junior Pham MD, 2/28/2023, 10:30 EST.

## 2023-03-06 ENCOUNTER — TRANSCRIBE ORDERS (OUTPATIENT)
Dept: ADMINISTRATIVE | Facility: HOSPITAL | Age: 68
End: 2023-03-06
Payer: MEDICARE

## 2023-03-06 DIAGNOSIS — M81.0 OSTEOPOROSIS, POST-MENOPAUSAL: Primary | ICD-10-CM

## 2023-03-16 ENCOUNTER — ANESTHESIA (OUTPATIENT)
Dept: GASTROENTEROLOGY | Facility: HOSPITAL | Age: 68
End: 2023-03-16
Payer: MEDICARE

## 2023-03-16 ENCOUNTER — HOSPITAL ENCOUNTER (OUTPATIENT)
Facility: HOSPITAL | Age: 68
Setting detail: HOSPITAL OUTPATIENT SURGERY
Discharge: HOME | End: 2023-03-16
Attending: INTERNAL MEDICINE | Admitting: INTERNAL MEDICINE
Payer: MEDICARE

## 2023-03-16 ENCOUNTER — ANESTHESIA EVENT (OUTPATIENT)
Dept: GASTROENTEROLOGY | Facility: HOSPITAL | Age: 68
End: 2023-03-16
Payer: MEDICARE

## 2023-03-16 VITALS
DIASTOLIC BLOOD PRESSURE: 86 MMHG | HEIGHT: 65 IN | RESPIRATION RATE: 22 BRPM | SYSTOLIC BLOOD PRESSURE: 142 MMHG | OXYGEN SATURATION: 97 % | BODY MASS INDEX: 32.18 KG/M2 | WEIGHT: 193.12 LBS | HEART RATE: 74 BPM | TEMPERATURE: 98 F

## 2023-03-16 DIAGNOSIS — R19.7 DIARRHEA, UNSPECIFIED TYPE: ICD-10-CM

## 2023-03-16 DIAGNOSIS — K58.0 IRRITABLE BOWEL SYNDROME WITH DIARRHEA: ICD-10-CM

## 2023-03-16 PROCEDURE — 45380 COLONOSCOPY AND BIOPSY: CPT | Performed by: INTERNAL MEDICINE

## 2023-03-16 PROCEDURE — 88305 TISSUE EXAM BY PATHOLOGIST: CPT | Performed by: INTERNAL MEDICINE

## 2023-03-16 PROCEDURE — 25010000002 PROPOFOL 10 MG/ML EMULSION: Performed by: NURSE ANESTHETIST, CERTIFIED REGISTERED

## 2023-03-16 RX ORDER — LIDOCAINE HYDROCHLORIDE 20 MG/ML
INJECTION, SOLUTION EPIDURAL; INFILTRATION; INTRACAUDAL; PERINEURAL AS NEEDED
Status: DISCONTINUED | OUTPATIENT
Start: 2023-03-16 | End: 2023-03-16 | Stop reason: SURG

## 2023-03-16 RX ORDER — PROPOFOL 10 MG/ML
VIAL (ML) INTRAVENOUS AS NEEDED
Status: DISCONTINUED | OUTPATIENT
Start: 2023-03-16 | End: 2023-03-16 | Stop reason: SURG

## 2023-03-16 RX ORDER — SODIUM CHLORIDE, SODIUM LACTATE, POTASSIUM CHLORIDE, CALCIUM CHLORIDE 600; 310; 30; 20 MG/100ML; MG/100ML; MG/100ML; MG/100ML
30 INJECTION, SOLUTION INTRAVENOUS CONTINUOUS
Status: DISCONTINUED | OUTPATIENT
Start: 2023-03-16 | End: 2023-03-16 | Stop reason: HOSPADM

## 2023-03-16 RX ADMIN — LIDOCAINE HYDROCHLORIDE 100 MG: 20 INJECTION, SOLUTION EPIDURAL; INFILTRATION; INTRACAUDAL; PERINEURAL at 14:46

## 2023-03-16 RX ADMIN — PROPOFOL 120 MCG/KG/MIN: 10 INJECTION, EMULSION INTRAVENOUS at 14:54

## 2023-03-16 RX ADMIN — PROPOFOL 50 MG: 10 INJECTION, EMULSION INTRAVENOUS at 14:51

## 2023-03-16 RX ADMIN — SODIUM CHLORIDE, POTASSIUM CHLORIDE, SODIUM LACTATE AND CALCIUM CHLORIDE: 600; 310; 30; 20 INJECTION, SOLUTION INTRAVENOUS at 14:42

## 2023-03-16 RX ADMIN — PROPOFOL 100 MG: 10 INJECTION, EMULSION INTRAVENOUS at 14:46

## 2023-03-16 NOTE — ANESTHESIA PREPROCEDURE EVALUATION
Anesthesia Evaluation     Patient summary reviewed and Nursing notes reviewed   no history of anesthetic complications:  NPO Solid Status: > 8 hours  NPO Liquid Status: > 2 hours           Airway   Mallampati: II  TM distance: >3 FB  Neck ROM: full  No difficulty expected and Anterior  Dental      Pulmonary - normal exam    breath sounds clear to auscultation  (+) shortness of breath,   Cardiovascular - normal exam  Exercise tolerance: good (4-7 METS)    Rhythm: regular  Rate: normal    (+) pacemaker pacemaker interrogated 1-3 months ago, hypertension, dysrhythmias Bradycardia,     ROS comment: Pacer good fxn, in for h/o bradycardia    Neuro/Psych- negative ROS  GI/Hepatic/Renal/Endo    (+)  hiatal hernia, GERD,      Musculoskeletal (-) negative ROS    Abdominal    Substance History - negative use     OB/GYN negative ob/gyn ROS         Other - negative ROS       ROS/Med Hx Other: PAT Nursing Notes unavailable.                   Anesthesia Plan    ASA 4     general     (Total IV Anesthesia    Patient understands anesthesia not responsible for dental damage.  )  intravenous induction     Anesthetic plan, risks, benefits, and alternatives have been provided, discussed and informed consent has been obtained with: patient.  Pre-procedure education provided  Plan discussed with CRNA.        CODE STATUS:

## 2023-03-16 NOTE — ANESTHESIA POSTPROCEDURE EVALUATION
Patient: Jana I Schoenbaechler    Procedure Summary     Date: 03/16/23 Room / Location: [facility] ENDOSCOPY 2 / [facility] ENDOSCOPY    Anesthesia Start: 1442 Anesthesia Stop: 1505    Procedure: COLONOSCOPY WITH RANDOM COLON BIOPSIES Diagnosis:       Irritable bowel syndrome with diarrhea      Diarrhea, unspecified type      (Irritable bowel syndrome with diarrhea [K58.0])      (Diarrhea, unspecified type [R19.7])    Surgeons: Vito Maurer MD Provider: Deven Terry MD    Anesthesia Type: general ASA Status: 4          Anesthesia Type: general    Vitals  Vitals Value Taken Time   /72 03/16/23 1519   Temp 36.6 °C (97.8 °F) 03/16/23 1505   Pulse 114 03/16/23 1520   Resp 26 03/16/23 1505   SpO2 100 % 03/16/23 1519   Vitals shown include unvalidated device data.        Post Anesthesia Care and Evaluation    Patient location during evaluation: bedside  Patient participation: complete - patient participated  Level of consciousness: awake  Pain management: adequate    Airway patency: patent  Anesthetic complications: No anesthetic complications  PONV Status: none  Cardiovascular status: acceptable and stable  Respiratory status: acceptable  Hydration status: acceptable    Comments: An Anesthesiologist personally participated in the most demanding procedures (including induction and emergence if applicable) in the anesthesia plan, monitored the course of anesthesia administration at frequent intervals and remained physically present and available for immediate diagnosis and treatment of emergencies.

## 2023-03-16 NOTE — H&P
Pre Procedure History & Physical    Chief Complaint:   diarrhea    Subjective     HPI:   diarrhea    Past Medical History:   Past Medical History:   Diagnosis Date   • Arthritis    • CHB s/p cardiac pacemaker 10/25/2021   • Chronic HFrEF (heart failure with reduced ejection fraction)  10/25/2021    10/25/21 echo: Estimated right ventricular systolic pressure from tricuspid regurgitation is markedly elevated (>55 mmHg). Mild LVH. Estimated left ventricular EF = 45% Left ventricular systolic function is mildly decreased.     • GERD (gastroesophageal reflux disease)    • Glaucoma     LEFT EYE   • Hypertension    • Osteoporosis        Past Surgical History:  Past Surgical History:   Procedure Laterality Date   • CARDIAC CATHETERIZATION N/A 10/26/2021    Procedure: Left Heart Cath;  Surgeon: Calixto Fiore MD;  Location: [facility] CATH INVASIVE LOCATION;  Service: Cardiovascular;  Laterality: N/A;   • CARDIAC ELECTROPHYSIOLOGY PROCEDURE N/A 10/26/2021    Procedure: Pacemaker SC new;  Surgeon: Calixto Fiore MD;  Location: [facility] CATH INVASIVE LOCATION;  Service: Cardiovascular;  Laterality: N/A;   • CHOLECYSTECTOMY     • INSERT / REPLACE / REMOVE PACEMAKER     • KNEE SURGERY Right        Family History:  Family History   Problem Relation Age of Onset   • Colon cancer Neg Hx        Social History:   reports that she quit smoking about 5 years ago. Her smoking use included cigarettes. She has a 80.00 pack-year smoking history. She has never been exposed to tobacco smoke. She has never used smokeless tobacco. She reports that she does not currently use alcohol. She reports that she does not use drugs.    Medications:   Medications Prior to Admission   Medication Sig Dispense Refill Last Dose   • alendronate (FOSAMAX) 70 MG tablet Take 1 tablet by mouth Every 7 (Seven) Days.   Past Week   • brimonidine (ALPHAGAN) 0.2 % ophthalmic solution Administer 1 drop to both eyes 2 (two) times a day.   3/15/2023   • calcium carbonate (TUMS)  "500 MG chewable tablet Chew 1 tablet Daily.   Past Week   • cholecalciferol (VITAMIN D3) 25 MCG (1000 UT) tablet Take 1.25 Units by mouth Take As Directed. Every 5 days   Past Week   • dicyclomine (BENTYL) 20 MG tablet Take 1 tablet by mouth Every 6 (Six) Hours. 90 tablet 3 Past Week   • dorzolamide-timolol (COSOPT) 22.3-6.8 MG/ML ophthalmic solution Administer 1 drop to both eyes 2 (Two) Times a Day.   3/15/2023   • ergocalciferol (ERGOCALCIFEROL) 1.25 MG (85340 UT) capsule Take 1 capsule by mouth Take As Directed. Pt takes every 5 days   3/15/2023   • latanoprost (XALATAN) 0.005 % ophthalmic solution Administer 1 drop to both eyes Every Night.   3/15/2023   • lisinopril (PRINIVIL,ZESTRIL) 10 MG tablet Take 1 tablet by mouth Daily. 90 tablet 3 3/15/2023   • pantoprazole (PROTONIX) 40 MG EC tablet Take 1 tablet by mouth Daily. 30 tablet 4 3/15/2023   • albuterol sulfate  (90 Base) MCG/ACT inhaler Inhale 2 puffs Every 4 (Four) Hours As Needed.      • fluticasone (Flonase) 50 MCG/ACT nasal spray 2 sprays into the nostril(s) as directed by provider Daily for 30 days. 16 g 5        Allergies:  Patient has no known allergies.        Objective     Blood pressure 142/86, pulse 72, temperature 97.4 °F (36.3 °C), temperature source Temporal, resp. rate 18, height 165.1 cm (65\"), weight 87.6 kg (193 lb 2 oz), SpO2 98 %, not currently breastfeeding.    Physical Exam   Constitutional: Pt is oriented to person, place, and time and well-developed, well-nourished, and in no distress.   Mouth/Throat: Oropharynx is clear and moist.   Neck: Normal range of motion.   Cardiovascular: Normal rate, regular rhythm and normal heart sounds.    Pulmonary/Chest: Effort normal and breath sounds normal.   Abdominal: Soft. Nontender  Skin: Skin is warm and dry.   Psychiatric: Mood, memory, affect and judgment normal.     Assessment & Plan     Diagnosis:  diarrhea    Anticipated Surgical Procedure:  colonoscopy    The risks, benefits, and " alternatives of this procedure have been discussed with the patient or the responsible party- the patient understands and agrees to proceed.

## 2023-03-20 ENCOUNTER — TELEPHONE (OUTPATIENT)
Dept: GASTROENTEROLOGY | Facility: CLINIC | Age: 68
End: 2023-03-20
Payer: MEDICARE

## 2023-03-20 LAB
CYTO UR: NORMAL
LAB AP CASE REPORT: NORMAL
LAB AP CLINICAL INFORMATION: NORMAL
PATH REPORT.FINAL DX SPEC: NORMAL
PATH REPORT.GROSS SPEC: NORMAL

## 2023-03-20 RX ORDER — BUDESONIDE 3 MG/1
CAPSULE, COATED PELLETS ORAL
Qty: 161 CAPSULE | Refills: 0 | Status: SHIPPED | OUTPATIENT
Start: 2023-03-20 | End: 2023-03-22 | Stop reason: SDUPTHER

## 2023-03-20 NOTE — TELEPHONE ENCOUNTER
----- Message from KEILA Méndez sent at 3/20/2023 12:32 PM EDT -----  Patient's colon biopsy consistent with lymphocytic colitis.  This is a chronic inflammatory disease of the colon.  This most likely being the cause of the patient's diarrhea.  For treatment we do a budesonide taper which is a steroid that is locally active to help with the inflammation involved with lymphocytic colitis.  It is important to avoid NSAIDs as this can be a trigger or cause of lymphocytic colitis.  It is also important if the patient is a smoker to stop smoking as this increases the risk of microscopic colitis.  I have sent in budesonide taper to the pharmacy.  Maintain follow-up.

## 2023-03-22 RX ORDER — BUDESONIDE 3 MG/1
CAPSULE, COATED PELLETS ORAL
Qty: 161 CAPSULE | Refills: 0 | Status: SHIPPED | OUTPATIENT
Start: 2023-03-22 | End: 2023-06-20

## 2023-03-22 NOTE — TELEPHONE ENCOUNTER
Pt stopped by office.  Gave results.  She stated the Rx was over $100.  Asking for a cheaper Rx.       Found on [facility], it is $53.00 at [facility].  Pt agreed to transfer rx to  pharmacy.  She stated she does not smoke, and will avoid NSAIDS.  Encouraged to keep f/u appt. [facility]

## 2023-03-22 NOTE — TELEPHONE ENCOUNTER
I left vm to call office to discuss results. Stated we sent [employer] message too.  We sent to pharmacy a Rx for her to start. Stated to call office to discuss further.    [facility]

## 2023-05-09 RX ORDER — LISINOPRIL 10 MG/1
TABLET ORAL
Qty: 90 TABLET | Refills: 1 | Status: SHIPPED | OUTPATIENT
Start: 2023-05-09

## 2023-05-23 ENCOUNTER — OFFICE VISIT (OUTPATIENT)
Dept: GASTROENTEROLOGY | Facility: CLINIC | Age: 68
End: 2023-05-23
Payer: MEDICARE

## 2023-05-23 VITALS
WEIGHT: 204 LBS | OXYGEN SATURATION: 97 % | HEART RATE: 95 BPM | SYSTOLIC BLOOD PRESSURE: 134 MMHG | BODY MASS INDEX: 32.78 KG/M2 | DIASTOLIC BLOOD PRESSURE: 93 MMHG | HEIGHT: 66 IN

## 2023-05-23 DIAGNOSIS — K22.70 BARRETT'S ESOPHAGUS WITHOUT DYSPLASIA: ICD-10-CM

## 2023-05-23 DIAGNOSIS — K52.832 LYMPHOCYTIC COLITIS: ICD-10-CM

## 2023-05-23 DIAGNOSIS — K58.0 IRRITABLE BOWEL SYNDROME WITH DIARRHEA: Primary | ICD-10-CM

## 2023-05-23 DIAGNOSIS — K44.9 HIATAL HERNIA: ICD-10-CM

## 2023-05-23 DIAGNOSIS — R19.7 DIARRHEA, UNSPECIFIED TYPE: ICD-10-CM

## 2023-05-23 RX ORDER — HYOSCYAMINE SULFATE 0.125 MG
0.12 TABLET ORAL AS NEEDED
COMMUNITY

## 2023-05-23 NOTE — PROGRESS NOTES
"Chief Complaint    Jana I Schoenbaechler is a 67 y.o. female who presents to [facility] for follow-up of recent colonoscopy because of diarrhea.  Biopsy showed lymphocytic colitis and the patient just now completed 6-week course of budesonide.  Overall her symptoms are much improved.  She is having 1-2 stools per day \"putting\" consistency.  She still uses Imodium intermittently for control problems and uses Levsin intermittently for abdominal cramping.  She is not taking any NSAIDs currently and uses Tylenol for headaches.    Result Review :     The following data was reviewed by: Vito Maurer MD on 05/23/2023:    CMP        9/1/2022    09:47 2/20/2023    07:49   CMP   Creatinine 0.88   0.84     EGFR 72.6   76.3     Calcium 10.5   8.9             Data reviewed: GI studies Reviewed     Past Medical History:   Diagnosis Date   • Arthritis    • CHB s/p cardiac pacemaker 10/25/2021   • Chronic HFrEF (heart failure with reduced ejection fraction)  10/25/2021    10/25/21 echo: Estimated right ventricular systolic pressure from tricuspid regurgitation is markedly elevated (>55 mmHg). Mild LVH. Estimated left ventricular EF = 45% Left ventricular systolic function is mildly decreased.     • GERD (gastroesophageal reflux disease)    • Glaucoma     LEFT EYE   • Hypertension    • Osteoporosis        Past Surgical History:   Procedure Laterality Date   • CARDIAC CATHETERIZATION N/A 10/26/2021    Procedure: Left Heart Cath;  Surgeon: Calixto Fiore MD;  Location: [facility] INVASIVE LOCATION;  Service: Cardiovascular;  Laterality: N/A;   • CARDIAC ELECTROPHYSIOLOGY PROCEDURE N/A 10/26/2021    Procedure: Pacemaker SC new;  Surgeon: Calixto Fiore MD;  Location: [facility] CATH INVASIVE LOCATION;  Service: Cardiovascular;  Laterality: N/A;   • CHOLECYSTECTOMY     • COLONOSCOPY N/A 3/16/2023    Procedure: COLONOSCOPY WITH RANDOM COLON BIOPSIES;  Surgeon: Vito Maurer MD;  Location: " "[facility] ENDOSCOPY;  Service: Gastroenterology;  Laterality: N/A;  NORMAL COLON, NORMAL TI   • INSERT / REPLACE / REMOVE PACEMAKER     • KNEE SURGERY Right        Social History     Social History Narrative   • Not on file       Objective     Vital Signs:   /93 (BP Location: Right arm, Patient Position: Sitting, Cuff Size: Adult)   Pulse 95   Ht 167.6 cm (66\")   Wt 92.5 kg (204 lb)   SpO2 97%   BMI 32.93 kg/m²     Body mass index is 32.93 kg/m².    Physical Exam            Assessment and Plan    Diagnoses and all orders for this visit:    1. Irritable bowel syndrome with diarrhea (Primary)    2. Diarrhea, unspecified type    3. Chowdary's esophagus without dysplasia    4. Hiatal hernia    5. Lymphocytic colitis      Patient has completed her budesonide taper for lymphocytic colitis and hopefully her diarrhea complaints will continue to be resolved.  She will call with any recurrent symptoms.  Otherwise she will follow-up in our office in about 3 months to monitor her  progress            Follow Up   No follow-ups on file.  Patient was given instructions and counseling regarding her condition or for health maintenance advice. Please see specific information pulled into the AVS if appropriate.     "

## 2023-05-26 PROBLEM — H25.13 AGE-RELATED NUCLEAR CATARACT OF BOTH EYES: Status: ACTIVE | Noted: 2021-10-08

## 2023-05-26 PROBLEM — R06.00 DYSPNEA: Status: RESOLVED | Noted: 2022-02-09 | Resolved: 2023-05-26

## 2023-05-26 PROBLEM — I44.2 CHB (COMPLETE HEART BLOCK): Status: RESOLVED | Noted: 2022-05-11 | Resolved: 2023-05-26

## 2023-05-26 PROBLEM — R05.9 COUGH: Status: RESOLVED | Noted: 2022-02-09 | Resolved: 2023-05-26

## 2023-05-26 PROBLEM — R19.7 DIARRHEA: Status: RESOLVED | Noted: 2023-02-14 | Resolved: 2023-05-26

## 2023-05-30 ENCOUNTER — OFFICE VISIT (OUTPATIENT)
Dept: CARDIOLOGY | Facility: CLINIC | Age: 68
End: 2023-05-30

## 2023-05-30 ENCOUNTER — CLINICAL SUPPORT NO REQUIREMENTS (OUTPATIENT)
Dept: CARDIOLOGY | Facility: CLINIC | Age: 68
End: 2023-05-30

## 2023-05-30 VITALS
BODY MASS INDEX: 32.47 KG/M2 | HEART RATE: 68 BPM | DIASTOLIC BLOOD PRESSURE: 69 MMHG | HEIGHT: 66 IN | SYSTOLIC BLOOD PRESSURE: 139 MMHG | WEIGHT: 202 LBS

## 2023-05-30 DIAGNOSIS — I10 ESSENTIAL HYPERTENSION: ICD-10-CM

## 2023-05-30 DIAGNOSIS — Z95.0 PRESENCE OF CARDIAC PACEMAKER: Primary | ICD-10-CM

## 2023-05-30 DIAGNOSIS — Z95.0 PRESENCE OF CARDIAC PACEMAKER: ICD-10-CM

## 2023-05-30 DIAGNOSIS — I44.2 CHB (COMPLETE HEART BLOCK): Primary | ICD-10-CM

## 2023-05-30 DIAGNOSIS — I50.22 CHRONIC HFREF (HEART FAILURE WITH REDUCED EJECTION FRACTION): ICD-10-CM

## 2023-05-30 RX ORDER — LISINOPRIL 10 MG/1
10 TABLET ORAL DAILY
Qty: 30 TABLET | Refills: 11 | Status: SHIPPED | OUTPATIENT
Start: 2023-05-30

## 2023-05-30 RX ORDER — LISINOPRIL 10 MG/1
10 TABLET ORAL DAILY
Qty: 90 TABLET | Refills: 3 | Status: SHIPPED | OUTPATIENT
Start: 2023-05-30 | End: 2023-05-30

## 2023-05-30 NOTE — PROGRESS NOTES
Chief Complaint  Follow-up, Pacemaker Check, and Hypertension    Subjective            History of Present Illness  Jana I Schoenbaechler is a 67-year-old white/ female patient who presents to the office today for follow-up.  She has presence of pacemaker, chronic HFrEF, and hypertension.  She reports compliance with her medications. She has had occasional chest discomfort which is being attributed to the PMT episodes with her pacemaker, some adjustments have been made today to help keep this from happening in the future.   She denies any shortness of breath, lightheadedness/dizziness, palpitations, or edema.    PMH  Past Medical History:   Diagnosis Date   • Arthritis    • CHB s/p cardiac pacemaker 10/25/2021   • Chronic HFrEF (heart failure with reduced ejection fraction)  10/25/2021    10/25/21 echo: Estimated right ventricular systolic pressure from tricuspid regurgitation is markedly elevated (>55 mmHg). Mild LVH. Estimated left ventricular EF = 45% Left ventricular systolic function is mildly decreased.     • GERD (gastroesophageal reflux disease)    • Glaucoma     LEFT EYE   • Hypertension    • Osteoporosis          ALLERGY  No Known Allergies       SURGICALHX  Past Surgical History:   Procedure Laterality Date   • CARDIAC CATHETERIZATION N/A 10/26/2021    Procedure: Left Heart Cath;  Surgeon: Calixto Fiore MD;  Location: [facility] CATH INVASIVE LOCATION;  Service: Cardiovascular;  Laterality: N/A;   • CARDIAC ELECTROPHYSIOLOGY PROCEDURE N/A 10/26/2021    Procedure: Pacemaker SC new;  Surgeon: Calixto Fiore MD;  Location: [facility] CATH INVASIVE LOCATION;  Service: Cardiovascular;  Laterality: N/A;   • CHOLECYSTECTOMY     • COLONOSCOPY N/A 3/16/2023    Procedure: COLONOSCOPY WITH RANDOM COLON BIOPSIES;  Surgeon: Vito Maurer MD;  Location: [facility] ENDOSCOPY;  Service: Gastroenterology;  Laterality: N/A;  NORMAL COLON, NORMAL TI   • INSERT / REPLACE / REMOVE PACEMAKER     • KNEE SURGERY Right      "      SOC  Social History     Socioeconomic History   • Marital status:    Tobacco Use   • Smoking status: Former     Packs/day: 2.00     Years: 40.00     Pack years: 80.00     Types: Cigarettes     Quit date: 2017     Years since quittin.1     Passive exposure: Never   • Smokeless tobacco: Never   Vaping Use   • Vaping Use: Former   • Substances: Nicotine   • Devices: RefAgilyxble tank   Substance and Sexual Activity   • Alcohol use: Not Currently   • Drug use: Never   • Sexual activity: Not Currently         FAMHX  Family History   Problem Relation Age of Onset   • Colon cancer Neg Hx           MEDSIGONLY  Current Outpatient Medications on File Prior to Visit   Medication Sig   • alendronate (FOSAMAX) 70 MG tablet Take 1 tablet by mouth Every 7 (Seven) Days.   • brimonidine (ALPHAGAN) 0.2 % ophthalmic solution Administer 1 drop to both eyes 2 (two) times a day.   • cholecalciferol (VITAMIN D3) 25 MCG (1000 UT) tablet Take 1.25 Units by mouth Take As Directed. Every 5 days   • dicyclomine (BENTYL) 20 MG tablet Take 1 tablet by mouth Every 6 (Six) Hours.   • dorzolamide-timolol (COSOPT) 22.3-6.8 MG/ML ophthalmic solution Administer 1 drop to both eyes 2 (Two) Times a Day.   • ergocalciferol (ERGOCALCIFEROL) 1.25 MG (66738 UT) capsule Take 1 capsule by mouth Take As Directed. Pt takes every 5 days   • fluticasone (Flonase) 50 MCG/ACT nasal spray 2 sprays into the nostril(s) as directed by provider Daily for 30 days.   • hyoscyamine (ANASPAZ,LEVSIN) 0.125 MG tablet Take 1 tablet by mouth As Needed for Cramping.   • latanoprost (XALATAN) 0.005 % ophthalmic solution Administer 1 drop to both eyes Every Night.   • lisinopril (PRINIVIL,ZESTRIL) 10 MG tablet Take 1 tablet by mouth once daily   • pantoprazole (PROTONIX) 40 MG EC tablet Take 1 tablet by mouth Daily.     No current facility-administered medications on file prior to visit.         Objective   /69   Pulse 68   Ht 167.6 cm (66\")   Wt 91.6 " kg (202 lb)   BMI 32.60 kg/m²       Physical Exam  Constitutional:       Appearance: She is obese.   HENT:      Head: Normocephalic.   Neck:      Vascular: No carotid bruit.   Cardiovascular:      Rate and Rhythm: Normal rate and regular rhythm.      Pulses: Normal pulses.      Heart sounds: Normal heart sounds. No murmur heard.  Pulmonary:      Effort: Pulmonary effort is normal.      Breath sounds: Normal breath sounds.   Musculoskeletal:      Cervical back: Neck supple.      Right lower leg: No edema.      Left lower leg: No edema.   Skin:     General: Skin is dry.   Neurological:      Mental Status: She is alert and oriented to person, place, and time.   Psychiatric:         Behavior: Behavior normal.       Result Review :   The following data was reviewed by: KEILA Nicholas on 05/30/2023:  proBNP   Date Value Ref Range Status   10/25/2021 2,605.0 (H) 0.0 - 900.0 pg/mL Final     CMP        2/20/2023    07:49   CMP   Creatinine 0.84     EGFR 76.3     Calcium 8.9          Lab Results   Component Value Date    TSH 1.190 11/25/2019      No results found for: FREET4   No results found for: DDIMERQUANT  Magnesium   Date Value Ref Range Status   10/26/2021 1.9 1.6 - 2.4 mg/dL Final      No results found for: DIGOXIN   Lab Results   Component Value Date    TROPONINT 0.013 10/26/2021         Results for orders placed in visit on 05/02/22    Adult Transthoracic Echo Complete W/ Cont if Necessary Per Protocol    Interpretation Summary  · Estimated left ventricular EF = 50% Left ventricular systolic function is low normal with area of apical septal hypokinesis  · Left ventricular wall thickness is consistent with mild concentric hypertrophy.  · The left ventricular cavity is mildly dilated.         Assessment and Plan    Diagnoses and all orders for this visit:    1. CHB s/p cardiac pacemaker (Primary)  Her device was interrogated in office today and adjustments were made to the PVARB setting.  She has 7 years left  on battery life.    2. Chronic HFrEF (heart failure with reduced ejection fraction)   Symptomatically stable and euvolemic on exam today, continue lisinopril 10 mg daily.    3. Essential hypertension  Currently controlled and without adverse effects from medication, continue lisinopril 10 mg daily.  Low-sodium diet discussed.    Other orders  -     lisinopril (PRINIVIL,ZESTRIL) 10 MG tablet; Take 1 tablet by mouth Daily.  Dispense: 30 tablet; Refill: 11            Follow Up   Return in about 6 months (around 11/30/2023) for Follow up with Dr Fiore.    Patient was given instructions and counseling regarding her condition or for health maintenance advice. Please see specific information pulled into the AVS if appropriate.     Emma I Schoenbaechler  reports that she quit smoking about 6 years ago. Her smoking use included cigarettes. She has a 80.00 pack-year smoking history. She has never been exposed to tobacco smoke. She has never used smokeless tobacco.         Candace Hillman, KEILA  05/30/23  14:03 EDT    Dictated Utilizing Dragon Dictation

## 2023-05-30 NOTE — PROGRESS NOTES
Normal Dual Chamber Pacemaker Device Interrogation and Device Testing.  Normal evaluation of device function and lead measurements.  No optimization was needed of parameters or maximization of device longevity.  Patient is on automated Home Remote Monitoring. I extended PVARB to 410 ms due to PMT episodes and she is symptomatic to the PMT.  Pacemaker Dependent.

## 2023-08-01 DIAGNOSIS — K22.719 BARRETT'S ESOPHAGUS WITH DYSPLASIA: ICD-10-CM

## 2023-08-01 RX ORDER — PANTOPRAZOLE SODIUM 40 MG/1
TABLET, DELAYED RELEASE ORAL
Qty: 30 TABLET | Refills: 0 | Status: SHIPPED | OUTPATIENT
Start: 2023-08-01

## 2023-08-23 DIAGNOSIS — K22.719 BARRETT'S ESOPHAGUS WITH DYSPLASIA: ICD-10-CM

## 2023-08-23 RX ORDER — PANTOPRAZOLE SODIUM 40 MG/1
TABLET, DELAYED RELEASE ORAL
Qty: 90 TABLET | Refills: 1 | Status: SHIPPED | OUTPATIENT
Start: 2023-08-23

## 2023-09-12 ENCOUNTER — TRANSCRIBE ORDERS (OUTPATIENT)
Dept: ADMINISTRATIVE | Facility: HOSPITAL | Age: 68
End: 2023-09-12
Payer: MEDICARE

## 2023-09-12 DIAGNOSIS — Z12.31 VISIT FOR SCREENING MAMMOGRAM: Primary | ICD-10-CM

## 2023-09-20 ENCOUNTER — TRANSCRIBE ORDERS (OUTPATIENT)
Dept: LAB | Facility: HOSPITAL | Age: 68
End: 2023-09-20
Payer: MEDICARE

## 2023-09-20 DIAGNOSIS — Z79.899 ENCOUNTER FOR LONG-TERM (CURRENT) USE OF OTHER MEDICATIONS: ICD-10-CM

## 2023-09-20 DIAGNOSIS — M81.0 OSTEOPOROSIS, POST-MENOPAUSAL: Primary | ICD-10-CM

## 2023-09-20 NOTE — PROGRESS NOTES
Chief Complaint   4m follow up    History of Present Illness       Jana I Schoenbaechler is a 67 y.o. female who presents to [facility] GASTROENTEROLOGY for follow-up with a history of lymphocytic colitis.  Patient has not been on budesonide since May and reports tremendous improvement in her diarrhea having 1-2 soft stools per day.  Patient continues use of Imodium as needed.  Patient uses Bentyl as needed as well.  Overall patient is satisfied where her bowel movements are.  Patient denies fever, nausea, vomiting, weight loss, night sweats, melena, hematochezia, hematemesis.    Colonoscopy: Review of the patient's most recent colonoscopy performed by Dr. Maurer on 03.16.2023 normal colonoscopy lymphocytic colitis    EGD: Review of the patient's most recent EGD performed by Dr. Maurer on 03.04.2021 Small hiatal hernia, benign intrinsic stricture dilated to 18 mm, salmon-colored short segment Chowdary's esophagus.  Normal stomach and duodenum.  Repeat EGD in 3 years for surveillance of Chowdary's esophagus.  Reflux esophagitis noted on biopsy.    Most recent labs on 09.2023  Results       Result Review :   The following data was reviewed by: KEILA Méndez on 09/25/2023:    CMP          2/20/2023    07:49 9/21/2023    09:24   CMP   Creatinine 0.84  0.93    EGFR 76.3  67.5    Calcium 8.9  9.2          Iron Profile No results found for: IRON, TIBC, LABIRON, TRANSFERRIN  Ferritin No results found for: FERRITIN            Past Medical History       Past Medical History:   Diagnosis Date    Arthritis     CHB s/p cardiac pacemaker 10/25/2021    Chronic HFrEF (heart failure with reduced ejection fraction)  10/25/2021    10/25/21 echo: Estimated right ventricular systolic pressure from tricuspid regurgitation is markedly elevated (>55 mmHg). Mild LVH. Estimated left ventricular EF = 45% Left ventricular systolic function is mildly decreased.      GERD (gastroesophageal reflux disease)     Glaucoma      LEFT EYE    Hypertension     Osteoporosis        Past Surgical History:   Procedure Laterality Date    CARDIAC CATHETERIZATION N/A 10/26/2021    Procedure: Left Heart Cath;  Surgeon: Calixto Fiore MD;  Location: [facility] CATH INVASIVE LOCATION;  Service: Cardiovascular;  Laterality: N/A;    CARDIAC ELECTROPHYSIOLOGY PROCEDURE N/A 10/26/2021    Procedure: Pacemaker SC new;  Surgeon: Calixto Fiore MD;  Location: [facility] CATH INVASIVE LOCATION;  Service: Cardiovascular;  Laterality: N/A;    CHOLECYSTECTOMY      COLONOSCOPY N/A 03/16/2023    Procedure: COLONOSCOPY WITH RANDOM COLON BIOPSIES;  Surgeon: Vito Maurer MD;  Location: [facility] ENDOSCOPY;  Service: Gastroenterology;  Laterality: N/A;  NORMAL COLON, NORMAL TI    EYE SURGERY Left     INSERT / REPLACE / REMOVE PACEMAKER      KNEE SURGERY Right     UPPER GASTROINTESTINAL ENDOSCOPY           Current Outpatient Medications:     alendronate (FOSAMAX) 70 MG tablet, Take 1 tablet by mouth Every 7 (Seven) Days., Disp: , Rfl:     brimonidine (ALPHAGAN) 0.2 % ophthalmic solution, Administer 1 drop to both eyes 2 (two) times a day., Disp: , Rfl:     cholecalciferol (VITAMIN D3) 25 MCG (1000 UT) tablet, Take 1.25 Units by mouth Take As Directed. Every 5 days, Disp: , Rfl:     dicyclomine (BENTYL) 20 MG tablet, Take 1 tablet by mouth Every 6 (Six) Hours., Disp: 90 tablet, Rfl: 3    dorzolamide-timolol (COSOPT) 22.3-6.8 MG/ML ophthalmic solution, Administer 1 drop to both eyes 2 (Two) Times a Day., Disp: , Rfl:     ergocalciferol (ERGOCALCIFEROL) 1.25 MG (43133 UT) capsule, Take 1 capsule by mouth Take As Directed. Pt takes every 5 days, Disp: , Rfl:     hyoscyamine (ANASPAZ,LEVSIN) 0.125 MG tablet, Take 1 tablet by mouth As Needed for Cramping., Disp: , Rfl:     latanoprost (XALATAN) 0.005 % ophthalmic solution, Administer 1 drop to both eyes Every Night., Disp: , Rfl:     lisinopril (PRINIVIL,ZESTRIL) 10 MG tablet, Take 1 tablet by mouth Daily., Disp: 30 tablet, Rfl:  "11    pantoprazole (PROTONIX) 40 MG EC tablet, Take 1 tablet by mouth once daily, Disp: 90 tablet, Rfl: 1    fluticasone (Flonase) 50 MCG/ACT nasal spray, 2 sprays into the nostril(s) as directed by provider Daily for 30 days., Disp: 16 g, Rfl: 5     No Known Allergies    Family History   Problem Relation Age of Onset    Colon cancer Neg Hx         Social History     Social History Narrative    Not on file       Objective     Vital Signs:   /67 (BP Location: Left arm, Patient Position: Sitting, Cuff Size: Adult)   Pulse 73   Ht 167.6 cm (66\")   Wt 90.7 kg (200 lb)   SpO2 100%   BMI 32.28 kg/m²       Physical Exam  Constitutional:       Appearance: Normal appearance. She is normal weight.   Pulmonary:      Effort: Pulmonary effort is normal.   Abdominal:      General: Abdomen is flat.   Neurological:      General: No focal deficit present.      Mental Status: She is alert and oriented to person, place, and time.   Psychiatric:         Mood and Affect: Mood normal.         Behavior: Behavior normal.         Assessment & Plan          Assessment and Plan    Diagnoses and all orders for this visit:    1. Chowdary's esophagus with dysplasia (Primary)    2. Irritable bowel syndrome with diarrhea    3. Hiatal hernia    4. Lymphocytic colitis    5. Diarrhea, unspecified type    67-year-old female presenting the office today for follow-up with a history of Chowdary's esophagus, IBS diarrhea, hiatal hernia, lymphocytic colitis and previous diarrhea.  Patient reports that her diarrhea is currently well controlled with Bentyl and use of intermittent Imodium.  Patient has been off of budesonide taper since May with good control of her diarrhea.  Patient continues on Protonix which is working well for her reflux and Chowdary's esophagus.  Patient will be due for repeat EGD in the spring which she is set up for a telephone screening.  Patient will follow-up in office after endoscopy.  Patient agreeable to this plan will " call with any questions or concerns.            Follow Up       Follow Up   Return for Follow up after endoscopy in office.  Patient was given instructions and counseling regarding her condition or for health maintenance advice. Please see specific information pulled into the AVS if appropriate.

## 2023-09-21 ENCOUNTER — LAB (OUTPATIENT)
Dept: LAB | Facility: HOSPITAL | Age: 68
End: 2023-09-21

## 2023-09-21 DIAGNOSIS — M81.0 OSTEOPOROSIS, POST-MENOPAUSAL: ICD-10-CM

## 2023-09-21 DIAGNOSIS — Z79.899 ENCOUNTER FOR LONG-TERM (CURRENT) USE OF OTHER MEDICATIONS: ICD-10-CM

## 2023-09-21 LAB
25(OH)D3 SERPL-MCNC: 49.9 NG/ML (ref 30–100)
CALCIUM SPEC-SCNC: 9.2 MG/DL (ref 8.6–10.5)
CREAT SERPL-MCNC: 0.93 MG/DL (ref 0.57–1)
EGFRCR SERPLBLD CKD-EPI 2021: 67.5 ML/MIN/1.73

## 2023-09-21 PROCEDURE — 36415 COLL VENOUS BLD VENIPUNCTURE: CPT

## 2023-09-21 PROCEDURE — 82565 ASSAY OF CREATININE: CPT

## 2023-09-21 PROCEDURE — 82310 ASSAY OF CALCIUM: CPT

## 2023-09-21 PROCEDURE — 82306 VITAMIN D 25 HYDROXY: CPT

## 2023-09-25 ENCOUNTER — OFFICE VISIT (OUTPATIENT)
Dept: GASTROENTEROLOGY | Facility: CLINIC | Age: 68
End: 2023-09-25

## 2023-09-25 VITALS
WEIGHT: 200 LBS | SYSTOLIC BLOOD PRESSURE: 118 MMHG | OXYGEN SATURATION: 100 % | HEIGHT: 66 IN | BODY MASS INDEX: 32.14 KG/M2 | DIASTOLIC BLOOD PRESSURE: 67 MMHG | HEART RATE: 73 BPM

## 2023-09-25 DIAGNOSIS — K22.719 BARRETT'S ESOPHAGUS WITH DYSPLASIA: Primary | ICD-10-CM

## 2023-09-25 DIAGNOSIS — K58.0 IRRITABLE BOWEL SYNDROME WITH DIARRHEA: ICD-10-CM

## 2023-09-25 DIAGNOSIS — K52.832 LYMPHOCYTIC COLITIS: ICD-10-CM

## 2023-09-25 DIAGNOSIS — K44.9 HIATAL HERNIA: ICD-10-CM

## 2023-09-25 DIAGNOSIS — R19.7 DIARRHEA, UNSPECIFIED TYPE: ICD-10-CM

## 2023-09-25 PROCEDURE — 1160F RVW MEDS BY RX/DR IN RCRD: CPT | Performed by: NURSE PRACTITIONER

## 2023-09-25 PROCEDURE — 3074F SYST BP LT 130 MM HG: CPT | Performed by: NURSE PRACTITIONER

## 2023-09-25 PROCEDURE — 1159F MED LIST DOCD IN RCRD: CPT | Performed by: NURSE PRACTITIONER

## 2023-09-25 PROCEDURE — 3078F DIAST BP <80 MM HG: CPT | Performed by: NURSE PRACTITIONER

## 2023-09-25 PROCEDURE — 99214 OFFICE O/P EST MOD 30 MIN: CPT | Performed by: NURSE PRACTITIONER

## 2023-09-25 NOTE — PATIENT INSTRUCTIONS
Food Choices for Gastroesophageal Reflux Disease, Adult  When you have gastroesophageal reflux disease (GERD), the foods you eat and your eating habits are very important. Choosing the right foods can help ease your discomfort. Think about working with a food expert (dietitian) to help you make good choices.  What are tips for following this plan?  Reading food labels  Look for foods that are low in saturated fat. Foods that may help with your symptoms include:  Foods that have less than 5% of daily value (DV) of fat.  Foods that have 0 grams of trans fat.  Cooking  Do not melvin your food.  Cook your food by baking, steaming, grilling, or broiling. These are all methods that do not need a lot of fat for cooking.  To add flavor, try to use herbs that are low in spice and acidity.  Meal planning    Choose healthy foods that are low in fat, such as:  Fruits and vegetables.  Whole grains.  Low-fat dairy products.  Lean meats, fish, and poultry.  Eat small meals often instead of eating 3 large meals each day. Eat your meals slowly in a place where you are relaxed. Avoid bending over or lying down until 2-3 hours after eating.  Limit high-fat foods such as fatty meats or fried foods.  Limit your intake of fatty foods, such as oils, butter, and shortening.  Avoid the following as told by your doctor:  Foods that cause symptoms. These may be different for different people. Keep a food diary to keep track of foods that cause symptoms.  Alcohol.  Drinking a lot of liquid with meals.  Eating meals during the 2-3 hours before bed.  Lifestyle  Stay at a healthy weight. Ask your doctor what weight is healthy for you. If you need to lose weight, work with your doctor to do so safely.  Exercise for at least 30 minutes on 5 or more days each week, or as told by your doctor.  Wear loose-fitting clothes.  Do not smoke or use any products that contain nicotine or tobacco. If you need help quitting, ask your doctor.  Sleep with the head  of your bed higher than your feet. Use a wedge under the mattress or blocks under the bed frame to raise the head of the bed.  Chew sugar-free gum after meals.  What foods should eat?    Eat a healthy, well-balanced diet of fruits, vegetables, whole grains, low-fat dairy products, lean meats, fish, and poultry. Each person is different.  Foods that may cause symptoms in one person may not cause any symptoms in another person. Work with your doctor to find foods that are safe for you.  The items listed above may not be a complete list of what you can eat and drink. Contact a food expert for more options.  What foods should I avoid?  Limiting some of these foods may help in managing the symptoms of GERD. Everyone is different. Talk with a food expert or your doctor to help you find the exact foods to avoid, if any.  Fruits  Any fruits prepared with added fat. Any fruits that cause symptoms. For some people, this may include citrus fruits, such as oranges, grapefruit, pineapple, and viral.  Vegetables  Deep-fried vegetables. French fries. Any vegetables prepared with added fat. Any vegetables that cause symptoms. For some people, this may include tomatoes and tomato products, chili peppers, onions and garlic, and horseradish.  Grains  Pastries or quick breads with added fat.  Meats and other proteins  High-fat meats, such as fatty beef or pork, hot dogs, ribs, ham, sausage, salami, and jorgensen. Fried meat or protein, including fried fish and fried chicken. Nuts and nut butters, in large amounts.  Dairy  Whole milk and chocolate milk. Sour cream. Cream. Ice cream. Cream cheese. Milkshakes.  Fats and oils  Butter. Margarine. Shortening. Ghee.  Beverages  Coffee and tea, with or without caffeine. Carbonated beverages. Sodas. Energy drinks. Fruit juice made with acidic fruits, such as orange or grapefruit. Tomato juice. Alcoholic drinks.  Sweets and desserts  Chocolate and cocoa. Donuts.  Seasonings and condiments  Pepper.  Peppermint and spearmint. Added salt. Any condiments, herbs, or seasonings that cause symptoms. For some people, this may include decker, hot sauce, or vinegar-based salad dressings.  The items listed above may not be a complete list of what you should not eat and drink. Contact a food expert for more options.  Questions to ask your doctor  Diet and lifestyle changes are often the first steps that are taken to manage symptoms of GERD. If diet and lifestyle changes do not help, talk with your doctor about taking medicines.  Where to find more information  International [employer] Gastrointestinal Disorders: aboutgerd.org  Summary  When you have GERD, food and lifestyle choices are very important in easing your symptoms.  Eat small meals often instead of 3 large meals a day. Eat your meals slowly and in a place where you are relaxed.  Avoid bending over or lying down until 2-3 hours after eating.  Limit high-fat foods such as fatty meats or fried foods.  This information is not intended to replace advice given to you by your health care provider. Make sure you discuss any questions you have with your health care provider.  Document Revised: 06/28/2021 Document Reviewed: 06/28/2021  [employer] Patient Education © 2023 [employer]  Chowdary's Esophagus    Chowdary's esophagus occurs when the tissue that lines the esophagus changes or becomes damaged. The esophagus is the tube that carries food from the throat to the stomach. With Chowdary's esophagus, the cells that line the esophagus are replaced by cells that are similar to the lining of the intestines (intestinal metaplasia).  Chowdary's esophagus itself may not cause any symptoms. However, many people who have Chowdary's esophagus also have gastroesophageal reflux disease (GERD), which may cause symptoms such as heartburn. Over time, a few people with this condition may develop cancer of the esophagus. Treatment may include medicines, procedures to destroy the  abnormal cells, or surgery.  What are the causes?  The exact cause of this condition is not known. In some cases, the condition develops from damage to the lining of the esophagus caused by GERD. GERD occurs when stomach acids flow up from the stomach into the esophagus. Frequent symptoms of GERD may cause intestinal metaplasia or cause cell changes (dysplasia).  What increases the risk?  You are more likely to develop this condition if you:  Have GERD.  Are male.  Are .  Are obese.  Are older than 50.  Have a hiatal hernia. This is a condition in which part of your stomach bulges into your chest.  Smoke.  What are the signs or symptoms?  People with Chowdary's esophagus often have no symptoms. However, many people with this condition also have GERD. Symptoms of GERD may include:  Heartburn.  Difficulty swallowing.  Dry cough.  How is this diagnosed?  This condition may be diagnosed based on:  Results of an upper gastrointestinal endoscopy. For this exam, a thin, flexible tube with a light and a camera on the end (endoscope) is passed down your esophagus. Your health care provider can view the inside of your esophagus during this procedure.  Results of a biopsy. For this procedure, several tissue samples are removed (biopsy) from your esophagus. They are then checked for intestinal metaplasia or dysplasia.  How is this treated?  Treatment for this condition may include:  Medicines (proton pump inhibitors, or PPIs) to decrease or stop GERD.  Periodic endoscopic exams to make sure that cancer is not developing.  A procedure or surgery for dysplasia. This may include:  Removal or destruction of abnormal cells.  Removal of part of the esophagus (esophagectomy).  Follow these instructions at home:  Eating and drinking  Eat more fruits and vegetables.  Avoid fatty foods.  Eat small, frequent meals instead of large meals.  Avoid foods that cause heartburn. These foods include:  Coffee and alcoholic  drinks.  Tomatoes and foods made with tomatoes.  Greasy or spicy foods.  Chocolate and peppermint.  Do not drink alcohol.  General instructions  Take over-the-counter and prescription medicines only as told by your health care provider.  Do not use any products that contain nicotine or tobacco, such as cigarettes and e-cigarettes. If you need help quitting, ask your health care provider.  If you are being treated for GERD, make sure you take medicines and follow all instructions as told by your health care provider.  Keep all follow-up visits as told by your health care provider. This is important.  Contact a health care provider if:  You have heartburn or GERD symptoms.  You have difficulty swallowing.  Get help right away if:  You have chest pain.  You are unable to swallow.  You vomit blood or material that looks like coffee grounds.  Your stool (feces) is bright red or dark.  Summary  Chowdary's esophagus occurs when the tissue that lines the esophagus changes or becomes damaged.  Chowdary's esophagus may be diagnosed with an upper gastrointestinal endoscopy and a biopsy.  Treatment may include medicines, procedures to remove abnormal cells, or surgery.  Follow your health care provider's instructions about what to eat and drink, what medicines to take, and when to call for help.  This information is not intended to replace advice given to you by your health care provider. Make sure you discuss any questions you have with your health care provider.  Document Revised: 04/15/2019 Document Reviewed: 04/15/2019  [employer] Patient Education © 2021 [employer]

## 2023-10-04 ENCOUNTER — HOSPITAL ENCOUNTER (OUTPATIENT)
Dept: CT IMAGING | Facility: HOSPITAL | Age: 68
Discharge: HOME | End: 2023-10-04
Admitting: NURSE PRACTITIONER
Payer: MEDICARE

## 2023-10-04 DIAGNOSIS — R91.1 LUNG NODULE: ICD-10-CM

## 2023-10-04 PROCEDURE — 71250 CT THORAX DX C-: CPT

## 2023-10-10 NOTE — PROGRESS NOTES
Clinic Care Coordination Contact  Care Team Conversations    No callback received from TCU following previously outreach by CC RN to inquire about patient's status/TCU discharge planning.    Per chart review, [facility] but has plan to transition into LTC on Monday 6/28/21.  CC RN called and left voicemail message for DAVID Cavazos at TCU; requested call back to confirm this plan as well as to provide information, if available, on who will take over as patient's provider once she transitions to LTC so patient's chart can be updated accordingly.  CC RN will await return call.    Tyron Luna, RN  Clinic Care Coordinator  [facility]  [facility]  Ph: [phone]   Primary Care Provider  Rosa Isela Ortiz APRN     Referring Provider  No ref. provider found     Chief Complaint  Follow-up and Lung Nodule    Subjective          History of Presenting Illness  Patient is a 67-year-old female, patient of Dr. Mcintyre who presents for management of dyspnea and lung nodule who presents for a follow-up visit today.  Patient states that since last visit her breathing is doing well.  Patient states that time she does get short of breath that is worse with heavy exertion, mild in severity, and improved with rest.  Patient currently denies any coughing or wheezing.  Patient states that she has tried inhalers in the past did not help and therefore does not want any inhalers.  Since last office visit patient had a chest CT scan completed on 10/6/90665. Report states stable 6 mm noncalcified right upper lobe nodule.  This is likely benign given 2 year stability.  Patient denies fever, chills, night sweats, swollen glands in the head and neck, unintentional weight loss, hemoptysis, purulent sputum production, dysphagia, chest pain, palpitations, chest tightness, abdominal pain, nausea, vomiting, and diarrhea.  Patient also denies any myalgias, changes in sense of taste and/or smell, sore throat, any other coronavirus or flu-like symptoms.  Patient denies any leg swelling, orthopnea, paroxysmal nocturnal dyspnea.  Patient is able to perform activities of daily living.        Review of Systems   Constitutional:  Negative for activity change, appetite change, chills, diaphoresis, fatigue, fever, unexpected weight gain and unexpected weight loss.        Negative for Insomnia   HENT:  Negative for congestion (Nasal), mouth sores, nosebleeds, postnasal drip, sore throat, swollen glands and trouble swallowing.         Negative for Thrush  Negative for Hoarseness  Negative for Allergies/Hay Fever  Negative for Recent Head injury  Negative for Ear Fullness  Negative for Nasal or Sinus pain  Negative  for Dry lips  Negative for Nasal discharge   Respiratory:  Negative for apnea, cough, chest tightness, shortness of breath and wheezing.         Negative for Hemoptysis  Negative for Pleuritic pain   Cardiovascular:  Negative for chest pain, palpitations and leg swelling.        Negative for Claudication  Negative for Cyanosis  Negative for Dyspnea on exertion   Gastrointestinal:  Negative for abdominal pain, diarrhea, nausea, vomiting and GERD.   Musculoskeletal:  Negative for joint swelling and myalgias.        Negative for Joint pain  Negative for Joint stiffness   Skin:  Negative for color change, dry skin, pallor and rash.   Neurological:  Negative for syncope, weakness and headache.   Hematological:  Negative for adenopathy. Does not bruise/bleed easily.        Family History   Problem Relation Age of Onset    Colon cancer Neg Hx         Social History     Socioeconomic History    Marital status:    Tobacco Use    Smoking status: Former     Packs/day: 2.00     Years: 40.00     Additional pack years: 0.00     Total pack years: 80.00     Types: Cigarettes     Start date:      Quit date: 2017     Years since quittin.5     Passive exposure: Past    Smokeless tobacco: Never   Vaping Use    Vaping Use: Former    Substances: Nicotine    Devices: Refillable tank    Passive vaping exposure: Yes   Substance and Sexual Activity    Alcohol use: Not Currently     Comment: 36 yrs ago    Drug use: Never    Sexual activity: Not Currently        Past Medical History:   Diagnosis Date    Arthritis     CHB s/p cardiac pacemaker 10/25/2021    Chronic HFrEF (heart failure with reduced ejection fraction)  10/25/2021    10/25/21 echo: Estimated right ventricular systolic pressure from tricuspid regurgitation is markedly elevated (>55 mmHg). Mild LVH. Estimated left ventricular EF = 45% Left ventricular systolic function is mildly decreased.      GERD (gastroesophageal reflux disease)     Glaucoma     LEFT EYE     Hypertension     Osteoporosis         Immunization History   Administered Date(s) Administered    COVID-19 (MODERNA) 1st,2nd,3rd Dose Monovalent 05/03/2021, 06/03/2021, 12/17/2021    Fluzone High Dose =>65 Years ([employer] ONLY) 10/08/2020    Fluzone High-Dose 65+yrs 02/09/2022, 11/29/2022, 10/17/2023    INFLUENZA SPLIT TRI 12/07/2020    Pneumococcal Conjugate 20-Valent (PCV20) 10/17/2023       No Known Allergies       Current Outpatient Medications:     alendronate (FOSAMAX) 70 MG tablet, Take 1 tablet by mouth Every 7 (Seven) Days., Disp: , Rfl:     brimonidine (ALPHAGAN) 0.2 % ophthalmic solution, Administer 1 drop to both eyes 2 (two) times a day., Disp: , Rfl:     cholecalciferol (VITAMIN D3) 25 MCG (1000 UT) tablet, Take 1.25 Units by mouth Take As Directed. Every 5 days, Disp: , Rfl:     dicyclomine (BENTYL) 20 MG tablet, Take 1 tablet by mouth Every 6 (Six) Hours., Disp: 90 tablet, Rfl: 3    dorzolamide-timolol (COSOPT) 22.3-6.8 MG/ML ophthalmic solution, Administer 1 drop to both eyes 2 (Two) Times a Day., Disp: , Rfl:     ergocalciferol (ERGOCALCIFEROL) 1.25 MG (84687 UT) capsule, Take 1 capsule by mouth Take As Directed. Pt takes every 5 days, Disp: , Rfl:     hyoscyamine (ANASPAZ,LEVSIN) 0.125 MG tablet, Take 1 tablet by mouth As Needed for Cramping., Disp: , Rfl:     latanoprost (XALATAN) 0.005 % ophthalmic solution, Administer 1 drop to both eyes Every Night., Disp: , Rfl:     lisinopril (PRINIVIL,ZESTRIL) 10 MG tablet, Take 1 tablet by mouth Daily., Disp: 30 tablet, Rfl: 11    pantoprazole (PROTONIX) 40 MG EC tablet, Take 1 tablet by mouth once daily, Disp: 90 tablet, Rfl: 1     Objective     Physical Exam  Vital Signs:   WDWN, Alert, NAD.    HEENT:  PERRL, EOMI.  OP, nares clear, no sinus tenderness  Neck:  Supple, no JVD, no thyromegaly.  Lymph: no axillary, cervical, supraclavicular lymphadenopathy noted bilaterally  Chest:  good aeration, clear to auscultation bilaterally, tympanic to percussion  "bilaterally, no work of breathing noted  CV: RRR, no MGR, pulses 2+, equal.  Abd:  Soft, NT, ND, + BS, no HSM  EXT:  no clubbing, no cyanosis, no edema, no joint tenderness  Neuro:  A&Ox3, CN grossly intact, no focal deficits.  Skin: No rashes or lesions noted.    /62 (BP Location: Left arm, Patient Position: Sitting, Cuff Size: Large Adult)   Pulse 77   Resp 16   Ht 167.6 cm (65.98\")   Wt 91 kg (200 lb 11.2 oz)   SpO2 97% Comment: room air  BMI 32.41 kg/m²         Result Review :   I have reviewed Dr. Pham's last office visit note. I also reviewed chest CT report dated from 10/6/2023. See scanned report.    Procedures:           Assessment and Plan      Assessment:  1.  COPD.  Patient declines all inhalers.  2.  Lung nodule.  Stable on recent chest CT scan completed on 10/6/2023.  3.  Dyspnea, improved per pt report.  4.  Snoring/Upper airway resistance.  5.  Cardiac arrhythmia status post pacemaker placement.  Patient is under the care of Dr. Fiore.  6.  Tobacco abuse of cigarettes in remission.  Will enroll patient in lung cancer screening.  7. Encounter for lung cancer screening.  8. Encounter for immunization.  Flu vaccination and pneumococcal 20 vaccination given to the patient in the office today.        Plan:  1.  Patient continues to decline all inhalers that she states that they did not help.  COPD action plan discussed with the patient in the office today.  2.  Recent chest CT scan completed on 10/6/2023 report states stable 6 mm noncalcified right upper lobe nodule.  This is likely benign given 2 year stability.  3.  Vaccination status: Flu vaccination and pneumococcal 20 vaccination given to the patient in the office today.  Patient reports they are up-to-date with Covid vaccines.  Patient is advised to continue to follow CDC recommendations such as social distancing wearing a mask and washing hands for at least 20 seconds.  4.  Smoking status: former cigarette smoker.   5. We will enroll " patient in lung cancer screening today.  Shared decision making regarding lung cancer screening was performed in the office today. The patient is understands this is a process and further imaging or invasive procedures may be needed.  The patient verbalized understanding.  Please see my risks versus benefits part of this note for further details. Patient is amendable to screening and I have placed low dose chest CT order for  lung cancer screening.    Patient was presented with the benefits and harms of screening to include:         The possible need for follow-up diagnostic testing         Over Diagnosis         False Positive Rate         Total Radiation Exposure            * Counseled on the importance of:         Adherence to annual lung cancer LDCT screening         Impact of co-morbidities         The ability or willingness to undergo diagnosis of treatment               * Counseled on the importance of cigarette cessation.      * Counseled on the importance of maintaining cigarette smoking abstinence.        We discussed the NCCN guidelines for lung cancer screening and the patient verbalized understanding that annual screening is recommended until fifteen years beyond smoking as long as they have no other disease or comorbidity that would prevent them from receiving cancer treatments such as surgery should a lung cancer be detected.  After review of the NCCN guidelines and recommendations for ongoing screening, the patient verbalized understanding of recommendations for follow-up.  The patient has decided to proceed with a Low Dose Lung Cancer Screening CT today.       4 minutes face-to-face spent counseling patient on the continued health benefits of having quit tobacco, maintaining smoking abstinence, smoking cessation strategies and resources, as well as the importance of adherence to annual lung cancer low-dose CT screening.  6.  Follow-up with cardiologist as scheduled.  7.  Patient to call the office,  911, or go to the ER with new or worsening symptoms.  8.  Follow-up in 6 months, sooner if needed.              Follow Up   Return in about 6 months (around 4/17/2024).  Patient was given instructions and counseling regarding her condition or for health maintenance advice. Please see specific information pulled into the AVS if appropriate.

## 2023-10-17 ENCOUNTER — OFFICE VISIT (OUTPATIENT)
Dept: PULMONOLOGY | Facility: CLINIC | Age: 68
End: 2023-10-17
Payer: MEDICARE

## 2023-10-17 VITALS
HEART RATE: 77 BPM | WEIGHT: 200.7 LBS | HEIGHT: 66 IN | RESPIRATION RATE: 16 BRPM | SYSTOLIC BLOOD PRESSURE: 114 MMHG | OXYGEN SATURATION: 97 % | BODY MASS INDEX: 32.26 KG/M2 | DIASTOLIC BLOOD PRESSURE: 62 MMHG

## 2023-10-17 DIAGNOSIS — F17.201 TOBACCO ABUSE, IN REMISSION: ICD-10-CM

## 2023-10-17 DIAGNOSIS — Z12.2 ENCOUNTER FOR SCREENING FOR LUNG CANCER: ICD-10-CM

## 2023-10-17 DIAGNOSIS — Z23 FLU VACCINE NEED: ICD-10-CM

## 2023-10-17 DIAGNOSIS — G47.8 UPPER AIRWAY RESISTANCE SYNDROME: ICD-10-CM

## 2023-10-17 DIAGNOSIS — F17.211 CIGARETTE NICOTINE DEPENDENCE IN REMISSION: ICD-10-CM

## 2023-10-17 DIAGNOSIS — R91.1 LUNG NODULE: ICD-10-CM

## 2023-10-17 DIAGNOSIS — R06.83 SNORING: Primary | ICD-10-CM

## 2023-10-17 DIAGNOSIS — Z23 ENCOUNTER FOR IMMUNIZATION: ICD-10-CM

## 2023-10-17 DIAGNOSIS — R06.00 DYSPNEA, UNSPECIFIED TYPE: ICD-10-CM

## 2023-12-26 ENCOUNTER — HOSPITAL ENCOUNTER (OUTPATIENT)
Dept: MAMMOGRAPHY | Facility: HOSPITAL | Age: 68
Discharge: HOME | End: 2023-12-26
Payer: MEDICARE

## 2023-12-26 DIAGNOSIS — Z12.31 VISIT FOR SCREENING MAMMOGRAM: ICD-10-CM

## 2023-12-26 PROCEDURE — 77063 BREAST TOMOSYNTHESIS BI: CPT

## 2023-12-26 PROCEDURE — 77067 SCR MAMMO BI INCL CAD: CPT

## 2023-12-27 ENCOUNTER — CLINICAL SUPPORT (OUTPATIENT)
Dept: GASTROENTEROLOGY | Facility: CLINIC | Age: 68
End: 2023-12-27
Payer: MEDICARE

## 2023-12-27 ENCOUNTER — PREP FOR SURGERY (OUTPATIENT)
Dept: OTHER | Facility: HOSPITAL | Age: 68
End: 2023-12-27
Payer: MEDICARE

## 2023-12-27 DIAGNOSIS — K22.70 BARRETT'S ESOPHAGUS WITHOUT DYSPLASIA: Primary | ICD-10-CM

## 2023-12-27 NOTE — PROGRESS NOTES
Emma GALLAGHER Schoenbaechler  1955  68 y.o.    Reason for call: Recall - 3 year recall barretts, last EGD 2021  Prep prescribed: N/A  Prep instructions reviewed with patient and sent to patient via [employer]  Is the patient currently on any injectable medications for weight loss or diabetes? No  Clearance needed? Yes  If yes, what clearance is needed? Cardiology and Pulmonary  Clearance has been requested from [facility]   The patient has been scheduled for: EGD  After your procedure, you will be contacted with results. Please confirm the best phone # to reach the patient: [phone]  Family history of colon cancer? No  If yes, indicate relative: NA  Family History   Problem Relation Age of Onset    Colon cancer Neg Hx      Past Medical History:   Diagnosis Date    Arthritis     Atrial tachycardia     Chowdary esophagus     Cardiac pacemaker     CHB (complete heart block)     Chronic HFrEF (heart failure with reduced ejection fraction)  10/25/2021    10/25/21 echo: Estimated right ventricular systolic pressure from tricuspid regurgitation is markedly elevated (>55 mmHg). Mild LVH. Estimated left ventricular EF = 45% Left ventricular systolic function is mildly decreased.      GERD (gastroesophageal reflux disease)     Glaucoma     LEFT EYE    Hypertension     Osteoporosis      No Known Allergies  Past Surgical History:   Procedure Laterality Date    CARDIAC CATHETERIZATION N/A 10/26/2021    Procedure: Left Heart Cath;  Surgeon: Calixto Fiore MD;  Location: [facility] CATH INVASIVE LOCATION;  Service: Cardiovascular;  Laterality: N/A;    CARDIAC ELECTROPHYSIOLOGY PROCEDURE N/A 10/26/2021    Procedure: Pacemaker SC new;  Surgeon: Calixto Fiore MD;  Location: [facility] CATH INVASIVE LOCATION;  Service: Cardiovascular;  Laterality: N/A;    CHOLECYSTECTOMY      COLONOSCOPY N/A 03/16/2023    Procedure: COLONOSCOPY WITH RANDOM COLON BIOPSIES;  Surgeon: Vito Maurer MD;  Location: [facility] ENDOSCOPY;  Service:  Gastroenterology;  Laterality: N/A;  NORMAL COLON, NORMAL TI    EYE SURGERY Left     INSERT / REPLACE / REMOVE PACEMAKER      10/26/2021    KNEE SURGERY Right     UPPER GASTROINTESTINAL ENDOSCOPY       Social History     Socioeconomic History    Marital status:    Tobacco Use    Smoking status: Former     Packs/day: 2.00     Years: 40.00     Additional pack years: 0.00     Total pack years: 80.00     Types: Cigarettes     Start date:      Quit date: 2017     Years since quittin.7     Passive exposure: Past    Smokeless tobacco: Never   Vaping Use    Vaping Use: Former    Substances: Nicotine    Devices: Refillable tank    Passive vaping exposure: Yes   Substance and Sexual Activity    Alcohol use: Not Currently     Comment: 36 yrs ago    Drug use: Never    Sexual activity: Not Currently       Current Outpatient Medications:     alendronate (FOSAMAX) 70 MG tablet, Take 1 tablet by mouth Every 7 (Seven) Days., Disp: , Rfl:     brimonidine (ALPHAGAN) 0.2 % ophthalmic solution, Administer 1 drop to both eyes 2 (two) times a day., Disp: , Rfl:     cholecalciferol (VITAMIN D3) 25 MCG (1000 UT) tablet, Take 1.25 Units by mouth Take As Directed. Every 5 days, Disp: , Rfl:     dicyclomine (BENTYL) 20 MG tablet, Take 1 tablet by mouth Every 6 (Six) Hours., Disp: 90 tablet, Rfl: 3    dorzolamide-timolol (COSOPT) 22.3-6.8 MG/ML ophthalmic solution, Administer 1 drop to both eyes 2 (Two) Times a Day., Disp: , Rfl:     ergocalciferol (ERGOCALCIFEROL) 1.25 MG (95329 UT) capsule, Take 1 capsule by mouth Take As Directed. Pt takes every 5 days, Disp: , Rfl:     hyoscyamine (ANASPAZ,LEVSIN) 0.125 MG tablet, Take 1 tablet by mouth As Needed for Cramping., Disp: , Rfl:     latanoprost (XALATAN) 0.005 % ophthalmic solution, Administer 1 drop to both eyes Every Night., Disp: , Rfl:     lisinopril (PRINIVIL,ZESTRIL) 10 MG tablet, Take 1 tablet by mouth Daily., Disp: 30 tablet, Rfl: 11    pantoprazole (PROTONIX) 40 MG EC  tablet, Take 1 tablet by mouth once daily, Disp: 90 tablet, Rfl: 1

## 2024-01-04 ENCOUNTER — OFFICE VISIT (OUTPATIENT)
Dept: CARDIOLOGY | Facility: CLINIC | Age: 69
End: 2024-01-04
Payer: MEDICARE

## 2024-01-04 VITALS
BODY MASS INDEX: 32.95 KG/M2 | WEIGHT: 205 LBS | SYSTOLIC BLOOD PRESSURE: 150 MMHG | HEART RATE: 64 BPM | HEIGHT: 66 IN | DIASTOLIC BLOOD PRESSURE: 76 MMHG

## 2024-01-04 DIAGNOSIS — I50.22 CHRONIC HFREF (HEART FAILURE WITH REDUCED EJECTION FRACTION): ICD-10-CM

## 2024-01-04 DIAGNOSIS — Z95.0 PRESENCE OF CARDIAC PACEMAKER: ICD-10-CM

## 2024-01-04 DIAGNOSIS — R07.2 CHEST PAIN, PRECORDIAL: Primary | ICD-10-CM

## 2024-01-04 DIAGNOSIS — I10 ESSENTIAL HYPERTENSION: ICD-10-CM

## 2024-01-04 RX ORDER — LISINOPRIL 20 MG/1
20 TABLET ORAL DAILY
Qty: 90 TABLET | Refills: 3 | Status: SHIPPED | OUTPATIENT
Start: 2024-01-04

## 2024-01-04 NOTE — ASSESSMENT & PLAN NOTE
Chest pressure remains low normal on recent echocardiogram we will go ahead and increase lisinopril to try to optimize blood pressure control

## 2024-01-04 NOTE — PROGRESS NOTES
Chief Complaint  Follow-up, CHB s/p cardiac pacemaker, and Hypertension    Subjective    Patient has been having some intermittent left-sided chest pressure lasting for minutes to hours with no specific aggravating relieving factors.  Past Medical History:   Diagnosis Date    Arthritis     Atrial tachycardia     Chowdary esophagus     Cardiac pacemaker     CHB (complete heart block)     Chronic HFrEF (heart failure with reduced ejection fraction)  10/25/2021    10/25/21 echo: Estimated right ventricular systolic pressure from tricuspid regurgitation is markedly elevated (>55 mmHg). Mild LVH. Estimated left ventricular EF = 45% Left ventricular systolic function is mildly decreased.      GERD (gastroesophageal reflux disease)     Glaucoma     LEFT EYE    Hypertension     Osteoporosis          Current Outpatient Medications:     brimonidine (ALPHAGAN) 0.2 % ophthalmic solution, Administer 1 drop to both eyes 2 (two) times a day., Disp: , Rfl:     cholecalciferol (VITAMIN D3) 25 MCG (1000 UT) tablet, Take 1.25 Units by mouth Take As Directed. Every 5 days, Disp: , Rfl:     dicyclomine (BENTYL) 20 MG tablet, Take 1 tablet by mouth Every 6 (Six) Hours., Disp: 90 tablet, Rfl: 3    dorzolamide-timolol (COSOPT) 22.3-6.8 MG/ML ophthalmic solution, Administer 1 drop to both eyes 2 (Two) Times a Day., Disp: , Rfl:     hyoscyamine (ANASPAZ,LEVSIN) 0.125 MG tablet, Take 1 tablet by mouth As Needed for Cramping., Disp: , Rfl:     latanoprost (XALATAN) 0.005 % ophthalmic solution, Administer 1 drop to both eyes Every Night., Disp: , Rfl:     pantoprazole (PROTONIX) 40 MG EC tablet, Take 1 tablet by mouth once daily, Disp: 90 tablet, Rfl: 1    lisinopril (PRINIVIL,ZESTRIL) 20 MG tablet, Take 1 tablet by mouth Daily., Disp: 90 tablet, Rfl: 3    Medications Discontinued During This Encounter   Medication Reason    alendronate (FOSAMAX) 70 MG tablet *Therapy completed    ergocalciferol (ERGOCALCIFEROL) 1.25 MG (79627 UT) capsule  "Alternate therapy    lisinopril (PRINIVIL,ZESTRIL) 10 MG tablet      No Known Allergies     Social History     Tobacco Use    Smoking status: Former     Packs/day: 2.00     Years: 40.00     Additional pack years: 0.00     Total pack years: 80.00     Types: Cigarettes     Start date:      Quit date: 2017     Years since quittin.7     Passive exposure: Past    Smokeless tobacco: Never   Vaping Use    Vaping Use: Former    Substances: Nicotine    Devices: Refillable tank    Passive vaping exposure: Yes   Substance Use Topics    Alcohol use: Not Currently     Comment: 36 yrs ago    Drug use: Never       Family History   Problem Relation Age of Onset    Colon cancer Neg Hx         Objective     /76   Pulse 64   Ht 167.6 cm (65.98\")   Wt 93 kg (205 lb)   BMI 33.11 kg/m²       Physical Exam    General Appearance:   no acute distress  Alert and oriented x3  HENT:   lips not cyanotic  Atraumatic  Neck:  No jvd   supple  Respiratory:  no respiratory distress  normal breath sounds  no rales  Cardiovascular:  Rate and rhythm  no S3, no S4   no murmur  no rub  Extremities  No cyanosis  lower extremity edema: none    Skin:   warm, dry  No rashes      Result Review :     proBNP   Date Value Ref Range Status   10/25/2021 2,605.0 (H) 0.0 - 900.0 pg/mL Final     CMP          2023    07:49 2023    09:24   CMP   Creatinine 0.84  0.93    EGFR 76.3  67.5    Calcium 8.9  9.2         Lab Results   Component Value Date    TSH 1.190 2019      No results found for: \"FREET4\"   No results found for: \"DDIMERQUANT\"  Magnesium   Date Value Ref Range Status   10/26/2021 1.9 1.6 - 2.4 mg/dL Final      No results found for: \"DIGOXIN\"   Lab Results   Component Value Date    TROPONINT 0.013 10/26/2021             Lab Results   Component Value Date    POCTROP 0.08 10/25/2021       Results for orders placed in visit on 22    Adult Transthoracic Echo Complete W/ Cont if Necessary Per Protocol    Interpretation " Summary  · Estimated left ventricular EF = 50% Left ventricular systolic function is low normal with area of apical septal hypokinesis  · Left ventricular wall thickness is consistent with mild concentric hypertrophy.  · The left ventricular cavity is mildly dilated.                 Diagnoses and all orders for this visit:    1. Chest pain, precordial (Primary)  Assessment & Plan:  Atypical in nature suspect likely GI or muscle skeletal we will get noninvasive stress test though to rule out ischemia    Orders:  -     Stress Test With Myocardial Perfusion One Day; Future    2. Essential hypertension  Assessment & Plan:  Increase lisinopril to 20 daily repeat BMP 2 weeks    Orders:  -     Basic Metabolic Panel; Future    3. Chronic HFrEF (heart failure with reduced ejection fraction)  Assessment & Plan:  Chest pressure remains low normal on recent echocardiogram we will go ahead and increase lisinopril to try to optimize blood pressure control    Orders:  -     proBNP; Future    4. CHB s/p cardiac pacemaker  Assessment & Plan:  Device working normally when interrogation in office check next visit      Other orders  -     lisinopril (PRINIVIL,ZESTRIL) 20 MG tablet; Take 1 tablet by mouth Daily.  Dispense: 90 tablet; Refill: 3            Follow Up     Return in about 6 months (around 7/4/2024) for Follow with Jeffery Curry w/f/u.          Patient was given instructions and counseling regarding her condition or for health maintenance advice. Please see specific information pulled into the AVS if appropriate.

## 2024-01-23 ENCOUNTER — TELEPHONE (OUTPATIENT)
Dept: GASTROENTEROLOGY | Facility: CLINIC | Age: 69
End: 2024-01-23
Payer: MEDICARE

## 2024-01-23 NOTE — TELEPHONE ENCOUNTER
2024    Dear Dr. Fiore,      Patient Name: Jana I Schoenbaechler  : 1955      This patient is waiting to have a Colonoscopy and/or Esophagogastroduodenoscopy which I will perform at [facility] on 3/12/2024. Please respond to this request noting your recommendations regarding clearance from a Cardiac  standpoint.  You may contact our office at [phone] Option 1 with any questions. I appreciate your prompt response in this matter. Please return this form to our office as soon as possible to [phone].    ____ I approve my patient from a Cardiac  standpoint    ____ I do NOT approve my patient from a Cardiac  standpoint at this time      Please specify clearance expiration date:____________________________________      Approving physician name (please print): _____________________________________________      Approving physician signature: ________________________________ Date:________________  Sincerely,  [facility] - Gastroenterology   Dr. Vito Maurer          Please fax approval or denial to our office as soon as possible.

## 2024-01-23 NOTE — TELEPHONE ENCOUNTER
2024    Dear KEILA Salguero,      Patient Name: Jana I Schoenbaechler  : 1955      This patient is waiting to have a Colonoscopy and/or Esophagogastroduodenoscopy which I will perform at [facility] on 3/12/2024 Please respond to this request noting your recommendations regarding clearance from a Pulmonary  standpoint.  You may contact our office at [phone] Option 1 with any questions. I appreciate your prompt response in this matter. Please return this form to our office as soon as possible to [phone].    ____ I approve my patient from a Pulmonary  standpoint    ____ I do NOT approve my patient from a Pulmonary  standpoint at this time      Please specify clearance expiration date:____________________________________      Approving physician name (please print): _____________________________________________      Approving physician signature: ________________________________ Date:________________  Sincerely,  [facility] - Gastroenterology   Dr. Vito Maurer          Please fax approval or denial to our office as soon as possible.

## 2024-02-08 ENCOUNTER — HOSPITAL ENCOUNTER (OUTPATIENT)
Dept: NUCLEAR MEDICINE | Facility: HOSPITAL | Age: 69
Discharge: HOME | End: 2024-02-08
Payer: MEDICARE

## 2024-02-08 DIAGNOSIS — R07.2 CHEST PAIN, PRECORDIAL: ICD-10-CM

## 2024-02-08 PROCEDURE — 78452 HT MUSCLE IMAGE SPECT MULT: CPT

## 2024-02-08 PROCEDURE — 0 TECHNETIUM TETROFOSMIN KIT: Performed by: INTERNAL MEDICINE

## 2024-02-08 PROCEDURE — 93017 CV STRESS TEST TRACING ONLY: CPT

## 2024-02-08 PROCEDURE — 25010000002 REGADENOSON 0.4 MG/5ML SOLUTION: Performed by: INTERNAL MEDICINE

## 2024-02-08 PROCEDURE — A9502 TC99M TETROFOSMIN: HCPCS | Performed by: INTERNAL MEDICINE

## 2024-02-08 RX ORDER — REGADENOSON 0.08 MG/ML
0.4 INJECTION, SOLUTION INTRAVENOUS
Status: COMPLETED | OUTPATIENT
Start: 2024-02-08 | End: 2024-02-08

## 2024-02-08 RX ADMIN — REGADENOSON 0.4 MG: 0.08 INJECTION, SOLUTION INTRAVENOUS at 08:28

## 2024-02-08 RX ADMIN — TETROFOSMIN 1 DOSE: 1.38 INJECTION, POWDER, LYOPHILIZED, FOR SOLUTION INTRAVENOUS at 08:28

## 2024-02-08 RX ADMIN — TETROFOSMIN 1 DOSE: 1.38 INJECTION, POWDER, LYOPHILIZED, FOR SOLUTION INTRAVENOUS at 06:46

## 2024-02-10 LAB
BH CV IMMEDIATE POST TECH DATA BLOOD PRESSURE: NORMAL MMHG
BH CV IMMEDIATE POST TECH DATA HEART RATE: 99 BPM
BH CV IMMEDIATE POST TECH DATA OXYGEN SATS: 97 %
BH CV REST NUCLEAR ISOTOPE DOSE: 9.7 MCI
BH CV SIX MINUTE RECOVERY TECH DATA BLOOD PRESSURE: NORMAL
BH CV SIX MINUTE RECOVERY TECH DATA HEART RATE: 82 BPM
BH CV SIX MINUTE RECOVERY TECH DATA OXYGEN SATURATION: 100 %
BH CV STRESS BP STAGE 1: NORMAL
BH CV STRESS COMMENTS STAGE 1: NORMAL
BH CV STRESS DOSE REGADENOSON STAGE 1: 0.4
BH CV STRESS DURATION MIN STAGE 1: 0
BH CV STRESS DURATION SEC STAGE 1: 10
BH CV STRESS HR STAGE 1: 89
BH CV STRESS NUCLEAR ISOTOPE DOSE: 36.8 MCI
BH CV STRESS O2 STAGE 1: 99
BH CV STRESS PROTOCOL 1: NORMAL
BH CV STRESS RECOVERY BP: NORMAL MMHG
BH CV STRESS RECOVERY HR: 82 BPM
BH CV STRESS RECOVERY O2: 100 %
BH CV STRESS STAGE 1: 1
BH CV THREE MINUTE POST TECH DATA BLOOD PRESSURE: NORMAL MMHG
BH CV THREE MINUTE POST TECH DATA HEART RATE: 87 BPM
BH CV THREE MINUTE POST TECH DATA OXYGEN SATURATION: 99 %
LV EF NUC BP: 47 %
MAXIMAL PREDICTED HEART RATE: 152 BPM
PERCENT MAX PREDICTED HR: 65.13 %
STRESS BASELINE BP: NORMAL MMHG
STRESS BASELINE HR: 64 BPM
STRESS O2 SAT REST: 96 %
STRESS PERCENT HR: 77 %
STRESS POST O2 SAT PEAK: 99 %
STRESS POST PEAK BP: NORMAL MMHG
STRESS POST PEAK HR: 99 BPM
STRESS TARGET HR: 129 BPM

## 2024-02-12 ENCOUNTER — TELEPHONE (OUTPATIENT)
Dept: CARDIOLOGY | Facility: CLINIC | Age: 69
End: 2024-02-12
Payer: MEDICARE

## 2024-02-21 DIAGNOSIS — K22.719 BARRETT'S ESOPHAGUS WITH DYSPLASIA: ICD-10-CM

## 2024-02-22 RX ORDER — PANTOPRAZOLE SODIUM 40 MG/1
TABLET, DELAYED RELEASE ORAL
Qty: 90 TABLET | Refills: 1 | Status: SHIPPED | OUTPATIENT
Start: 2024-02-22

## 2024-03-04 NOTE — PRE-PROCEDURE INSTRUCTIONS
"Instructed on date and arrival time of 0600. Come to entrance \"C\".  Must have  over age 18 to drive home.  May have two visitors; however, children under 12 must stay in waiting room.  Discussed diet/NPO.  May take medications as usual except for blood thinners, diabetic medications, or weight loss medications.  Bring list of medications to hospital.  Verbalized understanding of instructions given.  Instructed to call for questions or concerns.  Cardiac and pulmonary clearances noted in chart.  "

## 2024-03-11 ENCOUNTER — ANESTHESIA EVENT (OUTPATIENT)
Dept: GASTROENTEROLOGY | Facility: HOSPITAL | Age: 69
End: 2024-03-11
Payer: MEDICARE

## 2024-03-11 NOTE — ANESTHESIA PREPROCEDURE EVALUATION
Anesthesia Evaluation     NPO Solid Status: > 8 hours  NPO Liquid Status: > 8 hours           Airway   Mallampati: II  TM distance: >3 FB  Neck ROM: full  Large neck circumference and No difficulty expected  Dental    (+) poor dentition        Pulmonary - normal exam   Cardiovascular - normal exam    ECG reviewed    (+) pacemaker pacemaker interrogated 1-3 months ago, hypertension less than 2 medications, dysrhythmias (Complete heart block) Tachycardia      Neuro/Psych    ROS Comment: Glaucoma  GI/Hepatic/Renal/Endo    (+) obesity, hiatal hernia, GERD well controlled    ROS Comment: Chowdary's Esophagus    Musculoskeletal     Abdominal   (+) obese   Substance History      OB/GYN          Other   arthritis,     ROS/Med Hx Other: Cardiac Clearance from Dr. Fiore- moderate risk    ECHO 5-2-22: EF 50%  -The aortic valve is grossly normal in structure.  -The mitral valve is grossly normal in structure. Mild mitral valve regurgitation is present.  -Tricuspid valve not well visualized.  -The pulmonic valve is not well visualized.    EKG 10-27-21: Dual paced rhythm d/t complete HB    Cardiac Cath 10/26/21:  Indication - CHF Elevated troponin  Conclusions - No significant angiographic coronary arthroscopic disease with moderate reduced ejection fraction and LV enlargement most consistent with a nonischemic cardiomyopathy even though the wall motion does appear to be more focally hypokinetic in the anterior myocardium    2/8/24- Stress test   Myocardial perfusion imaging indicates a normal myocardial perfusion study with no evidence of ischemia. Impressions are consistent with a low risk study.  ·  Left ventricular ejection fraction is mildly reduced (Calculated EF = 47%).  ·  Findings consistent with a normal ECG stress test.                         Anesthesia Plan    ASA 3     general   total IV anesthesia  (Total IV Anesthesia    Patient understands anesthesia not responsible for dental damage.  )  intravenous induction      Anesthetic plan, risks, benefits, and alternatives have been provided, discussed and informed consent has been obtained with: patient.    Plan discussed with CRNA.      CODE STATUS:

## 2024-03-12 ENCOUNTER — HOSPITAL ENCOUNTER (OUTPATIENT)
Facility: HOSPITAL | Age: 69
Setting detail: HOSPITAL OUTPATIENT SURGERY
Discharge: HOME | End: 2024-03-12
Attending: INTERNAL MEDICINE | Admitting: INTERNAL MEDICINE
Payer: MEDICARE

## 2024-03-12 ENCOUNTER — ANESTHESIA (OUTPATIENT)
Dept: GASTROENTEROLOGY | Facility: HOSPITAL | Age: 69
End: 2024-03-12
Payer: MEDICARE

## 2024-03-12 VITALS
BODY MASS INDEX: 32.76 KG/M2 | DIASTOLIC BLOOD PRESSURE: 77 MMHG | SYSTOLIC BLOOD PRESSURE: 150 MMHG | WEIGHT: 202.82 LBS | RESPIRATION RATE: 20 BRPM | OXYGEN SATURATION: 99 % | TEMPERATURE: 97.4 F | HEART RATE: 69 BPM

## 2024-03-12 DIAGNOSIS — K22.70 BARRETT'S ESOPHAGUS WITHOUT DYSPLASIA: ICD-10-CM

## 2024-03-12 PROCEDURE — 25010000002 PROPOFOL 500 MG/50ML EMULSION

## 2024-03-12 PROCEDURE — 25810000003 LACTATED RINGERS PER 1000 ML

## 2024-03-12 PROCEDURE — 88305 TISSUE EXAM BY PATHOLOGIST: CPT | Performed by: INTERNAL MEDICINE

## 2024-03-12 PROCEDURE — 25010000002 PROPOFOL 10 MG/ML EMULSION

## 2024-03-12 RX ORDER — LIDOCAINE HYDROCHLORIDE 20 MG/ML
INJECTION, SOLUTION EPIDURAL; INFILTRATION; INTRACAUDAL; PERINEURAL AS NEEDED
Status: DISCONTINUED | OUTPATIENT
Start: 2024-03-12 | End: 2024-03-12 | Stop reason: SURG

## 2024-03-12 RX ORDER — SODIUM CHLORIDE, SODIUM LACTATE, POTASSIUM CHLORIDE, CALCIUM CHLORIDE 600; 310; 30; 20 MG/100ML; MG/100ML; MG/100ML; MG/100ML
30 INJECTION, SOLUTION INTRAVENOUS CONTINUOUS
Status: DISCONTINUED | OUTPATIENT
Start: 2024-03-12 | End: 2024-03-12 | Stop reason: HOSPADM

## 2024-03-12 RX ORDER — PROPOFOL 10 MG/ML
VIAL (ML) INTRAVENOUS AS NEEDED
Status: DISCONTINUED | OUTPATIENT
Start: 2024-03-12 | End: 2024-03-12 | Stop reason: SURG

## 2024-03-12 RX ORDER — PROPOFOL 10 MG/ML
INJECTION, EMULSION INTRAVENOUS CONTINUOUS PRN
Status: DISCONTINUED | OUTPATIENT
Start: 2024-03-12 | End: 2024-03-12 | Stop reason: SURG

## 2024-03-12 RX ADMIN — LIDOCAINE HYDROCHLORIDE 100 MG: 20 INJECTION, SOLUTION EPIDURAL; INFILTRATION; INTRACAUDAL; PERINEURAL at 08:00

## 2024-03-12 RX ADMIN — PROPOFOL 150 MCG/KG/MIN: 10 INJECTION, EMULSION INTRAVENOUS at 07:50

## 2024-03-12 RX ADMIN — SODIUM CHLORIDE, POTASSIUM CHLORIDE, SODIUM LACTATE AND CALCIUM CHLORIDE 30 ML/HR: 600; 310; 30; 20 INJECTION, SOLUTION INTRAVENOUS at 06:39

## 2024-03-12 RX ADMIN — SODIUM CHLORIDE, POTASSIUM CHLORIDE, SODIUM LACTATE AND CALCIUM CHLORIDE: 600; 310; 30; 20 INJECTION, SOLUTION INTRAVENOUS at 07:45

## 2024-03-12 RX ADMIN — PROPOFOL 50 MG: 10 INJECTION, EMULSION INTRAVENOUS at 07:49

## 2024-03-12 NOTE — H&P
Pre Procedure History & Physical    Chief Complaint:   Chowdary's  gerd    Subjective     HPI:   As above    Past Medical History:   Past Medical History:   Diagnosis Date    Arthritis     Atrial tachycardia     Chowdary esophagus     Cardiac pacemaker     CHB (complete heart block)     Chronic HFrEF (heart failure with reduced ejection fraction)  10/25/2021    10/25/21 echo: Estimated right ventricular systolic pressure from tricuspid regurgitation is markedly elevated (>55 mmHg). Mild LVH. Estimated left ventricular EF = 45% Left ventricular systolic function is mildly decreased.      GERD (gastroesophageal reflux disease)     Glaucoma     LEFT EYE    Hypertension     Osteoporosis        Past Surgical History:  Past Surgical History:   Procedure Laterality Date    CARDIAC CATHETERIZATION N/A 10/26/2021    Procedure: Left Heart Cath;  Surgeon: Calixto Fiore MD;  Location: [facility] CATH INVASIVE LOCATION;  Service: Cardiovascular;  Laterality: N/A;    CARDIAC ELECTROPHYSIOLOGY PROCEDURE N/A 10/26/2021    Procedure: Pacemaker SC new;  Surgeon: Calixto Fiore MD;  Location: [facility] CATH INVASIVE LOCATION;  Service: Cardiovascular;  Laterality: N/A;    CHOLECYSTECTOMY      COLONOSCOPY N/A 03/16/2023    Procedure: COLONOSCOPY WITH RANDOM COLON BIOPSIES;  Surgeon: Vito Maurer MD;  Location: [facility] ENDOSCOPY;  Service: Gastroenterology;  Laterality: N/A;  NORMAL COLON, NORMAL TI    EYE SURGERY Left     INSERT / REPLACE / REMOVE PACEMAKER      10/26/2021    KNEE SURGERY Right     UPPER GASTROINTESTINAL ENDOSCOPY         Family History:  Family History   Problem Relation Age of Onset    Colon cancer Neg Hx        Social History:   reports that she quit smoking about 6 years ago. Her smoking use included cigarettes. She started smoking about 47 years ago. She has a 80.5 pack-year smoking history. She has been exposed to tobacco smoke. She has never used smokeless tobacco. She reports that she does not currently use  alcohol. She reports that she does not use drugs.    Medications:   Medications Prior to Admission   Medication Sig Dispense Refill Last Dose    brimonidine (ALPHAGAN) 0.2 % ophthalmic solution Administer 1 drop to both eyes 2 (two) times a day.   3/11/2024    cholecalciferol (VITAMIN D3) 25 MCG (1000 UT) tablet Take 1.25 Units by mouth Take As Directed. Every 5 days   3/11/2024    dicyclomine (BENTYL) 20 MG tablet Take 1 tablet by mouth Every 6 (Six) Hours. 90 tablet 3 Past Week    dorzolamide-timolol (COSOPT) 22.3-6.8 MG/ML ophthalmic solution Administer 1 drop to both eyes 2 (Two) Times a Day.   3/11/2024    hyoscyamine (ANASPAZ,LEVSIN) 0.125 MG tablet Take 1 tablet by mouth As Needed for Cramping.   Past Month    latanoprost (XALATAN) 0.005 % ophthalmic solution Administer 1 drop to both eyes Every Night.   3/11/2024    lisinopril (PRINIVIL,ZESTRIL) 20 MG tablet Take 1 tablet by mouth Daily. 90 tablet 3 3/11/2024    pantoprazole (PROTONIX) 40 MG EC tablet Take 1 tablet by mouth once daily 90 tablet 1 3/11/2024       Allergies:  Patient has no known allergies.        Objective     Blood pressure 152/78, pulse 81, temperature 97.2 °F (36.2 °C), temperature source Temporal, resp. rate 16, weight 92 kg (202 lb 13.2 oz), SpO2 99%, not currently breastfeeding.    Physical Exam   Constitutional: Pt is oriented to person, place, and time and well-developed, well-nourished, and in no distress.   Mouth/Throat: Oropharynx is clear and moist.   Neck: Normal range of motion.   Cardiovascular: Normal rate, regular rhythm and normal heart sounds.    Pulmonary/Chest: Effort normal and breath sounds normal.   Abdominal: Soft. Nontender  Skin: Skin is warm and dry.   Psychiatric: Mood, memory, affect and judgment normal.     Assessment & Plan     Diagnosis:  Gerd  duque's    Anticipated Surgical Procedure:  egd    The risks, benefits, and alternatives of this procedure have been discussed with the patient or the responsible  party- the patient understands and agrees to proceed.

## 2024-03-12 NOTE — ANESTHESIA POSTPROCEDURE EVALUATION
Patient: Jana I Schoenbaechler    Procedure Summary       Date: 03/12/24 Room / Location: [facility] ENDOSCOPY 2 / [facility] ENDOSCOPY    Anesthesia Start: 0745 Anesthesia Stop: 0809    Procedure: ESOPHAGOGASTRODUODENOSCOPY WITH BIOPSIES Diagnosis:       Chowdary's esophagus without dysplasia      (Chowdary's esophagus without dysplasia [K22.70])    Surgeons: Vito Maurer MD Provider: Tyler Epperson CRNA    Anesthesia Type: general ASA Status: 3            Anesthesia Type: general    Vitals  Vitals Value Taken Time   /77 03/12/24 0824   Temp 36.3 °C (97.4 °F) 03/12/24 0823   Pulse 88 03/12/24 0824   Resp 20 03/12/24 0823   SpO2 100 % 03/12/24 0824   Vitals shown include unfiled device data.        Post Anesthesia Care and Evaluation    Post-procedure mental status: acceptable.  Pain management: satisfactory to patient    Airway patency: patent  Anesthetic complications: No anesthetic complications    Cardiovascular status: acceptable  Respiratory status: acceptable    Comments: Per chart review

## 2024-04-07 NOTE — PROGRESS NOTES
Primary Care Provider  Rosa Isela Ortiz APRN     Referring Provider  No ref. provider found     Chief Complaint  Lung Nodule and Follow-up (6 month follow up. )    Subjective          History of Presenting Illness  Patient is a 68-year-old female, patient of Dr. Mcintyre who presents for management of dyspnea and lung nodule who presents for a follow-up visit today.  Patient states that since last visit her breathing is doing well.  Patient states that time she does get short of breath that is worse with heavy exertion and when walking upstairs, mild in severity, and improved with rest.  Patient currently denies any coughing or wheezing. Patient states that she has tried inhalers in the past did not help and therefore does not want any inhalers.   Patient denies fever, chills, night sweats, swollen glands in the head and neck, unintentional weight loss, hemoptysis, purulent sputum production, dysphagia, chest pain, palpitations, chest tightness, abdominal pain, nausea, vomiting, and diarrhea.  Patient also denies any myalgias, changes in sense of taste and/or smell, sore throat, any other coronavirus or flu-like symptoms.  Patient denies any leg swelling, orthopnea, paroxysmal nocturnal dyspnea.  Patient is able to perform activities of daily living.        Review of Systems     Family History   Problem Relation Age of Onset    Colon cancer Neg Hx         Social History     Socioeconomic History    Marital status:    Tobacco Use    Smoking status: Former     Current packs/day: 0.00     Average packs/day: 2.0 packs/day for 40.2 years (80.5 ttl pk-yrs)     Types: Cigarettes     Start date:      Quit date: 2017     Years since quittin.0     Passive exposure: Past    Smokeless tobacco: Never   Vaping Use    Vaping status: Former    Substances: Nicotine    Devices: Refillable tank    Passive vaping exposure: Yes   Substance and Sexual Activity    Alcohol use: Not Currently     Comment: 36 yrs ago     Drug use: Never    Sexual activity: Not Currently        Past Medical History:   Diagnosis Date    Arthritis     Atrial tachycardia     Chowdary esophagus     Cardiac pacemaker     CHB (complete heart block)     Chronic HFrEF (heart failure with reduced ejection fraction)  10/25/2021    10/25/21 echo: Estimated right ventricular systolic pressure from tricuspid regurgitation is markedly elevated (>55 mmHg). Mild LVH. Estimated left ventricular EF = 45% Left ventricular systolic function is mildly decreased.      GERD (gastroesophageal reflux disease)     Glaucoma     LEFT EYE    Hypertension     Osteoporosis         Immunization History   Administered Date(s) Administered    COVID-19 (MODERNA) 1st,2nd,3rd Dose Monovalent 05/03/2021, 06/03/2021, 12/17/2021    Fluzone High Dose =>65 Years ([employer] ONLY) 10/08/2020    Fluzone High-Dose 65+yrs 02/09/2022, 11/29/2022, 10/17/2023    INFLUENZA SPLIT TRI 12/07/2020    Pneumococcal Conjugate 20-Valent (PCV20) 10/17/2023       No Known Allergies       Current Outpatient Medications:     brimonidine (ALPHAGAN) 0.2 % ophthalmic solution, Administer 1 drop to both eyes 2 (two) times a day., Disp: , Rfl:     cholecalciferol (VITAMIN D3) 25 MCG (1000 UT) tablet, Take 1.25 Units by mouth Take As Directed. Every 5 days, Disp: , Rfl:     dorzolamide-timolol (COSOPT) 22.3-6.8 MG/ML ophthalmic solution, Administer 1 drop to both eyes 2 (Two) Times a Day., Disp: , Rfl:     hyoscyamine (LEVSIN) 0.125 MG SL tablet, Place 1 tablet under the tongue Every 4 (Four) Hours As Needed for Cramping., Disp: 120 tablet, Rfl: 5    latanoprost (XALATAN) 0.005 % ophthalmic solution, Administer 1 drop to both eyes Every Night., Disp: , Rfl:     lisinopril (PRINIVIL,ZESTRIL) 20 MG tablet, Take 1 tablet by mouth Daily., Disp: 90 tablet, Rfl: 3    pantoprazole (PROTONIX) 40 MG EC tablet, Take 1 tablet by mouth once daily, Disp: 90 tablet, Rfl: 1    dicyclomine (BENTYL) 20 MG tablet, Take 1 tablet by mouth  "Every 6 (Six) Hours. (Patient not taking: Reported on 4/17/2024), Disp: 90 tablet, Rfl: 3    hyoscyamine (ANASPAZ,LEVSIN) 0.125 MG tablet, Take 1 tablet by mouth As Needed for Cramping. (Patient not taking: Reported on 4/17/2024), Disp: , Rfl:      Objective     Physical Exam  Vital Signs:   WDWN, Alert, NAD.    HEENT:  PERRL, EOMI.  OP, nares clear, no sinus tenderness  Neck:  Supple, no JVD, no thyromegaly.  Lymph: no axillary, cervical, supraclavicular lymphadenopathy noted bilaterally  Chest:  good aeration, clear to auscultation bilaterally, tympanic to percussion bilaterally, no work of breathing noted  CV: RRR, no MGR, pulses 2+, equal.  Abd:  Soft, NT, ND, + BS, no HSM  EXT:  no clubbing, no cyanosis, no edema, no joint tenderness  Neuro:  A&Ox3, CN grossly intact, no focal deficits.  Skin: No rashes or lesions noted.    /67 (BP Location: Left arm, Patient Position: Sitting, Cuff Size: Large Adult)   Pulse 64   Temp 97.6 °F (36.4 °C)   Resp 16   Ht 167.6 cm (65.98\")   Wt 94.4 kg (208 lb 3.2 oz)   SpO2 98% Comment: room air  BMI 33.62 kg/m²         Result Review :   I have reviewed my  last office visit note.    Procedures:                  Assessment and Plan      Assessment:  1.  COPD.  Patient declines all inhalers.  2.  Lung nodule.  Stable on recent chest CT scan completed on 10/6/2023.  3.  Dyspnea.  4.  Upper airway resistance.  Currently not on any treatment.  Patient uses saline rinse at times.  5.  Cardiac arrhythmia status post pacemaker placement.  Patient is under the care of Dr. Fiore.  6.  Tobacco abuse of cigarettes in remission.  Patient is enrolled in lung cancer screening.        Plan:  1.  Patient continues to decline all inhalers that she states that they did not help.  COPD action plan discussed with the patient in the office today.  2.  Patient will be due for low-dose chest CT scan in October 2024.  Order has already been placed.  3.  Follow-up with cardiologist as " scheduled.  4.  Vaccination status: patient reports they are up-to-date with flu, pneumonia, and Covid vaccines.  Patient is advised to continue to follow CDC recommendations such as social distancing wearing a mask and washing hands for at least 20 seconds.  5.  Smoking status: former cigarette smo ker.  Patient is enrolled in lung cancer screening.  6.  Patient to call the office, 911, or go to the ER with new or worsening symptoms.  7.  Follow-up in November 2024, sooner if needed.              Follow Up   Return for November 2024.  Patient was given instructions and counseling regarding her condition or for health maintenance advice. Please see specific information pulled into the AVS if appropriate.

## 2024-04-17 ENCOUNTER — OFFICE VISIT (OUTPATIENT)
Dept: PULMONOLOGY | Facility: CLINIC | Age: 69
End: 2024-04-17
Payer: MEDICARE

## 2024-04-17 VITALS
RESPIRATION RATE: 16 BRPM | SYSTOLIC BLOOD PRESSURE: 129 MMHG | TEMPERATURE: 97.6 F | HEIGHT: 66 IN | BODY MASS INDEX: 33.46 KG/M2 | DIASTOLIC BLOOD PRESSURE: 67 MMHG | OXYGEN SATURATION: 98 % | HEART RATE: 64 BPM | WEIGHT: 208.2 LBS

## 2024-04-17 DIAGNOSIS — F17.201 TOBACCO ABUSE, IN REMISSION: ICD-10-CM

## 2024-04-17 DIAGNOSIS — R91.1 LUNG NODULE: Primary | ICD-10-CM

## 2024-04-17 DIAGNOSIS — G47.8 UPPER AIRWAY RESISTANCE SYNDROME: ICD-10-CM

## 2024-04-17 DIAGNOSIS — J44.9 CHRONIC OBSTRUCTIVE PULMONARY DISEASE, UNSPECIFIED COPD TYPE: ICD-10-CM

## 2024-04-17 DIAGNOSIS — R06.00 DYSPNEA, UNSPECIFIED TYPE: ICD-10-CM

## 2024-04-17 PROCEDURE — 1159F MED LIST DOCD IN RCRD: CPT | Performed by: NURSE PRACTITIONER

## 2024-04-17 PROCEDURE — 3074F SYST BP LT 130 MM HG: CPT | Performed by: NURSE PRACTITIONER

## 2024-04-17 PROCEDURE — 99213 OFFICE O/P EST LOW 20 MIN: CPT | Performed by: NURSE PRACTITIONER

## 2024-04-17 PROCEDURE — 3078F DIAST BP <80 MM HG: CPT | Performed by: NURSE PRACTITIONER

## 2024-04-17 PROCEDURE — 1160F RVW MEDS BY RX/DR IN RCRD: CPT | Performed by: NURSE PRACTITIONER

## 2024-06-03 ENCOUNTER — TRANSCRIBE ORDERS (OUTPATIENT)
Dept: GENERAL RADIOLOGY | Facility: HOSPITAL | Age: 69
End: 2024-06-03
Payer: MEDICARE

## 2024-06-03 ENCOUNTER — HOSPITAL ENCOUNTER (OUTPATIENT)
Dept: GENERAL RADIOLOGY | Facility: HOSPITAL | Age: 69
Discharge: HOME | End: 2024-06-03
Payer: MEDICARE

## 2024-06-03 DIAGNOSIS — M25.562 LEFT KNEE PAIN, UNSPECIFIED CHRONICITY: ICD-10-CM

## 2024-06-03 DIAGNOSIS — M25.562 LEFT KNEE PAIN, UNSPECIFIED CHRONICITY: Primary | ICD-10-CM

## 2024-06-03 PROCEDURE — 73562 X-RAY EXAM OF KNEE 3: CPT

## 2024-07-05 ENCOUNTER — OFFICE VISIT (OUTPATIENT)
Dept: ORTHOPEDIC SURGERY | Facility: CLINIC | Age: 69
End: 2024-07-05
Payer: MEDICARE

## 2024-07-05 VITALS
HEART RATE: 62 BPM | OXYGEN SATURATION: 96 % | DIASTOLIC BLOOD PRESSURE: 86 MMHG | BODY MASS INDEX: 33.43 KG/M2 | SYSTOLIC BLOOD PRESSURE: 144 MMHG | HEIGHT: 66 IN | WEIGHT: 208 LBS

## 2024-07-05 DIAGNOSIS — M17.12 OSTEOARTHRITIS OF LEFT KNEE, UNSPECIFIED OSTEOARTHRITIS TYPE: ICD-10-CM

## 2024-07-05 DIAGNOSIS — M25.562 LEFT KNEE PAIN, UNSPECIFIED CHRONICITY: Primary | ICD-10-CM

## 2024-07-05 PROCEDURE — 3079F DIAST BP 80-89 MM HG: CPT | Performed by: ORTHOPAEDIC SURGERY

## 2024-07-05 PROCEDURE — 20610 DRAIN/INJ JOINT/BURSA W/O US: CPT | Performed by: ORTHOPAEDIC SURGERY

## 2024-07-05 PROCEDURE — 3077F SYST BP >= 140 MM HG: CPT | Performed by: ORTHOPAEDIC SURGERY

## 2024-07-05 PROCEDURE — 99203 OFFICE O/P NEW LOW 30 MIN: CPT | Performed by: ORTHOPAEDIC SURGERY

## 2024-07-05 RX ADMIN — TRIAMCINOLONE ACETONIDE 40 MG: 40 INJECTION, SUSPENSION INTRA-ARTICULAR; INTRAMUSCULAR at 13:59

## 2024-07-05 RX ADMIN — LIDOCAINE HYDROCHLORIDE 5 ML: 10 INJECTION, SOLUTION INFILTRATION; PERINEURAL at 13:59

## 2024-07-05 NOTE — PROGRESS NOTES
"Chief Complaint  Pain and Initial Evaluation of the Left Knee     Subjective      Jana I Schoenbaechler presents to [facility] for initial evaluation of the left knee. She had X ray on 6/3/24 and has had pain for a few months.  Her pain is on the lateral aspect of the knee. She had a knee arthroscopy in .   She notes locking of the knee and she has to loosen it up.      No Known Allergies     Social History     Socioeconomic History    Marital status:    Tobacco Use    Smoking status: Former     Current packs/day: 0.00     Average packs/day: 2.0 packs/day for 40.2 years (80.5 ttl pk-yrs)     Types: Cigarettes     Start date:      Quit date: 2017     Years since quittin.2     Passive exposure: Past    Smokeless tobacco: Never   Vaping Use    Vaping status: Former    Substances: Nicotine    Devices: Refillable tank    Passive vaping exposure: Yes   Substance and Sexual Activity    Alcohol use: Not Currently     Comment: 36 yrs ago    Drug use: Never    Sexual activity: Not Currently        I reviewed the patient's chief complaint, history of present illness, review of systems, past medical history, surgical history, family history, social history, medications, and allergy list.     Review of Systems     Constitutional: Denies fevers, chills, weight loss  Cardiovascular: Denies chest pain, shortness of breath  Skin: Denies rashes, acute skin changes  Neurologic: Denies headache, loss of consciousness        Vital Signs:   /86   Pulse 62   Ht 167.6 cm (66\")   Wt 94.3 kg (208 lb)   SpO2 96%   BMI 33.57 kg/m²          Physical Exam  General: Alert. No acute distress    Ortho Exam        LEFT KNEE Flexion 105. Extension -3. Stable to varus/valgus stress. Stable to anterior/posterior drawer. Neurovascularly intact. Pain with Lawanda. Negative Lachman. Positive EHL, FHL, HS and TA. Sensation intact to light touch all 5 nerves of the foot. Ambulates with " Antalgic gait. Patella is well tracking. Calf supple, non-tender. Negative tenderness to the medial joint line. Positive tenderness to the lateral joint line. Negative Crepitus. Good strength to hamstrings, quadriceps, dorsiflexors, and plantar flexors.  Knee Extensor Mechanism intact       Large Joint: L knee  Date/Time: 7/5/2024 1:59 PM  Consent given by: patient  Site marked: site marked  Timeout: Immediately prior to procedure a time out was called to verify the correct patient, procedure, equipment, support staff and site/side marked as required   Supporting Documentation  Indications: pain   Procedure Details  Location: knee - L knee  Preparation: Patient was prepped and draped in the usual sterile fashion  Needle gauge: 21 G.  Medications administered: 5 mL lidocaine 1 %; 40 mg triamcinolone acetonide 40 MG/ML  Patient tolerance: patient tolerated the procedure well with no immediate complications      This injection documentation was Scribed for Filiberto Gray MD by Lolita Armenta MA.  07/05/24   14:00 EDT        Imaging Results (Most Recent)       None             Result Review :     Narrative & Impression   XR KNEE 3 VW LEFT-     Date of Exam: 6/3/2024 1:36 PM     Indication: LEFT KNEE PAIN; M25.562-Pain in left knee lateral pain for 4  days. No known injury.     Comparison: None available.     Findings:  The bone mineral density is normal. No acute fractures or dislocations.  No joint effusion. There is moderate medial lateral compartment and mild  patellofemoral compartment joint space narrowing. Small to moderate size  osteophytes.     IMPRESSION:  Impression:  Moderate medial and lateral compartment and mild patellofemoral  compartment arthritis of the knee. No fractures or dislocations.           Assessment and Plan     Diagnoses and all orders for this visit:    1. Left knee pain, unspecified chronicity (Primary)    2. Osteoarthritis of left knee, unspecified osteoarthritis type    Other orders  -      Large Joint: L knee        Discussed the treatment plan with the patient. I reviewed the X-rays that were obtained 6/3/24 with the patient.     Discussed the treatment options with the patient, operative vs non-operative.     Discussed the risks and benefits of conservative measures. The patient expressed understanding and wished to proceed with a left knee steroid injection.  She tolerated the injection well.     In 6 weeks she can discuss a Visco supplementation of the left knee.     Will obtain X-Rays of left knee standing at next visit if considering surgical intervention. .    Call or return if worsening symptoms.    Follow Up     PRN      Patient was given instructions and counseling regarding her condition or for health maintenance advice. Please see specific information pulled into the AVS if appropriate.     Scribed for Filiberto Gray MD by Aislinn Wan MA.  07/05/24   13:41 EDT    I have personally performed the services described in this document as scribed by the above individual and it is both accurate and complete. Filiberto Gray MD 07/08/24

## 2024-07-08 RX ORDER — LIDOCAINE HYDROCHLORIDE 10 MG/ML
5 INJECTION, SOLUTION INFILTRATION; PERINEURAL
Status: COMPLETED | OUTPATIENT
Start: 2024-07-05 | End: 2024-07-05

## 2024-07-08 RX ORDER — TRIAMCINOLONE ACETONIDE 40 MG/ML
40 INJECTION, SUSPENSION INTRA-ARTICULAR; INTRAMUSCULAR
Status: COMPLETED | OUTPATIENT
Start: 2024-07-05 | End: 2024-07-05

## 2024-07-22 ENCOUNTER — CLINICAL SUPPORT NO REQUIREMENTS (OUTPATIENT)
Dept: CARDIOLOGY | Facility: CLINIC | Age: 69
End: 2024-07-22
Payer: MEDICARE

## 2024-07-22 ENCOUNTER — OFFICE VISIT (OUTPATIENT)
Dept: CARDIOLOGY | Facility: CLINIC | Age: 69
End: 2024-07-22
Payer: MEDICARE

## 2024-07-22 VITALS
HEIGHT: 66 IN | DIASTOLIC BLOOD PRESSURE: 83 MMHG | BODY MASS INDEX: 32.14 KG/M2 | WEIGHT: 200 LBS | HEART RATE: 62 BPM | SYSTOLIC BLOOD PRESSURE: 141 MMHG

## 2024-07-22 DIAGNOSIS — Z95.0 PRESENCE OF CARDIAC PACEMAKER: ICD-10-CM

## 2024-07-22 DIAGNOSIS — Z95.0 PRESENCE OF CARDIAC PACEMAKER: Primary | ICD-10-CM

## 2024-07-22 DIAGNOSIS — R07.2 CHEST PAIN, PRECORDIAL: ICD-10-CM

## 2024-07-22 DIAGNOSIS — I50.22 CHRONIC HFREF (HEART FAILURE WITH REDUCED EJECTION FRACTION): Primary | ICD-10-CM

## 2024-07-22 PROCEDURE — 3079F DIAST BP 80-89 MM HG: CPT | Performed by: INTERNAL MEDICINE

## 2024-07-22 PROCEDURE — 93280 PM DEVICE PROGR EVAL DUAL: CPT | Performed by: INTERNAL MEDICINE

## 2024-07-22 PROCEDURE — 3077F SYST BP >= 140 MM HG: CPT | Performed by: INTERNAL MEDICINE

## 2024-07-22 PROCEDURE — 99214 OFFICE O/P EST MOD 30 MIN: CPT | Performed by: INTERNAL MEDICINE

## 2024-07-22 NOTE — ASSESSMENT & PLAN NOTE
Patient previously mildly reduced last was low normal she is very stable symptomatically we will go ahead and repeat her echocardiogram to see if EF is reduced consider beta-blocker if low or the addition of SGLT   Enoxaparin/Lovenox - Dietary Advice/Enoxaparin/Lovenox - Follow up monitoring/Enoxaparin/Lovenox - Potential for adverse drug reactions and interactions/Enoxaparin/Lovenox - Compliance

## 2024-07-22 NOTE — PROGRESS NOTES
Chief Complaint  Follow-up, Pacemaker Check, and Hypertension    Subjective    Patient following up today with no new issues or problems denies any tachycardic issues no change in breathing ability  Past Medical History:   Diagnosis Date    Arthritis     Atrial tachycardia     Chowdary esophagus     Cardiac pacemaker     CHB (complete heart block)     Chronic HFrEF (heart failure with reduced ejection fraction)  10/25/2021    10/25/21 echo: Estimated right ventricular systolic pressure from tricuspid regurgitation is markedly elevated (>55 mmHg). Mild LVH. Estimated left ventricular EF = 45% Left ventricular systolic function is mildly decreased.      GERD (gastroesophageal reflux disease)     Glaucoma     LEFT EYE    Hypertension     Osteoporosis          Current Outpatient Medications:     brimonidine (ALPHAGAN) 0.2 % ophthalmic solution, Administer 1 drop to both eyes 2 (two) times a day., Disp: , Rfl:     cholecalciferol (VITAMIN D3) 25 MCG (1000 UT) tablet, Take 1.25 Units by mouth Take As Directed. Every 5 days, Disp: , Rfl:     dorzolamide-timolol (COSOPT) 22.3-6.8 MG/ML ophthalmic solution, Administer 1 drop to both eyes 2 (Two) Times a Day., Disp: , Rfl:     hyoscyamine (LEVSIN) 0.125 MG SL tablet, Place 1 tablet under the tongue Every 4 (Four) Hours As Needed for Cramping., Disp: 120 tablet, Rfl: 5    latanoprost (XALATAN) 0.005 % ophthalmic solution, Administer 1 drop to both eyes Every Night., Disp: , Rfl:     lisinopril (PRINIVIL,ZESTRIL) 20 MG tablet, Take 1 tablet by mouth Daily., Disp: 90 tablet, Rfl: 3    pantoprazole (PROTONIX) 40 MG EC tablet, Take 1 tablet by mouth once daily, Disp: 90 tablet, Rfl: 1    Medications Discontinued During This Encounter   Medication Reason    dicyclomine (BENTYL) 20 MG tablet *Therapy completed     No Known Allergies     Social History     Tobacco Use    Smoking status: Former     Current packs/day: 0.00     Average packs/day: 2.0 packs/day for 40.2 years (80.5 ttl  "pk-yrs)     Types: Cigarettes     Start date:      Quit date: 2017     Years since quittin.3     Passive exposure: Past    Smokeless tobacco: Never   Vaping Use    Vaping status: Former    Substances: Nicotine    Devices: Refillable tank    Passive vaping exposure: Yes   Substance Use Topics    Alcohol use: Not Currently     Comment: 36 yrs ago    Drug use: Never       Family History   Problem Relation Age of Onset    Colon cancer Neg Hx         Objective     /83   Pulse 62   Ht 167.6 cm (66\")   Wt 90.7 kg (200 lb)   BMI 32.28 kg/m²       Physical Exam    General Appearance:   no acute distress  Alert and oriented x3  HENT:   lips not cyanotic  Atraumatic  Neck:  No jvd   supple  Respiratory:  no respiratory distress  normal breath sounds  no rales  Cardiovascular:  Regular rate and rhythm  no S3, no S4   no murmur  no rub  Extremities  No cyanosis  lower extremity edema: none    Skin:   warm, dry  No rashes      Result Review :     proBNP   Date Value Ref Range Status   10/25/2021 2,605.0 (H) 0.0 - 900.0 pg/mL Final     CMP          2023    09:24   CMP   Creatinine 0.93    EGFR 67.5    Calcium 9.2         Lab Results   Component Value Date    TSH 1.190 2019      No results found for: \"FREET4\"   No results found for: \"DDIMERQUANT\"  Magnesium   Date Value Ref Range Status   10/26/2021 1.9 1.6 - 2.4 mg/dL Final      No results found for: \"DIGOXIN\"   Lab Results   Component Value Date    TROPONINT 0.013 10/26/2021             Lab Results   Component Value Date    POCTROP 0.08 10/25/2021       Results for orders placed in visit on 22    Adult Transthoracic Echo Complete W/ Cont if Necessary Per Protocol    Interpretation Summary  · Estimated left ventricular EF = 50% Left ventricular systolic function is low normal with area of apical septal hypokinesis  · Left ventricular wall thickness is consistent with mild concentric hypertrophy.  · The left ventricular cavity is mildly " dilated.                 Diagnoses and all orders for this visit:    1. Chronic HFrEF (heart failure with reduced ejection fraction) (Primary)  Assessment & Plan:  Patient previously mildly reduced last was low normal she is very stable symptomatically we will go ahead and repeat her echocardiogram to see if EF is reduced consider beta-blocker if low or the addition of SGLT    Orders:  -     Adult Transthoracic Echo Complete W/ Cont if Necessary Per Protocol; Future    2. Chest pain, precordial  Assessment & Plan:  Patient's chest pain resolved      3. CHB s/p cardiac pacemaker  Assessment & Plan:  Patient's device working normally AV delay was extended as well as the PVARP was changed to 40 ms.              Follow Up     Return in about 6 months (around 1/22/2025) for Follow with Candace Hillman.          Patient was given instructions and counseling regarding her condition or for health maintenance advice. Please see specific information pulled into the AVS if appropriate.

## 2024-07-22 NOTE — ASSESSMENT & PLAN NOTE
Patient's device working normally AV delay was extended as well as the PVARP was changed to 40 ms.

## 2024-08-13 DIAGNOSIS — K22.719 BARRETT'S ESOPHAGUS WITH DYSPLASIA: ICD-10-CM

## 2024-08-13 RX ORDER — PANTOPRAZOLE SODIUM 40 MG/1
TABLET, DELAYED RELEASE ORAL
Qty: 90 TABLET | Refills: 0 | Status: SHIPPED | OUTPATIENT
Start: 2024-08-13

## 2024-09-11 NOTE — PROGRESS NOTES
"Chief Complaint   Diarrhea (Started in April and occurs randomly. ) and Follow-up (ENDO follow up- EGD)    History of Present Illness       Jana I Schoenbaechler is a 68 y.o. female who presents to [facility] for follow-up following after recent EGD with a history of lymphocytic colitis.  Patient previously treated for lymphocytic colitis with budesonide taper in May 2023 and reported tremendous improvement in her diarrhea having 1-2 soft bowel movements per day.  She reports that in April 2024 she began having diarrhea with urgency again occurring 1-2 times a month which has slightly increased each month up until now.  Patient is using Imodium as needed with minimal relief.  Patient wishes not to repeat budesonide as to the cause weight gain.  She continues Protonix for her reflux with good control.  Patient denies fever, nausea, vomiting, weight loss, night sweats, melena, hematochezia, hematemesis.    Endoscopy: Review of the patient's most recent EGD performed by Dr. Maurer on 3/12/24 duque's repeat 3 years      Colonoscopy: Review of the patient's most recent colonoscopy performed by Dr. Maurer on 03.16.2023 normal colonoscopy lymphocytic colitis     EGD: Review of the patient's most recent EGD performed by Dr. Maurer on 03.04.2021 Small hiatal hernia, benign intrinsic stricture dilated to 18 mm, salmon-colored short segment Duque's esophagus.  Normal stomach and duodenum.  Repeat EGD in 3 years for surveillance of Duque's esophagus.  Reflux esophagitis noted on biopsy.  Results       Result Review :   The following data was reviewed by: KEILA Méndez on 09/12/2024:    CMP          9/21/2023    09:24   CMP   Creatinine 0.93    EGFR 67.5    Calcium 9.2          Iron Profile No results found for: \"IRON\", \"TIBC\", \"LABIRON\", \"TRANSFERRIN\"  Ferritin No results found for: \"FERRITIN\"            Past Medical History       Past Medical History:   Diagnosis Date    " Arthritis     Atrial tachycardia     Chowdary esophagus     Cardiac pacemaker     CHB (complete heart block)     Chronic HFrEF (heart failure with reduced ejection fraction)  10/25/2021    10/25/21 echo: Estimated right ventricular systolic pressure from tricuspid regurgitation is markedly elevated (>55 mmHg). Mild LVH. Estimated left ventricular EF = 45% Left ventricular systolic function is mildly decreased.      GERD (gastroesophageal reflux disease)     Glaucoma     LEFT EYE    Hypertension     Osteoporosis        Past Surgical History:   Procedure Laterality Date    CARDIAC CATHETERIZATION N/A 10/26/2021    Procedure: Left Heart Cath;  Surgeon: Calixto Fiore MD;  Location: [facility] CATH INVASIVE LOCATION;  Service: Cardiovascular;  Laterality: N/A;    CARDIAC ELECTROPHYSIOLOGY PROCEDURE N/A 10/26/2021    Procedure: Pacemaker SC new;  Surgeon: Calixto Fiore MD;  Location: [facility] CATH INVASIVE LOCATION;  Service: Cardiovascular;  Laterality: N/A;    CHOLECYSTECTOMY      COLONOSCOPY N/A 03/16/2023    Procedure: COLONOSCOPY WITH RANDOM COLON BIOPSIES;  Surgeon: Vito Maurer MD;  Location: [facility] ENDOSCOPY;  Service: Gastroenterology;  Laterality: N/A;  NORMAL COLON, NORMAL TI    ENDOSCOPY N/A 3/12/2024    Procedure: ESOPHAGOGASTRODUODENOSCOPY WITH BIOPSIES;  Surgeon: Vito Maurer MD;  Location: [facility] ENDOSCOPY;  Service: Gastroenterology;  Laterality: N/A;  HIATAL HERNIA/ BARRETTS ESOPHAGUS    EYE SURGERY Left     INSERT / REPLACE / REMOVE PACEMAKER      10/26/2021    KNEE SURGERY Right     UPPER GASTROINTESTINAL ENDOSCOPY           Current Outpatient Medications:     brimonidine (ALPHAGAN) 0.2 % ophthalmic solution, Administer 1 drop to both eyes 2 (two) times a day., Disp: , Rfl:     cholecalciferol (VITAMIN D3) 25 MCG (1000 UT) tablet, Take 1.25 Units by mouth Take As Directed. Every 5 days, Disp: , Rfl:     dorzolamide-timolol (COSOPT) 22.3-6.8 MG/ML ophthalmic solution, Administer 1 drop to  "both eyes 2 (Two) Times a Day., Disp: , Rfl:     hyoscyamine (LEVSIN) 0.125 MG SL tablet, Place 1 tablet under the tongue Every 4 (Four) Hours As Needed for Cramping., Disp: 120 tablet, Rfl: 5    latanoprost (XALATAN) 0.005 % ophthalmic solution, Administer 1 drop to both eyes Every Night., Disp: , Rfl:     lisinopril (PRINIVIL,ZESTRIL) 20 MG tablet, Take 1 tablet by mouth Daily., Disp: 90 tablet, Rfl: 3    pantoprazole (PROTONIX) 40 MG EC tablet, Take 1 tablet by mouth once daily, Disp: 90 tablet, Rfl: 0    colestipol (Colestid) 1 g tablet, Take 1 tablet by mouth 2 (Two) Times a Day for 30 days., Disp: 60 tablet, Rfl: 2     No Known Allergies    Family History   Problem Relation Age of Onset    Colon cancer Neg Hx         Social History     Social History Narrative    Not on file       Objective     Vital Signs:   /73 (BP Location: Right arm, Patient Position: Sitting, Cuff Size: Adult)   Pulse 80   Ht 167.6 cm (66\")   Wt 93.3 kg (205 lb 9.6 oz)   SpO2 100%   BMI 33.18 kg/m²       Physical Exam  Constitutional:       Appearance: Normal appearance.   Pulmonary:      Effort: Pulmonary effort is normal.   Neurological:      General: No focal deficit present.      Mental Status: She is alert and oriented to person, place, and time.   Psychiatric:         Mood and Affect: Mood normal.         Behavior: Behavior normal.           Assessment & Plan          Assessment and Plan    Diagnoses and all orders for this visit:    1. Chowdary's esophagus with dysplasia (Primary)    2. Irritable bowel syndrome with diarrhea    3. Hiatal hernia    4. Lymphocytic colitis    5. Diarrhea, unspecified type    Other orders  -     colestipol (Colestid) 1 g tablet; Take 1 tablet by mouth 2 (Two) Times a Day for 30 days.  Dispense: 60 tablet; Refill: 2      68-year-old female presenting the office today for follow-up after recent EGD we reviewed EGD and pathology.  Patient has a history of Chowdary's, IBS diarrhea, lymphocytic " colitis, and hiatal hernia.  Patient's diarrhea has returned with her history of lymphocytic colitis patient wishes not to do budesonide taper due to weight gain.  We will trial colestipol 1 g twice daily with titrating dose.  She will add Pepto-Bismol tablets and continue use of Imodium as needed.  Patient will follow-up with us in 2 to 3 months.  Patient agreeable to this plan and will call with any questions or concerns.          Follow Up       Follow Up   Return in about 3 months (around 12/12/2024).  Patient was given instructions and counseling regarding her condition or for health maintenance advice. Please see specific information pulled into the AVS if appropriate.

## 2024-09-12 ENCOUNTER — OFFICE VISIT (OUTPATIENT)
Dept: GASTROENTEROLOGY | Facility: CLINIC | Age: 69
End: 2024-09-12
Payer: MEDICARE

## 2024-09-12 VITALS
HEIGHT: 66 IN | BODY MASS INDEX: 33.04 KG/M2 | WEIGHT: 205.6 LBS | SYSTOLIC BLOOD PRESSURE: 149 MMHG | OXYGEN SATURATION: 100 % | DIASTOLIC BLOOD PRESSURE: 73 MMHG | HEART RATE: 80 BPM

## 2024-09-12 DIAGNOSIS — K22.719 BARRETT'S ESOPHAGUS WITH DYSPLASIA: Primary | ICD-10-CM

## 2024-09-12 DIAGNOSIS — K44.9 HIATAL HERNIA: ICD-10-CM

## 2024-09-12 DIAGNOSIS — R19.7 DIARRHEA, UNSPECIFIED TYPE: ICD-10-CM

## 2024-09-12 DIAGNOSIS — K52.832 LYMPHOCYTIC COLITIS: ICD-10-CM

## 2024-09-12 DIAGNOSIS — K58.0 IRRITABLE BOWEL SYNDROME WITH DIARRHEA: ICD-10-CM

## 2024-09-12 RX ORDER — MONTELUKAST SODIUM 4 MG/1
1 TABLET, CHEWABLE ORAL 2 TIMES DAILY
Qty: 60 TABLET | Refills: 2 | Status: SHIPPED | OUTPATIENT
Start: 2024-09-12 | End: 2024-10-12

## 2024-09-25 ENCOUNTER — TELEPHONE (OUTPATIENT)
Dept: GASTROENTEROLOGY | Facility: CLINIC | Age: 69
End: 2024-09-25
Payer: MEDICARE

## 2024-10-16 ENCOUNTER — HOSPITAL ENCOUNTER (OUTPATIENT)
Dept: CT IMAGING | Facility: HOSPITAL | Age: 69
Discharge: HOME | End: 2024-10-16
Admitting: NURSE PRACTITIONER
Payer: MEDICARE

## 2024-10-16 DIAGNOSIS — F17.211 CIGARETTE NICOTINE DEPENDENCE IN REMISSION: ICD-10-CM

## 2024-10-16 PROCEDURE — 71271 CT THORAX LUNG CANCER SCR C-: CPT

## 2024-10-17 DIAGNOSIS — R93.89 ABNORMAL CT SCAN, CHEST: ICD-10-CM

## 2024-10-17 DIAGNOSIS — R91.1 LUNG NODULE: Primary | ICD-10-CM

## 2024-10-30 ENCOUNTER — TRANSCRIBE ORDERS (OUTPATIENT)
Dept: ADMINISTRATIVE | Facility: HOSPITAL | Age: 69
End: 2024-10-30
Payer: MEDICARE

## 2024-10-30 DIAGNOSIS — M81.0 OSTEOPOROSIS, POST-MENOPAUSAL: Primary | ICD-10-CM

## 2024-11-06 DIAGNOSIS — K22.719 BARRETT'S ESOPHAGUS WITH DYSPLASIA: ICD-10-CM

## 2024-11-06 RX ORDER — PANTOPRAZOLE SODIUM 40 MG/1
TABLET, DELAYED RELEASE ORAL
Qty: 90 TABLET | Refills: 2 | Status: SHIPPED | OUTPATIENT
Start: 2024-11-06

## 2024-11-06 NOTE — TELEPHONE ENCOUNTER
Medication Requested Pantoprazole    Last Refill 8/2024    Last OV 9/2024    Next OV 12/2024    Medication pended for approval and correct pharmacy verified Yes

## 2024-11-10 NOTE — PROGRESS NOTES
Primary Care Provider  Guille Duarte MD     Referring Provider  No ref. provider found     Chief Complaint  Shortness of Breath, Cough, Lung Nodule, and Follow-up (7 month follow up. )    Subjective          History of Presenting Illness  Patient is a 68-year-old female, patient of Dr. Mcintyre who presents for management of dyspnea and lung nodule who presents for a follow-up visit today.  Patient's  is present with the patient in the office today.  Patient states that since last visit her breathing is doing well.  Patient states that time she does get short of breath that is worse with heavy exertion and when walking upstairs, mild in severity, and improved with rest.  Patient currently denies any coughing or wheezing.  Patient is currently not on any inhalers or nebulizer treatments at this time.  Since last office visit patient had a  low dose chest CT scan completed on 10/17/2024. Report states  new 7 mm nodule in the medial right upper lobe. Patient denies fever, chills, night sweats, swollen glands in the head and neck, unintentional weight loss, hemoptysis, purulent sputum production, dysphagia, chest pain, palpitations, chest tightness, abdominal pain, nausea, vomiting, and diarrhea.  Patient also denies any myalgias, changes in sense of taste and/or smell, sore throat, any other coronavirus or flu-like symptoms.  Patient denies any leg swelling, orthopnea, paroxysmal nocturnal dyspnea.  Patient is able to perform activities of daily living.        Review of Systems     Family History   Problem Relation Age of Onset    Colon cancer Neg Hx         Social History     Socioeconomic History    Marital status:    Tobacco Use    Smoking status: Former     Current packs/day: 0.00     Average packs/day: 2.0 packs/day for 40.2 years (80.5 ttl pk-yrs)     Types: Cigarettes     Start date:      Quit date: 2017     Years since quittin.6     Passive exposure: Past    Smokeless tobacco: Never    Vaping Use    Vaping status: Former    Substances: Nicotine    Devices: [employer]    Passive vaping exposure: Yes   Substance and Sexual Activity    Alcohol use: Not Currently     Comment: 36 yrs ago    Drug use: Never    Sexual activity: Not Currently        Past Medical History:   Diagnosis Date    Arthritis     Atrial tachycardia     Chowdary esophagus     Cardiac pacemaker     CHB (complete heart block)     Chronic HFrEF (heart failure with reduced ejection fraction)  10/25/2021    10/25/21 echo: Estimated right ventricular systolic pressure from tricuspid regurgitation is markedly elevated (>55 mmHg). Mild LVH. Estimated left ventricular EF = 45% Left ventricular systolic function is mildly decreased.      GERD (gastroesophageal reflux disease)     Glaucoma     LEFT EYE    Hypertension     Osteoporosis         Immunization History   Administered Date(s) Administered    COVID-19 (MODERNA) 1st,2nd,3rd Dose Monovalent 05/03/2021, 06/03/2021, 12/17/2021    Fluzone High-Dose 65+YRS 10/08/2020    Fluzone High-Dose 65+yrs 02/09/2022, 11/29/2022, 10/17/2023    INFLUENZA SPLIT TRI 12/07/2020    Pneumococcal Conjugate 20-Valent (PCV20) 10/17/2023       No Known Allergies       Current Outpatient Medications:     brimonidine (ALPHAGAN) 0.2 % ophthalmic solution, Administer 1 drop to both eyes 2 (two) times a day., Disp: , Rfl:     cholecalciferol (VITAMIN D3) 25 MCG (1000 UT) tablet, Take 1.25 Units by mouth Take As Directed. Every 5 days, Disp: , Rfl:     colestipol (Colestid) 1 g tablet, Take 1 tablet by mouth 2 (Two) Times a Day for 30 days., Disp: 60 tablet, Rfl: 2    dorzolamide-timolol (COSOPT) 22.3-6.8 MG/ML ophthalmic solution, Administer 1 drop to both eyes 2 (Two) Times a Day., Disp: , Rfl:     hyoscyamine (LEVSIN) 0.125 MG SL tablet, Place 1 tablet under the tongue Every 4 (Four) Hours As Needed for Cramping., Disp: 120 tablet, Rfl: 5    latanoprost (XALATAN) 0.005 % ophthalmic solution, Administer 1  "drop to both eyes Every Night., Disp: , Rfl:     lisinopril (PRINIVIL,ZESTRIL) 20 MG tablet, Take 1 tablet by mouth Daily., Disp: 90 tablet, Rfl: 3    pantoprazole (PROTONIX) 40 MG EC tablet, Take 1 tablet by mouth once daily, Disp: 90 tablet, Rfl: 2     Objective     Physical Exam  Vital Signs:   WDWN, Alert, NAD.    HEENT:  PERRL, EOMI.  OP, nares clear, no sinus tenderness  Neck:  Supple, no JVD, no thyromegaly.  Lymph: no axillary, cervical, supraclavicular lymphadenopathy noted bilaterally  Chest:  good aeration, clear to auscultation bilaterally, tympanic to percussion bilaterally, no work of breathing noted  CV: RRR, no MGR, pulses 2+, equal.  Abd:  Soft, NT, ND, + BS, no HSM  EXT:  no clubbing, no cyanosis, no edema, no joint tenderness  Neuro:  A&Ox3, CN grossly intact, no focal deficits.  Skin: No rashes or lesions noted.    /74 (BP Location: Left arm, Patient Position: Sitting, Cuff Size: Large Adult)   Pulse 66   Temp 98 °F (36.7 °C) (Tympanic)   Resp 18   Ht 167.6 cm (65.98\")   SpO2 100% Comment: room air  BMI 33.20 kg/m²         Result Review :   I have reviewed my last office visit. I also reviewed low dose chest CT scan report dated from 10/17/2024. See scanned report.     Procedures:      CT Chest Low Dose Cancer Screening WO    Result Date: 10/17/2024  Impression: Low-dose screening CT scan of the chest demonstrating new 7 mm nodule in the medial right upper lobe.   Recommendation: 3 month follow up with LDCT.  PET/CT may be used if there is a =8 mm (=268 mm3) solid nodule or solid component  Lung Rads Assessment: Lung-RADS L4A - Suspicious, 5-15% chance of malignancy.    Electronically Signed By-SHERIF BLOCK MD On:10/17/2024 2:11 PM          Assessment and Plan      Assessment:  1.  COPD.  Patient declines all inhalers.  2.  Lung nodule. new 7 mm nodule in the medial right upper lobe on recent chest CT scan completed on 10/17/2024.  3.  Dyspnea.  4.  Upper airway resistance.  " Currently not on any treatment.  Patient uses saline rinse at times.  5.  Cardiac arrhythmia status post pacemaker placement.  Patient is under the care of Dr. Fiore.  6.  Tobacco abuse of cigarettes in remission.  Patient is enrolled in lung cancer screening.        Plan:  1.  Patient continues to decline all inhalers that she states that they did not help.  COPD action plan discussed with the patient in the office today.  2.  Patient is scheduled to have a repeat chest CT scan on 1/17/2025 at 7:15 am.  3.  Follow-up with cardiologist as scheduled.  4.  Vaccination status: High-dose flu vaccine is not available in the office today.  Patient is advised to receive this vaccine either through her pharmacy or her primary care provider.  Patient reports they are up-to-date with pneumonia and Covid vaccines.  Patient is advised to continue to follow CDC recommendations such as social distancing wearing a mask and washing hands for at least 20 seconds.  5.  Smoking status: former cigarette smo ker.  Patient is enrolled in lung cancer screening.  6.  Patient to call the office, 911, or go to the ER with new or worsening symptoms.  7.  Follow-up in January 2025 with Dr. Pham, sooner if needed.          Follow Up   Return for with Dr. Pham after 1/17/25.  Patient was given instructions and counseling regarding her condition or for health maintenance advice. Please see specific information pulled into the AVS if appropriate.

## 2024-11-19 ENCOUNTER — OFFICE VISIT (OUTPATIENT)
Dept: PULMONOLOGY | Facility: CLINIC | Age: 69
End: 2024-11-19
Payer: MEDICARE

## 2024-11-19 VITALS
RESPIRATION RATE: 18 BRPM | DIASTOLIC BLOOD PRESSURE: 74 MMHG | SYSTOLIC BLOOD PRESSURE: 135 MMHG | HEIGHT: 66 IN | HEART RATE: 66 BPM | BODY MASS INDEX: 33.2 KG/M2 | TEMPERATURE: 98 F | OXYGEN SATURATION: 100 %

## 2024-11-19 DIAGNOSIS — R06.00 DYSPNEA, UNSPECIFIED TYPE: ICD-10-CM

## 2024-11-19 DIAGNOSIS — F17.201 TOBACCO ABUSE, IN REMISSION: ICD-10-CM

## 2024-11-19 DIAGNOSIS — G47.8 UPPER AIRWAY RESISTANCE SYNDROME: ICD-10-CM

## 2024-11-19 DIAGNOSIS — Z95.0 PRESENCE OF CARDIAC PACEMAKER: ICD-10-CM

## 2024-11-19 DIAGNOSIS — J44.9 CHRONIC OBSTRUCTIVE PULMONARY DISEASE, UNSPECIFIED COPD TYPE: ICD-10-CM

## 2024-11-19 DIAGNOSIS — R91.1 LUNG NODULE: Primary | ICD-10-CM

## 2024-11-19 PROCEDURE — 3078F DIAST BP <80 MM HG: CPT | Performed by: NURSE PRACTITIONER

## 2024-11-19 PROCEDURE — 99213 OFFICE O/P EST LOW 20 MIN: CPT | Performed by: NURSE PRACTITIONER

## 2024-11-19 PROCEDURE — 1159F MED LIST DOCD IN RCRD: CPT | Performed by: NURSE PRACTITIONER

## 2024-11-19 PROCEDURE — 1160F RVW MEDS BY RX/DR IN RCRD: CPT | Performed by: NURSE PRACTITIONER

## 2024-11-19 PROCEDURE — 3075F SYST BP GE 130 - 139MM HG: CPT | Performed by: NURSE PRACTITIONER

## 2024-12-13 ENCOUNTER — OFFICE VISIT (OUTPATIENT)
Dept: GASTROENTEROLOGY | Facility: CLINIC | Age: 69
End: 2024-12-13
Payer: MEDICARE

## 2024-12-13 VITALS
HEART RATE: 69 BPM | SYSTOLIC BLOOD PRESSURE: 136 MMHG | BODY MASS INDEX: 34.62 KG/M2 | WEIGHT: 215.4 LBS | OXYGEN SATURATION: 100 % | HEIGHT: 66 IN | DIASTOLIC BLOOD PRESSURE: 67 MMHG

## 2024-12-13 DIAGNOSIS — K44.9 HIATAL HERNIA: ICD-10-CM

## 2024-12-13 DIAGNOSIS — K22.719 BARRETT'S ESOPHAGUS WITH DYSPLASIA: ICD-10-CM

## 2024-12-13 DIAGNOSIS — K58.0 IRRITABLE BOWEL SYNDROME WITH DIARRHEA: ICD-10-CM

## 2024-12-13 DIAGNOSIS — K52.832 LYMPHOCYTIC COLITIS: Primary | ICD-10-CM

## 2024-12-13 RX ORDER — MONTELUKAST SODIUM 4 MG/1
1 TABLET, CHEWABLE ORAL 2 TIMES DAILY
Qty: 60 TABLET | Refills: 4 | Status: SHIPPED | OUTPATIENT
Start: 2024-12-13 | End: 2025-01-12

## 2025-01-08 ENCOUNTER — OFFICE VISIT (OUTPATIENT)
Dept: ORTHOPEDIC SURGERY | Facility: CLINIC | Age: 70
End: 2025-01-08
Payer: MEDICARE

## 2025-01-08 VITALS
HEIGHT: 66 IN | DIASTOLIC BLOOD PRESSURE: 94 MMHG | SYSTOLIC BLOOD PRESSURE: 162 MMHG | HEART RATE: 80 BPM | BODY MASS INDEX: 34.55 KG/M2 | OXYGEN SATURATION: 98 % | WEIGHT: 215 LBS

## 2025-01-08 DIAGNOSIS — M25.562 LEFT KNEE PAIN, UNSPECIFIED CHRONICITY: Primary | ICD-10-CM

## 2025-01-08 DIAGNOSIS — M17.12 OSTEOARTHRITIS OF LEFT KNEE, UNSPECIFIED OSTEOARTHRITIS TYPE: ICD-10-CM

## 2025-01-08 NOTE — PROGRESS NOTES
"Chief Complaint  Follow-up of the Left Knee     Subjective      Jana I Schoenbaechler presents to [facility] for follow up of the left knee.  She had a left knee arthroscopy in . She had a steroid injection on 24.  She has had pain for awhile.  She has pain with prolonged standing, ambulation and stairs.  She has had no recent injury or fall.      No Known Allergies     Social History     Socioeconomic History    Marital status:    Tobacco Use    Smoking status: Former     Current packs/day: 0.00     Average packs/day: 2.0 packs/day for 40.2 years (80.5 ttl pk-yrs)     Types: Cigarettes     Start date:      Quit date: 2017     Years since quittin.7     Passive exposure: Past    Smokeless tobacco: Never   Vaping Use    Vaping status: Former    Substances: Nicotine    Devices: Refillable tank    Passive vaping exposure: Yes   Substance and Sexual Activity    Alcohol use: Not Currently     Comment: 36 yrs ago    Drug use: Never    Sexual activity: Not Currently        I reviewed the patient's chief complaint, history of present illness, review of systems, past medical history, surgical history, family history, social history, medications, and allergy list.     Review of Systems     Constitutional: Denies fevers, chills, weight loss  Cardiovascular: Denies chest pain, shortness of breath  Skin: Denies rashes, acute skin changes  Neurologic: Denies headache, loss of consciousness        Vital Signs:   /94   Pulse 80   Ht 167.6 cm (65.98\")   Wt 97.5 kg (215 lb)   SpO2 98%   BMI 34.72 kg/m²          Physical Exam  General: Alert. No acute distress    Ortho Exam        LEFT KNEE Flexion 105. Extension -3. Stable to varus/valgus stress. Stable to anterior/posterior drawer. Neurovascularly intact. Pain with Lawanda. Negative Lachman. Positive EHL, FHL, HS and TA. Sensation intact to light touch all 5 nerves of the foot. Ambulates with Antalgic gait. Patella " is well tracking. Calf supple, non-tender. Negative tenderness to the medial joint line. Positive tenderness to the lateral joint line. Negative Crepitus. Good strength to hamstrings, quadriceps, dorsiflexors, and plantar flexors.  Knee Extensor Mechanism intact       Large Joint: L knee  Date/Time: 1/8/2025 10:05 AM  Consent given by: patient  Site marked: site marked  Timeout: Immediately prior to procedure a time out was called to verify the correct patient, procedure, equipment, support staff and site/side marked as required   Supporting Documentation  Indications: pain   Procedure Details  Location: knee - L knee  Preparation: Patient was prepped and draped in the usual sterile fashion  Needle gauge: 21g.  Medications administered: 48 mg hylan 48 MG/6ML  Patient tolerance: patient tolerated the procedure well with no immediate complications      This injection documentation was Scribed for Filiberto Gray MD by Urvashi Kulkarni MA.  01/08/25   10:06 EST      Imaging Results (Most Recent)       None             Result Review :             Assessment and Plan     Diagnoses and all orders for this visit:    1. Left knee pain, unspecified chronicity (Primary)    2. Osteoarthritis of left knee, unspecified osteoarthritis type        Discussed the treatment plan with the patient.     Discussed the risks and benefits of conservative measures. The patient expressed understanding and wished to proceed with a left knee Synvisc injection.  She tolerated the injection well.       Call or return if worsening symptoms.    Follow Up     PRN      Patient was given instructions and counseling regarding her condition or for health maintenance advice. Please see specific information pulled into the AVS if appropriate.     Scribed for Filiberto Gray MD by Aislinn Wan MA.  01/08/25   09:43 EST    I have personally performed the services described in this document as scribed by the above individual and it is both accurate and  complete. Filiberto Gray MD 01/09/25

## 2025-01-13 RX ORDER — LISINOPRIL 20 MG/1
20 TABLET ORAL DAILY
Qty: 90 TABLET | Refills: 0 | Status: SHIPPED | OUTPATIENT
Start: 2025-01-13

## 2025-01-17 ENCOUNTER — HOSPITAL ENCOUNTER (OUTPATIENT)
Dept: CT IMAGING | Facility: HOSPITAL | Age: 70
Discharge: HOME | End: 2025-01-17
Payer: MEDICARE

## 2025-01-17 DIAGNOSIS — R91.1 LUNG NODULE: ICD-10-CM

## 2025-01-17 DIAGNOSIS — R93.89 ABNORMAL CT SCAN, CHEST: ICD-10-CM

## 2025-01-17 PROCEDURE — 71250 CT THORAX DX C-: CPT

## 2025-01-20 ENCOUNTER — HOSPITAL ENCOUNTER (OUTPATIENT)
Facility: HOSPITAL | Age: 70
Discharge: HOME | End: 2025-01-20
Admitting: INTERNAL MEDICINE
Payer: MEDICARE

## 2025-01-20 DIAGNOSIS — R91.1 LUNG NODULE: Primary | ICD-10-CM

## 2025-01-20 DIAGNOSIS — I50.22 CHRONIC HFREF (HEART FAILURE WITH REDUCED EJECTION FRACTION): ICD-10-CM

## 2025-01-20 LAB
AORTIC DIMENSIONLESS INDEX: 0.69 (DI)
ASCENDING AORTA: 2.7 CM
AV MEAN PRESS GRAD SYS DOP V1V2: 7 MMHG
AV VMAX SYS DOP: 177.9 CM/SEC
BH CV ECHO MEAS - AO MAX PG: 12.7 MMHG
BH CV ECHO MEAS - AO ROOT DIAM: 2.5 CM
BH CV ECHO MEAS - AO V2 VTI: 42.5 CM
BH CV ECHO MEAS - EF(MOD-SP4): 56 %
BH CV ECHO MEAS - FS: 30 %
BH CV ECHO MEAS - IVS/LVPW: 1.11 CM
BH CV ECHO MEAS - IVSD: 1 CM
BH CV ECHO MEAS - LA DIMENSION: 3.9 CM
BH CV ECHO MEAS - LAT PEAK E' VEL: 11.1 CM/SEC
BH CV ECHO MEAS - LV MAX PG: 7.8 MMHG
BH CV ECHO MEAS - LV MEAN PG: 3.4 MMHG
BH CV ECHO MEAS - LV V1 MAX: 140 CM/SEC
BH CV ECHO MEAS - LV V1 VTI: 29.3 CM
BH CV ECHO MEAS - LVIDD: 5.6 CM
BH CV ECHO MEAS - LVIDS: 3.9 CM
BH CV ECHO MEAS - LVOT DIAM: 2 CM
BH CV ECHO MEAS - LVPWD: 0.9 CM
BH CV ECHO MEAS - MED PEAK E' VEL: 11.5 CM/SEC
BH CV ECHO MEAS - MV A MAX VEL: 104 CM/SEC
BH CV ECHO MEAS - MV E MAX VEL: 62.7 CM/SEC
BH CV ECHO MEAS - MV E/A: 0.6
BH CV ECHO MEAS - RAP SYSTOLE: 5 MMHG
BH CV ECHO MEAS - RVDD: 3 CM
BH CV ECHO MEAS - RVSP: 23.6 MMHG
BH CV ECHO MEAS - TAPSE (>1.6): 2.6 CM
BH CV ECHO MEAS - TR MAX PG: 18.6 MMHG
BH CV ECHO MEAS - TR MAX VEL: 215.3 CM/SEC
BH CV ECHO MEASUREMENTS AVERAGE E/E' RATIO: 5.55
IVRT: 64 MS
LV EF 2D ECHO EST: 55 %

## 2025-01-20 PROCEDURE — 93306 TTE W/DOPPLER COMPLETE: CPT

## 2025-01-20 PROCEDURE — 93306 TTE W/DOPPLER COMPLETE: CPT | Performed by: INTERNAL MEDICINE

## 2025-01-21 ENCOUNTER — TELEPHONE (OUTPATIENT)
Dept: CARDIOLOGY | Facility: CLINIC | Age: 70
End: 2025-01-21
Payer: MEDICARE

## 2025-01-21 NOTE — TELEPHONE ENCOUNTER
----- Message from Candace Hillman sent at 1/21/2025  8:02 AM EST -----  Echocardiogram shows normal heart muscle function and no significant valve disease noted. Follow up as scheduled. Notify office of any new/worsening cardiac symptoms

## 2025-01-22 ENCOUNTER — OFFICE VISIT (OUTPATIENT)
Dept: CARDIOLOGY | Facility: CLINIC | Age: 70
End: 2025-01-22
Payer: MEDICARE

## 2025-01-22 VITALS
DIASTOLIC BLOOD PRESSURE: 87 MMHG | HEIGHT: 66 IN | WEIGHT: 216.6 LBS | BODY MASS INDEX: 34.81 KG/M2 | SYSTOLIC BLOOD PRESSURE: 147 MMHG | HEART RATE: 92 BPM

## 2025-01-22 DIAGNOSIS — Z95.0 PRESENCE OF CARDIAC PACEMAKER: ICD-10-CM

## 2025-01-22 DIAGNOSIS — I10 ESSENTIAL HYPERTENSION: ICD-10-CM

## 2025-01-22 DIAGNOSIS — I50.22 CHRONIC HFREF (HEART FAILURE WITH REDUCED EJECTION FRACTION): Primary | ICD-10-CM

## 2025-01-22 PROBLEM — R07.2 CHEST PAIN, PRECORDIAL: Status: RESOLVED | Noted: 2021-10-25 | Resolved: 2025-01-22

## 2025-01-22 PROBLEM — R06.00 DYSPNEA: Status: RESOLVED | Noted: 2022-02-09 | Resolved: 2025-01-22

## 2025-01-22 NOTE — PROGRESS NOTES
Chief Complaint  Follow-up, Chest Pain, CHB s/p cardiac pacemaker, and Chronic HFrEF (heart failure with reduced ejection fraction)    Subjective            History of Present Illness  Jana I Schoenbaechler is a 69-year-old female patient who presents to the office today for follow up. She has chronic HFrEF, presence of pacemaker, and hypertension. She reports that her blood pressure has been fluctuating more over the last few weeks. She also reports intermittently occurring epigastric/left side chest discomfort since December 2023. The longest episode was 20 minutes in duration and the most recent episode was 3 weeks ago. The only thing that has provided relief of symptoms is hyoscyamine. She had stress test last years that was negative for ischemia. She has recent echocardiogram shows normalized ejection fraction.     PMH  Past Medical History:   Diagnosis Date    Arthritis     Atrial tachycardia     Chowdary esophagus     Cardiac pacemaker     CHB (complete heart block)     Chronic HFrEF (heart failure with reduced ejection fraction)  10/25/2021    10/25/21 echo: Estimated right ventricular systolic pressure from tricuspid regurgitation is markedly elevated (>55 mmHg). Mild LVH. Estimated left ventricular EF = 45% Left ventricular systolic function is mildly decreased.      GERD (gastroesophageal reflux disease)     Glaucoma     LEFT EYE    Hypertension     Osteoporosis          ALLERGY  No Known Allergies       SURGICALHX  Past Surgical History:   Procedure Laterality Date    CARDIAC CATHETERIZATION N/A 10/26/2021    Procedure: Left Heart Cath;  Surgeon: Calixto Fiore MD;  Location: [facility] CATH INVASIVE LOCATION;  Service: Cardiovascular;  Laterality: N/A;    CARDIAC ELECTROPHYSIOLOGY PROCEDURE N/A 10/26/2021    Procedure: Pacemaker SC new;  Surgeon: Calixto Fiore MD;  Location: [facility] CATH INVASIVE LOCATION;  Service: Cardiovascular;  Laterality: N/A;    CHOLECYSTECTOMY      COLONOSCOPY N/A 03/16/2023     Procedure: COLONOSCOPY WITH RANDOM COLON BIOPSIES;  Surgeon: Vito Maurer MD;  Location: [facility] ENDOSCOPY;  Service: Gastroenterology;  Laterality: N/A;  NORMAL COLON, NORMAL TI    ENDOSCOPY N/A 3/12/2024    Procedure: ESOPHAGOGASTRODUODENOSCOPY WITH BIOPSIES;  Surgeon: Vito Maurer MD;  Location: [facility] ENDOSCOPY;  Service: Gastroenterology;  Laterality: N/A;  HIATAL HERNIA/ BARRETTS ESOPHAGUS    EYE SURGERY Left     INSERT / REPLACE / REMOVE PACEMAKER      10/26/2021    KNEE SURGERY Right     UPPER GASTROINTESTINAL ENDOSCOPY            SOC  Social History     Socioeconomic History    Marital status:    Tobacco Use    Smoking status: Former     Current packs/day: 0.00     Average packs/day: 2.0 packs/day for 40.2 years (80.5 ttl pk-yrs)     Types: Cigarettes     Start date:      Quit date: 2017     Years since quittin.8     Passive exposure: Past    Smokeless tobacco: Never   Vaping Use    Vaping status: Former    Passive vaping exposure: Yes   Substance and Sexual Activity    Alcohol use: Not Currently     Comment: 36 yrs ago    Drug use: Never    Sexual activity: Not Currently         FAMHX  Family History   Problem Relation Age of Onset    Colon cancer Neg Hx           MEDSIGONLY  Current Outpatient Medications on File Prior to Visit   Medication Sig    brimonidine (ALPHAGAN) 0.2 % ophthalmic solution Administer 1 drop to both eyes 2 (two) times a day.    cholecalciferol (VITAMIN D3) 25 MCG (1000 UT) tablet Take 1.25 Units by mouth Take As Directed. Every 5 days    colestipol (Colestid) 1 g tablet Take 1 tablet by mouth 2 (Two) Times a Day for 30 days.    dorzolamide-timolol (COSOPT) 22.3-6.8 MG/ML ophthalmic solution Administer 1 drop to both eyes 2 (Two) Times a Day.    hyoscyamine (LEVSIN) 0.125 MG SL tablet Place 1 tablet under the tongue Every 4 (Four) Hours As Needed for Cramping.    latanoprost (XALATAN) 0.005 % ophthalmic solution Administer 1 drop to both eyes  "Every Night.    lisinopril (PRINIVIL,ZESTRIL) 20 MG tablet Take 1 tablet by mouth once daily    pantoprazole (PROTONIX) 40 MG EC tablet Take 1 tablet by mouth once daily    [DISCONTINUED] ultra-purified peppermint oil (IBGARD) 90 mg capsule capsule Take 1 capsule by mouth Take As Directed. (Patient not taking: Reported on 1/22/2025)     No current facility-administered medications on file prior to visit.         Objective   /87   Pulse 92   Ht 167.6 cm (65.98\")   Wt 98.2 kg (216 lb 9.6 oz)   BMI 34.98 kg/m²       Physical Exam  Constitutional:       Appearance: She is obese.   HENT:      Head: Normocephalic.   Neck:      Vascular: No carotid bruit.   Cardiovascular:      Rate and Rhythm: Normal rate and regular rhythm.      Pulses: Normal pulses.      Heart sounds: Normal heart sounds. No murmur heard.  Pulmonary:      Effort: Pulmonary effort is normal.      Breath sounds: Normal breath sounds.   Musculoskeletal:      Cervical back: Neck supple.      Right lower leg: No edema.      Left lower leg: No edema.   Skin:     General: Skin is dry.   Neurological:      Mental Status: She is alert and oriented to person, place, and time.   Psychiatric:         Behavior: Behavior normal.         Result Review :   The following data was reviewed by: KEILA Nicholas on 01/22/2025:  proBNP   Date Value Ref Range Status   10/25/2021 2,605.0 (H) 0.0 - 900.0 pg/mL Final      Lab Results   Component Value Date    TSH 1.190 11/25/2019      No results found for: \"FREET4\"   No results found for: \"DDIMERQUANT\"  Magnesium   Date Value Ref Range Status   10/26/2021 1.9 1.6 - 2.4 mg/dL Final      No results found for: \"DIGOXIN\"   Lab Results   Component Value Date    TROPONINT 0.013 10/26/2021           Results for orders placed during the hospital encounter of 01/20/25    Adult Transthoracic Echo Complete W/ Cont if Necessary Per Protocol    Interpretation Summary    Left ventricular systolic function is normal. Estimated " left ventricular EF = 55%    The left ventricular cavity is borderline dilated.    Left ventricular diastolic function was normal.    Estimated right ventricular systolic pressure from tricuspid regurgitation is normal (<35 mmHg).           Assessment and Plan    Diagnoses and all orders for this visit:    1. Chronic HFrEF (heart failure with reduced ejection fraction)  (Primary)  Symptomatically stable and euvolemic on exam today, continue lisinopril 20 mg daily. Continue to monitor for fluid retention.     2. CHB s/p cardiac pacemaker  Last home remote monitor was 11/14/24 which showed only one episode of atrial tachycardia. There is 5 and a half years left on the battery life.    3. Essential hypertension  Mildly elevated in office today, check blood pressure twice a day for the next two weeks, blood pressure log provided for patient. For now continue lisinopril 20 mg daily.           Follow Up   Return in about 6 months (around 7/22/2025) for Follow up with Dr Fiore with device check.    Patient was given instructions and counseling regarding her condition or for health maintenance advice. Please see specific information pulled into the AVS if appropriate.     Emma I Schoenbaechler  reports that she quit smoking about 7 years ago. Her smoking use included cigarettes. She started smoking about 48 years ago. She has a 80.5 pack-year smoking history. She has been exposed to tobacco smoke. She has never used smokeless tobacco.          KEILA Nicholas  01/22/25  09:04 EST    Dictated Utilizing Dragon Dictation

## 2025-02-07 ENCOUNTER — TRANSCRIBE ORDERS (OUTPATIENT)
Dept: ADMINISTRATIVE | Facility: HOSPITAL | Age: 70
End: 2025-02-07
Payer: MEDICARE

## 2025-02-07 DIAGNOSIS — Z12.31 VISIT FOR SCREENING MAMMOGRAM: Primary | ICD-10-CM

## 2025-02-12 ENCOUNTER — OFFICE VISIT (OUTPATIENT)
Dept: PULMONOLOGY | Facility: CLINIC | Age: 70
End: 2025-02-12
Payer: MEDICARE

## 2025-02-12 VITALS
BODY MASS INDEX: 35.2 KG/M2 | HEIGHT: 66 IN | WEIGHT: 219 LBS | OXYGEN SATURATION: 98 % | SYSTOLIC BLOOD PRESSURE: 171 MMHG | TEMPERATURE: 98.4 F | RESPIRATION RATE: 16 BRPM | DIASTOLIC BLOOD PRESSURE: 106 MMHG | HEART RATE: 69 BPM

## 2025-02-12 DIAGNOSIS — J30.2 SEASONAL ALLERGIES: Primary | ICD-10-CM

## 2025-02-12 DIAGNOSIS — F17.201 TOBACCO ABUSE, IN REMISSION: ICD-10-CM

## 2025-02-12 DIAGNOSIS — R91.1 LUNG NODULE: ICD-10-CM

## 2025-02-12 DIAGNOSIS — J44.9 CHRONIC OBSTRUCTIVE PULMONARY DISEASE, UNSPECIFIED COPD TYPE: ICD-10-CM

## 2025-02-12 DIAGNOSIS — G47.8 UPPER AIRWAY RESISTANCE SYNDROME: ICD-10-CM

## 2025-02-12 DIAGNOSIS — Z23 FLU VACCINE NEED: ICD-10-CM

## 2025-02-12 RX ORDER — PREDNISOLN SP/MOXIFLOX/BROMFEN 1 %-0.5 %
DROPS OPHTHALMIC (EYE)
COMMUNITY
Start: 2025-01-29

## 2025-02-12 NOTE — PROGRESS NOTES
Primary Care Provider  Guille Duarte MD     Referring Provider  No ref. provider found     Chief Complaint  Follow-up, COPD, Results (Lung nodule), and Shortness of Breath    Subjective          Jana I Schoenbaechler presents to [facility] PULMONARY [facility]ARE MEDICINE  History of Present Illness  Jana I Schoenbaechler is a 69 y.o. female patient   History of Present Illness  The patient is a 69-year-old female with a history of exertional dyspnea and a lung nodule, here for a follow-up visit.    She reports no new health issues. She experiences shortness of breath during prolonged or rapid walking, which she attributes to her weight. Prior to her pacemaker implantation, she found it challenging to ascend stairs due to her breathing difficulties. Post-implantation, she continues to struggle with stair climbing but manages once at the top. She is not currently using any inhalers.    She has been abstaining from smoking since 2017, with occasional relapses in 2021. She has not received the influenza vaccine and expresses interest in receiving it today. She is up to date on her pneumonia vaccine.    SOCIAL HISTORY  The patient quit smoking cigarettes in 2017 and quit vaping in 2021 with a few slip-ups.    FAMILY HISTORY  There is no family history of lung cancer.    IMMUNIZATIONS  She is up to date on her pneumonia vaccine.    Review of Systems     General:  No Fatigue, No Fever No weight loss or loss of appetite  HEENT: No dysphagia, No Visual Changes, no rhinorrhea  Respiratory:  +intermittent cough,+Dyspnea, minimal phlegm, No Pleuritic Pain, no wheezing, no hemoptysis.  Cardiovascular: Denies chest pain, denies palpitations,+ROSE, No Chest Pressure  Gastrointestinal:  No Abdominal Pain, No Nausea, No Vomiting, No Diarrhea  Genitourinary:  No Dysuria, No Frequency, No Hesitancy  Musculoskeletal: No muscle pain or swelling  Endocrine:  No Heat Intolerance, No Cold Intolerance  Hematologic:   No Bleeding, No Bruising  Psychiatric:  No Anxiety, No Depression  Neurologic:  No Confusion, no Dysarthria, No Headaches  Skin:  No Rash, No Open Wounds    Family History   Problem Relation Age of Onset    Colon cancer Neg Hx         Social History     Socioeconomic History    Marital status:    Tobacco Use    Smoking status: Former     Current packs/day: 0.00     Average packs/day: 2.0 packs/day for 40.2 years (80.5 ttl pk-yrs)     Types: Cigarettes     Start date:      Quit date: 2017     Years since quittin.8     Passive exposure: Past    Smokeless tobacco: Never   Vaping Use    Vaping status: Former    Passive vaping exposure: Yes   Substance and Sexual Activity    Alcohol use: Not Currently     Comment: 36 yrs ago    Drug use: Never    Sexual activity: Not Currently        Past Medical History:   Diagnosis Date    Arthritis     Atrial tachycardia     Chowdary esophagus     Cardiac pacemaker     CHB (complete heart block)     Chronic HFrEF (heart failure with reduced ejection fraction)  10/25/2021    10/25/21 echo: Estimated right ventricular systolic pressure from tricuspid regurgitation is markedly elevated (>55 mmHg). Mild LVH. Estimated left ventricular EF = 45% Left ventricular systolic function is mildly decreased.      GERD (gastroesophageal reflux disease)     Glaucoma     LEFT EYE    Hypertension     Osteoporosis         Immunization History   Administered Date(s) Administered    COVID-19 (MODERNA) 1st,2nd,3rd Dose Monovalent 2021, 2021, 2021    Fluzone High-Dose 65+YRS 10/08/2020    Fluzone High-Dose 65+yrs 2022, 2022, 10/17/2023    INFLUENZA SPLIT TRI 2020    Pneumococcal Conjugate 20-Valent (PCV20) 10/17/2023         No Known Allergies       Current Outpatient Medications:     brimonidine (ALPHAGAN) 0.2 % ophthalmic solution, Administer 1 drop to both eyes 2 (two) times a day., Disp: , Rfl:     cholecalciferol (VITAMIN D3) 25 MCG (1000 UT)  "tablet, Take 1.25 Units by mouth Take As Directed. Every 5 days, Disp: , Rfl:     colestipol (Colestid) 1 g tablet, Take 1 tablet by mouth 2 (Two) Times a Day for 30 days., Disp: 60 tablet, Rfl: 4    dorzolamide-timolol (COSOPT) 22.3-6.8 MG/ML ophthalmic solution, Administer 1 drop to both eyes 2 (Two) Times a Day., Disp: , Rfl:     hyoscyamine (LEVSIN) 0.125 MG SL tablet, Place 1 tablet under the tongue Every 4 (Four) Hours As Needed for Cramping., Disp: 120 tablet, Rfl: 5    latanoprost (XALATAN) 0.005 % ophthalmic solution, Administer 1 drop to both eyes Every Night., Disp: , Rfl:     lisinopril (PRINIVIL,ZESTRIL) 20 MG tablet, Take 1 tablet by mouth once daily, Disp: 90 tablet, Rfl: 0    pantoprazole (PROTONIX) 40 MG EC tablet, Take 1 tablet by mouth once daily, Disp: 90 tablet, Rfl: 2    Prednisolon-Moxiflox-Bromfenac 1-0.5-0.075 % solution, Instill 1 drop in operative eye QID the day before sx, the day of sx and 1 week after surgery then BID for 3 weeks, Disp: , Rfl:      Objective   Physical Exam  Physical Exam      Vital Signs:   BP (!) 171/106 (BP Location: Left arm, Patient Position: Sitting, Cuff Size: Adult)   Pulse 69   Temp 98.4 °F (36.9 °C) (Tympanic)   Resp 16   Ht 167.6 cm (65.98\")   Wt 99.3 kg (219 lb)   SpO2 98% Comment: room air  BMI 35.37 kg/m²       Vital Signs Reviewed  WDWN, Alert, NAD.   HEENT:  PERRL, EOMI.  OP, nares clear, no sinus tenderness  Mallampatti classification : 1  Neck:  Supple, no JVD, no thyromegaly  Lymph: no axillary, cervical, supraclavicular lymphadenopathy noted bilaterally  Chest:  good aeration, bilateral diminished breath sounds, no wheezing, crackles or rhonchi, resonant to percussion b/l  CV: RRR, no MGR, pulses 2+, equal.  Abd:  Soft, NT, ND, + BS, no HSM  EXT:  no clubbing, no cyanosis, No BLE edema  Neuro:  A&Ox3, CN grossly intact, no focal deficits.  Skin: No rashes or lesions noted     Result Review :   The following data was reviewed by: Junior PERAZA" MD Vero on 02/12/2025:        Data reviewed : Radiologic studies CT chest reviewed.     Results  Imaging  CT scan shows stable lung nodule, which was 5 mm in 2022 and has now increased to 7 mm.        Narrative & Impression   CT CHEST WO CONTRAST DIAGNOSTIC     Date of Exam: 1/17/2025 7:25 AM EST     Indication: lung nodule, abnormal chest CT scan.     Comparison: 10/16/2024     Technique: Axial CT images were obtained of the chest without contrast administration.  Reconstructed coronal and sagittal images were also obtained. Automated exposure control and iterative construction methods were used.        Findings:  The central airways are patent. There are mild changes of emphysema. There is biapical pleural-parenchymal scarring. There is a right upper lobe nodule on image #45 measuring 7 mm, unchanged in size and appearance compared to the prior exam. No new   pulmonary nodules are identified.     No axillary or mediastinal adenopathy. Dual-lead pacemaker. Aorta and pulmonary artery are normal in caliber. Mild coronary artery calcifications. Esophagus is unremarkable. Limited evaluation of the upper abdomen demonstrates postcholecystectomy   changes.     No aggressive appearing lytic or sclerotic bone lesions.     IMPRESSION:  Impression:  No significant change in 7 mm right upper lobe pulmonary nodule. Recommend 6-month follow-up CT until 2-year stability is documented.           Electronically Signed: Teodoro Duran MD    1/19/2025 3:05 PM EST    Workstation ID: [id number]        Assessment and Plan    Diagnoses and all orders for this visit:    1. Seasonal allergies (Primary)  -     Fluzone High-Dose 65+yrs (9102-9158)    2. Tobacco abuse, in remission  -     Fluzone High-Dose 65+yrs ([id number])    3. Flu vaccine need  -     Fluzone High-Dose 65+yrs ([id number])    4. Upper airway resistance syndrome  -     Fluzone High-Dose 65+yrs ([id number])    5. Chronic obstructive pulmonary disease, unspecified COPD type  -      Fluzone High-Dose 65+yrs (0786-8290)    6. Lung nodule  -     Fluzone High-Dose 65+yrs ([id number])      Assessment & Plan  1. Pulmonary nodule.  The pulmonary nodule identified in 2022 has remained stable, with a slight increase in size from 5 mm to 7 mm. Given her history of smoking, annual monitoring is recommended. However, based on the most recent scan, a follow-up scan has been scheduled for 6 months. If the subsequent scan remains stable, the frequency of scans can be reduced to once a year. She is advised to maintain an active lifestyle.    2. Exertional dyspnea.  She experiences shortness of breath primarily during heavy exertion, such as walking a lot or walking fast. This is likely related to her history of smoking and being overweight. She reports improvement in her symptoms after getting a pacemaker. Weight loss is recommended to help alleviate her symptoms.    3. Health maintenance.  She will receive the influenza vaccine today. She is up to date on her pneumonia vaccine.    Follow-up  The patient will follow up in 6 months with Alka Downs.     Follow Up   Return in about 6 months (around 8/12/2025).  Patient was given instructions and counseling regarding her condition or for health maintenance advice. Please see specific information pulled into the AVS if appropriate.     Patient or patient representative verbalized consent for the use of Ambient Listening during the visit with  Junior Pham MD for chart documentation. 2/12/2025  07:48 EST    Electronically signed by Junior Pham MD, 2/12/2025, 08:04 EST.

## 2025-02-13 ENCOUNTER — TELEPHONE (OUTPATIENT)
Dept: CARDIOLOGY | Facility: CLINIC | Age: 70
End: 2025-02-13
Payer: MEDICARE

## 2025-02-13 NOTE — TELEPHONE ENCOUNTER
----- Message from Candace Hillman sent at 2/13/2025  9:12 AM EST -----  BP log shows persistently elevated blood pressures. Recommend increase lisinopril dose to 40 mg daily. Continue to monitor BP twice daily for the next week and send in log for review  ----- Message -----  From: Evelyn Lambert RegSched Rep  Sent: 2/13/2025   9:11 AM EST  To: KEILA Barba

## 2025-02-25 ENCOUNTER — TELEPHONE (OUTPATIENT)
Dept: CARDIOLOGY | Facility: CLINIC | Age: 70
End: 2025-02-25
Payer: MEDICARE

## 2025-02-25 RX ORDER — LISINOPRIL 40 MG/1
40 TABLET ORAL DAILY
Qty: 90 TABLET | Refills: 3 | Status: SHIPPED | OUTPATIENT
Start: 2025-02-25

## 2025-02-25 RX ORDER — AMLODIPINE BESYLATE 2.5 MG/1
2.5 TABLET ORAL DAILY
Qty: 90 TABLET | Refills: 3 | Status: SHIPPED | OUTPATIENT
Start: 2025-02-25

## 2025-02-25 NOTE — TELEPHONE ENCOUNTER
: Candace Hillman APRN (Nurse Practitioner)     Blood pressures are improved but still elevated, continue lisinopril 40 mg daily and add amlodipine 2.5 mg daily  Continue to monitor BP twice daily for the next week and send in log for review          Patient voiced understanding

## 2025-02-25 NOTE — TELEPHONE ENCOUNTER
----- Message from Candace Hillman sent at 2/25/2025 10:36 AM EST -----        ----- Message -----  From: Evelyn Lambert RegSched Rep  Sent: 2/25/2025   8:41 AM EST  To: KEILA Barba

## 2025-03-11 ENCOUNTER — TELEPHONE (OUTPATIENT)
Dept: CARDIOLOGY | Facility: CLINIC | Age: 70
End: 2025-03-11
Payer: MEDICARE

## 2025-03-11 NOTE — TELEPHONE ENCOUNTER
----- Message from Candace Hillman sent at 3/11/2025  3:47 PM EDT -----  Blood pressures are improved but still slightly elevated, recommend increase amlodipine dose to 5 mg daily. Continue to monitor BP and HR twice daily for the next week and send in log for review

## 2025-03-24 ENCOUNTER — LAB (OUTPATIENT)
Dept: LAB | Facility: HOSPITAL | Age: 70
End: 2025-03-24
Payer: MEDICARE

## 2025-03-24 ENCOUNTER — TRANSCRIBE ORDERS (OUTPATIENT)
Dept: ADMINISTRATIVE | Facility: HOSPITAL | Age: 70
End: 2025-03-24
Payer: MEDICARE

## 2025-03-24 DIAGNOSIS — E55.9 VITAMIN D DEFICIENCY: Primary | ICD-10-CM

## 2025-03-24 DIAGNOSIS — E55.9 VITAMIN D DEFICIENCY: ICD-10-CM

## 2025-03-24 LAB — 25(OH)D3 SERPL-MCNC: 28 NG/ML (ref 30–100)

## 2025-03-24 PROCEDURE — 36415 COLL VENOUS BLD VENIPUNCTURE: CPT

## 2025-03-24 PROCEDURE — 82306 VITAMIN D 25 HYDROXY: CPT

## 2025-04-01 ENCOUNTER — HOSPITAL ENCOUNTER (OUTPATIENT)
Dept: MAMMOGRAPHY | Facility: HOSPITAL | Age: 70
Discharge: HOME | End: 2025-04-01
Admitting: FAMILY MEDICINE
Payer: MEDICARE

## 2025-04-01 DIAGNOSIS — Z12.31 VISIT FOR SCREENING MAMMOGRAM: ICD-10-CM

## 2025-04-01 PROCEDURE — 77067 SCR MAMMO BI INCL CAD: CPT

## 2025-04-01 PROCEDURE — 77063 BREAST TOMOSYNTHESIS BI: CPT

## 2025-04-01 RX ORDER — AMLODIPINE BESYLATE 5 MG/1
5 TABLET ORAL DAILY
Qty: 90 TABLET | Refills: 3 | Status: SHIPPED | OUTPATIENT
Start: 2025-04-01

## 2025-05-22 RX ORDER — COLESTIPOL HYDROCHLORIDE 1 G/1
1 TABLET ORAL 2 TIMES DAILY
Qty: 60 TABLET | Refills: 4 | Status: SHIPPED | OUTPATIENT
Start: 2025-05-22

## 2025-07-10 ENCOUNTER — TRANSCRIBE ORDERS (OUTPATIENT)
Dept: ADMINISTRATIVE | Facility: HOSPITAL | Age: 70
End: 2025-07-10
Payer: MEDICARE

## 2025-07-10 DIAGNOSIS — Z78.0 POSTMENOPAUSAL: Primary | ICD-10-CM

## 2025-07-10 DIAGNOSIS — M81.0 POST-MENOPAUSAL OSTEOPOROSIS: ICD-10-CM

## 2025-07-14 ENCOUNTER — HOSPITAL ENCOUNTER (OUTPATIENT)
Dept: BONE DENSITY | Facility: HOSPITAL | Age: 70
Discharge: HOME | End: 2025-07-14
Payer: MEDICARE

## 2025-07-14 DIAGNOSIS — Z78.0 POSTMENOPAUSAL: ICD-10-CM

## 2025-07-14 DIAGNOSIS — M81.0 POST-MENOPAUSAL OSTEOPOROSIS: ICD-10-CM

## 2025-07-14 PROCEDURE — 77080 DXA BONE DENSITY AXIAL: CPT

## 2025-07-21 ENCOUNTER — HOSPITAL ENCOUNTER (OUTPATIENT)
Dept: CT IMAGING | Facility: HOSPITAL | Age: 70
Discharge: HOME | End: 2025-07-21
Admitting: NURSE PRACTITIONER
Payer: MEDICARE

## 2025-07-21 DIAGNOSIS — R91.1 LUNG NODULE: ICD-10-CM

## 2025-07-21 PROCEDURE — 71250 CT THORAX DX C-: CPT

## 2025-07-22 ENCOUNTER — LAB (OUTPATIENT)
Dept: LAB | Facility: HOSPITAL | Age: 70
End: 2025-07-22
Payer: MEDICARE

## 2025-07-22 ENCOUNTER — TRANSCRIBE ORDERS (OUTPATIENT)
Dept: ADMINISTRATIVE | Facility: HOSPITAL | Age: 70
End: 2025-07-22
Payer: MEDICARE

## 2025-07-22 DIAGNOSIS — Z79.899 ENCOUNTER FOR LONG-TERM (CURRENT) USE OF MEDICATIONS: ICD-10-CM

## 2025-07-22 DIAGNOSIS — M81.0 SENILE OSTEOPOROSIS: Primary | ICD-10-CM

## 2025-07-22 DIAGNOSIS — M81.0 SENILE OSTEOPOROSIS: ICD-10-CM

## 2025-07-22 LAB
25(OH)D3 SERPL-MCNC: 40.1 NG/ML (ref 30–100)
ALBUMIN SERPL-MCNC: 3.8 G/DL (ref 3.5–5.2)
ALBUMIN/GLOB SERPL: 1.2 G/DL
ALP SERPL-CCNC: 62 U/L (ref 39–117)
ALT SERPL W P-5'-P-CCNC: 7 U/L (ref 1–33)
ANION GAP SERPL CALCULATED.3IONS-SCNC: 8 MMOL/L (ref 5–15)
AST SERPL-CCNC: 18 U/L (ref 1–32)
BILIRUB SERPL-MCNC: 0.2 MG/DL (ref 0–1.2)
BUN SERPL-MCNC: 13 MG/DL (ref 8–23)
BUN/CREAT SERPL: 11.7 (ref 7–25)
CALCIUM SPEC-SCNC: 9 MG/DL (ref 8.6–10.5)
CALCIUM SPEC-SCNC: 9 MG/DL (ref 8.6–10.5)
CHLORIDE SERPL-SCNC: 106 MMOL/L (ref 98–107)
CO2 SERPL-SCNC: 23 MMOL/L (ref 22–29)
CREAT SERPL-MCNC: 1.11 MG/DL (ref 0.57–1)
EGFRCR SERPLBLD CKD-EPI 2021: 53.9 ML/MIN/1.73
GLOBULIN UR ELPH-MCNC: 3.2 GM/DL
GLUCOSE SERPL-MCNC: 76 MG/DL (ref 65–99)
PHOSPHATE SERPL-MCNC: 3.5 MG/DL (ref 2.5–4.5)
POTASSIUM SERPL-SCNC: 5.4 MMOL/L (ref 3.5–5.2)
PROT SERPL-MCNC: 7 G/DL (ref 6–8.5)
PTH-INTACT SERPL-MCNC: 51.9 PG/ML (ref 15–65)
SODIUM SERPL-SCNC: 137 MMOL/L (ref 136–145)

## 2025-07-22 PROCEDURE — 84100 ASSAY OF PHOSPHORUS: CPT

## 2025-07-22 PROCEDURE — 36415 COLL VENOUS BLD VENIPUNCTURE: CPT

## 2025-07-22 PROCEDURE — 83970 ASSAY OF PARATHORMONE: CPT

## 2025-07-22 PROCEDURE — 82306 VITAMIN D 25 HYDROXY: CPT

## 2025-07-22 PROCEDURE — 80053 COMPREHEN METABOLIC PANEL: CPT

## 2025-07-24 DIAGNOSIS — R91.1 LUNG NODULE: Primary | ICD-10-CM

## 2025-07-31 ENCOUNTER — TRANSCRIBE ORDERS (OUTPATIENT)
Facility: HOSPITAL | Age: 70
End: 2025-07-31
Payer: MEDICARE

## 2025-07-31 ENCOUNTER — LAB (OUTPATIENT)
Facility: HOSPITAL | Age: 70
End: 2025-07-31
Payer: MEDICARE

## 2025-07-31 ENCOUNTER — TELEPHONE (OUTPATIENT)
Dept: CARDIOLOGY | Facility: CLINIC | Age: 70
End: 2025-07-31
Payer: MEDICARE

## 2025-07-31 DIAGNOSIS — E87.5 HYPERPOTASSEMIA: Primary | ICD-10-CM

## 2025-07-31 DIAGNOSIS — E87.5 HYPERPOTASSEMIA: ICD-10-CM

## 2025-07-31 DIAGNOSIS — E87.5 HYPERKALEMIA: Primary | ICD-10-CM

## 2025-07-31 LAB
ALBUMIN SERPL-MCNC: 4 G/DL (ref 3.5–5.2)
ALBUMIN/GLOB SERPL: 1.2 G/DL
ALP SERPL-CCNC: 72 U/L (ref 39–117)
ALT SERPL W P-5'-P-CCNC: 10 U/L (ref 1–33)
ANION GAP SERPL CALCULATED.3IONS-SCNC: 9.7 MMOL/L (ref 5–15)
AST SERPL-CCNC: 18 U/L (ref 1–32)
BILIRUB SERPL-MCNC: 0.3 MG/DL (ref 0–1.2)
BUN SERPL-MCNC: 17.6 MG/DL (ref 8–23)
BUN/CREAT SERPL: 17.3 (ref 7–25)
CALCIUM SPEC-SCNC: 9.4 MG/DL (ref 8.6–10.5)
CHLORIDE SERPL-SCNC: 107 MMOL/L (ref 98–107)
CO2 SERPL-SCNC: 23.3 MMOL/L (ref 22–29)
CREAT SERPL-MCNC: 1.02 MG/DL (ref 0.57–1)
EGFRCR SERPLBLD CKD-EPI 2021: 59.7 ML/MIN/1.73
GLOBULIN UR ELPH-MCNC: 3.3 GM/DL
GLUCOSE SERPL-MCNC: 93 MG/DL (ref 65–99)
POTASSIUM SERPL-SCNC: 5.1 MMOL/L (ref 3.5–5.2)
PROT SERPL-MCNC: 7.3 G/DL (ref 6–8.5)
SODIUM SERPL-SCNC: 140 MMOL/L (ref 136–145)

## 2025-07-31 PROCEDURE — 83735 ASSAY OF MAGNESIUM: CPT

## 2025-07-31 PROCEDURE — 36415 COLL VENOUS BLD VENIPUNCTURE: CPT

## 2025-07-31 PROCEDURE — 80053 COMPREHEN METABOLIC PANEL: CPT

## 2025-07-31 NOTE — TELEPHONE ENCOUNTER
Cut lisinopril dose in half and check BMP day before upcoming appt with Dr Fiore  Educate on high potassium foods

## 2025-07-31 NOTE — TELEPHONE ENCOUNTER
Received call from Christianne PEDRAZA with [facility] making cardiology aware of patient recent potassium levels. Per Christianne potassium on 7/22 5.4 repeat potassium today 5.1. Patient with no symptoms and not on any supplements. Per Christianne patient was recently on a trial medication from urology that interfered with potassium but that has been stopped.

## 2025-08-01 ENCOUNTER — LAB (OUTPATIENT)
Dept: LAB | Facility: HOSPITAL | Age: 70
End: 2025-08-01
Payer: MEDICARE

## 2025-08-01 DIAGNOSIS — E87.5 HYPERKALEMIA: ICD-10-CM

## 2025-08-01 LAB
ANION GAP SERPL CALCULATED.3IONS-SCNC: 9.5 MMOL/L (ref 5–15)
BUN SERPL-MCNC: 16 MG/DL (ref 8–23)
BUN/CREAT SERPL: 14.4 (ref 7–25)
CALCIUM SPEC-SCNC: 9.4 MG/DL (ref 8.6–10.5)
CHLORIDE SERPL-SCNC: 106 MMOL/L (ref 98–107)
CO2 SERPL-SCNC: 24.5 MMOL/L (ref 22–29)
CREAT SERPL-MCNC: 1.11 MG/DL (ref 0.57–1)
EGFRCR SERPLBLD CKD-EPI 2021: 53.9 ML/MIN/1.73
GLUCOSE SERPL-MCNC: 88 MG/DL (ref 65–99)
MAGNESIUM UR-MCNC: <1.4 MG/DL
POTASSIUM SERPL-SCNC: 5 MMOL/L (ref 3.5–5.2)
SODIUM SERPL-SCNC: 140 MMOL/L (ref 136–145)

## 2025-08-01 PROCEDURE — 36415 COLL VENOUS BLD VENIPUNCTURE: CPT

## 2025-08-01 PROCEDURE — 80048 BASIC METABOLIC PNL TOTAL CA: CPT

## 2025-08-01 RX ORDER — LISINOPRIL 40 MG/1
20 TABLET ORAL DAILY
Start: 2025-08-01

## 2025-08-06 ENCOUNTER — OFFICE VISIT (OUTPATIENT)
Dept: CARDIOLOGY | Facility: CLINIC | Age: 70
End: 2025-08-06
Payer: MEDICARE

## 2025-08-06 ENCOUNTER — CLINICAL SUPPORT NO REQUIREMENTS (OUTPATIENT)
Dept: CARDIOLOGY | Facility: CLINIC | Age: 70
End: 2025-08-06
Payer: MEDICARE

## 2025-08-06 VITALS
DIASTOLIC BLOOD PRESSURE: 69 MMHG | HEIGHT: 66 IN | HEART RATE: 64 BPM | BODY MASS INDEX: 33.59 KG/M2 | SYSTOLIC BLOOD PRESSURE: 150 MMHG | WEIGHT: 209 LBS

## 2025-08-06 DIAGNOSIS — Z95.0 PRESENCE OF CARDIAC PACEMAKER: ICD-10-CM

## 2025-08-06 DIAGNOSIS — I10 ESSENTIAL HYPERTENSION: Primary | ICD-10-CM

## 2025-08-06 DIAGNOSIS — I50.22 CHRONIC HFREF (HEART FAILURE WITH REDUCED EJECTION FRACTION): ICD-10-CM

## 2025-08-06 PROBLEM — E87.5 HYPERKALEMIA: Status: ACTIVE | Noted: 2025-08-06

## 2025-08-06 RX ORDER — LISINOPRIL 20 MG/1
20 TABLET ORAL DAILY
Qty: 90 TABLET | Refills: 3 | Status: SHIPPED | OUTPATIENT
Start: 2025-08-06

## 2025-08-06 RX ORDER — AMOXICILLIN 875 MG/1
875 TABLET, COATED ORAL 2 TIMES DAILY
COMMUNITY
End: 2025-08-11

## 2025-08-06 RX ORDER — MONTELUKAST SODIUM 10 MG/1
10 TABLET ORAL DAILY
COMMUNITY

## 2025-08-06 RX ORDER — AMLODIPINE BESYLATE 10 MG/1
10 TABLET ORAL DAILY
Qty: 90 TABLET | Refills: 3 | Status: SHIPPED | OUTPATIENT
Start: 2025-08-06

## 2025-08-06 RX ORDER — LORATADINE 10 MG/1
10 TABLET ORAL DAILY
COMMUNITY

## 2025-08-11 ENCOUNTER — OFFICE VISIT (OUTPATIENT)
Dept: PULMONOLOGY | Facility: CLINIC | Age: 70
End: 2025-08-11
Payer: MEDICARE

## 2025-08-11 VITALS
OXYGEN SATURATION: 98 % | DIASTOLIC BLOOD PRESSURE: 85 MMHG | HEIGHT: 66 IN | TEMPERATURE: 98.4 F | WEIGHT: 211 LBS | SYSTOLIC BLOOD PRESSURE: 138 MMHG | HEART RATE: 77 BPM | BODY MASS INDEX: 33.91 KG/M2 | RESPIRATION RATE: 16 BRPM

## 2025-08-11 DIAGNOSIS — F17.201 TOBACCO ABUSE, IN REMISSION: ICD-10-CM

## 2025-08-11 DIAGNOSIS — J30.2 SEASONAL ALLERGIES: ICD-10-CM

## 2025-08-11 DIAGNOSIS — J44.9 CHRONIC OBSTRUCTIVE PULMONARY DISEASE, UNSPECIFIED COPD TYPE: ICD-10-CM

## 2025-08-11 DIAGNOSIS — K21.9 GASTROESOPHAGEAL REFLUX DISEASE, UNSPECIFIED WHETHER ESOPHAGITIS PRESENT: ICD-10-CM

## 2025-08-11 DIAGNOSIS — G47.8 UPPER AIRWAY RESISTANCE SYNDROME: ICD-10-CM

## 2025-08-11 DIAGNOSIS — R05.9 COUGH, UNSPECIFIED TYPE: ICD-10-CM

## 2025-08-11 DIAGNOSIS — R91.1 LUNG NODULE: Primary | ICD-10-CM

## 2025-08-11 PROCEDURE — 1160F RVW MEDS BY RX/DR IN RCRD: CPT | Performed by: NURSE PRACTITIONER

## 2025-08-11 PROCEDURE — 99214 OFFICE O/P EST MOD 30 MIN: CPT | Performed by: NURSE PRACTITIONER

## 2025-08-11 PROCEDURE — 3075F SYST BP GE 130 - 139MM HG: CPT | Performed by: NURSE PRACTITIONER

## 2025-08-11 PROCEDURE — 1159F MED LIST DOCD IN RCRD: CPT | Performed by: NURSE PRACTITIONER

## 2025-08-11 PROCEDURE — G2211 COMPLEX E/M VISIT ADD ON: HCPCS | Performed by: NURSE PRACTITIONER

## 2025-08-11 PROCEDURE — 3079F DIAST BP 80-89 MM HG: CPT | Performed by: NURSE PRACTITIONER

## 2025-08-11 RX ORDER — BENZONATATE 100 MG/1
100 CAPSULE ORAL 3 TIMES DAILY PRN
Qty: 42 CAPSULE | Refills: 0 | Status: SHIPPED | OUTPATIENT
Start: 2025-08-11

## 2025-08-12 LAB
MC_CV_MDC_IDC_RATE_1: 160
MC_CV_MDC_IDC_ZONE_ID: 1
MC_CV_MDC_IDC_ZONE_ID: 2
MC_CV_MDC_IDC_ZONE_ID: 3
MDC_IDC_MSMT_BATTERY_REMAINING_LONGEVITY: 62 MO
MDC_IDC_MSMT_BATTERY_STATUS: NORMAL
MDC_IDC_MSMT_LEADCHNL_RA_DTM: NORMAL
MDC_IDC_MSMT_LEADCHNL_RA_IMPEDANCE_VALUE: 631
MDC_IDC_MSMT_LEADCHNL_RA_PACING_THRESHOLD_AMPLITUDE: 0.6
MDC_IDC_MSMT_LEADCHNL_RA_PACING_THRESHOLD_PULSEWIDTH: 0.4
MDC_IDC_MSMT_LEADCHNL_RA_SENSING_INTR_AMPL: 8.2
MDC_IDC_MSMT_LEADCHNL_RV_DTM: NORMAL
MDC_IDC_MSMT_LEADCHNL_RV_IMPEDANCE_VALUE: 801
MDC_IDC_MSMT_LEADCHNL_RV_PACING_THRESHOLD_AMPLITUDE: 1
MDC_IDC_MSMT_LEADCHNL_RV_PACING_THRESHOLD_PULSEWIDTH: 0.4
MDC_IDC_PG_IMPLANT_DTM: NORMAL
MDC_IDC_PG_MFG: NORMAL
MDC_IDC_PG_MODEL: NORMAL
MDC_IDC_PG_SERIAL: NORMAL
MDC_IDC_PG_TYPE: NORMAL
MDC_IDC_SESS_DTM: NORMAL
MDC_IDC_SET_BRADY_AT_MODE_SWITCH_RATE: 170
MDC_IDC_SET_BRADY_LOWRATE: 60
MDC_IDC_SET_BRADY_MAX_SENSOR_RATE: 130
MDC_IDC_SET_BRADY_MAX_TRACKING_RATE: 130
MDC_IDC_SET_BRADY_MODE: NORMAL
MDC_IDC_SET_BRADY_PAV_DELAY: 250
MDC_IDC_SET_BRADY_SAV_DELAY: 240
MDC_IDC_SET_LEADCHNL_RA_PACING_AMPLITUDE: 2.2
MDC_IDC_SET_LEADCHNL_RA_PACING_PULSEWIDTH: 0.4
MDC_IDC_SET_LEADCHNL_RA_SENSING_SENSITIVITY: 0.75
MDC_IDC_SET_LEADCHNL_RV_PACING_AMPLITUDE: 2.2
MDC_IDC_SET_LEADCHNL_RV_PACING_PULSEWIDTH: 0.4
MDC_IDC_SET_LEADCHNL_RV_SENSING_SENSITIVITY: 2.5
MDC_IDC_SET_ZONE_STATUS: NORMAL
MDC_IDC_SET_ZONE_TYPE: NORMAL
MDC_IDC_STAT_BRADY_RA_PERCENT_PACED: 34
MDC_IDC_STAT_BRADY_RV_PERCENT_PACED: 87

## 2025-08-15 LAB
MC_CV_MDC_IDC_RATE_1: 160
MC_CV_MDC_IDC_ZONE_ID: 1
MDC_IDC_MSMT_BATTERY_REMAINING_LONGEVITY: 54 MO
MDC_IDC_MSMT_BATTERY_REMAINING_PERCENTAGE: 91 %
MDC_IDC_MSMT_BATTERY_STATUS: NORMAL
MDC_IDC_MSMT_LEADCHNL_RA_DTM: NORMAL
MDC_IDC_MSMT_LEADCHNL_RA_IMPEDANCE_VALUE: 657
MDC_IDC_MSMT_LEADCHNL_RA_PACING_THRESHOLD_AMPLITUDE: 0.5
MDC_IDC_MSMT_LEADCHNL_RA_PACING_THRESHOLD_POLARITY: NORMAL
MDC_IDC_MSMT_LEADCHNL_RA_PACING_THRESHOLD_PULSEWIDTH: 0.4
MDC_IDC_MSMT_LEADCHNL_RA_SENSING_INTR_AMPL: 10
MDC_IDC_MSMT_LEADCHNL_RV_DTM: NORMAL
MDC_IDC_MSMT_LEADCHNL_RV_IMPEDANCE_VALUE: 858
MDC_IDC_MSMT_LEADCHNL_RV_PACING_THRESHOLD_AMPLITUDE: 0.6
MDC_IDC_MSMT_LEADCHNL_RV_PACING_THRESHOLD_POLARITY: NORMAL
MDC_IDC_MSMT_LEADCHNL_RV_PACING_THRESHOLD_PULSEWIDTH: 0.4
MDC_IDC_PG_IMPLANT_DTM: NORMAL
MDC_IDC_PG_MFG: NORMAL
MDC_IDC_PG_MODEL: NORMAL
MDC_IDC_PG_SERIAL: NORMAL
MDC_IDC_PG_TYPE: NORMAL
MDC_IDC_SESS_DTM: NORMAL
MDC_IDC_SESS_TYPE: NORMAL
MDC_IDC_SET_BRADY_AT_MODE_SWITCH_RATE: 170
MDC_IDC_SET_BRADY_LOWRATE: 60
MDC_IDC_SET_BRADY_MAX_SENSOR_RATE: 130
MDC_IDC_SET_BRADY_MAX_TRACKING_RATE: 130
MDC_IDC_SET_BRADY_MODE: NORMAL
MDC_IDC_SET_BRADY_PAV_DELAY: 220
MDC_IDC_SET_BRADY_SAV_DELAY: 210
MDC_IDC_SET_LEADCHNL_RA_PACING_AMPLITUDE: 2.2
MDC_IDC_SET_LEADCHNL_RA_PACING_POLARITY: NORMAL
MDC_IDC_SET_LEADCHNL_RA_PACING_PULSEWIDTH: 0.4
MDC_IDC_SET_LEADCHNL_RA_SENSING_POLARITY: NORMAL
MDC_IDC_SET_LEADCHNL_RA_SENSING_SENSITIVITY: 0.75
MDC_IDC_SET_LEADCHNL_RV_PACING_AMPLITUDE: 2.2
MDC_IDC_SET_LEADCHNL_RV_PACING_POLARITY: NORMAL
MDC_IDC_SET_LEADCHNL_RV_PACING_PULSEWIDTH: 0.4
MDC_IDC_SET_LEADCHNL_RV_SENSING_POLARITY: NORMAL
MDC_IDC_SET_LEADCHNL_RV_SENSING_SENSITIVITY: 2.5
MDC_IDC_SET_ZONE_STATUS: NORMAL
MDC_IDC_SET_ZONE_TYPE: NORMAL
MDC_IDC_STAT_AT_BURDEN_PERCENT: 0
MDC_IDC_STAT_BRADY_RA_PERCENT_PACED: 30
MDC_IDC_STAT_BRADY_RV_PERCENT_PACED: 96

## (undated) DEVICE — SUT SILK 0/0 CT2 18IN C027D

## (undated) DEVICE — ST ACC MICROPUNCTURE .018 TRANSLSS/PLAT/TP 4F/10CM 21G/10CM

## (undated) DEVICE — APPL CHLORAPREP HI/LITE 26ML ORNG

## (undated) DEVICE — BLD CLIP UNIV SURG GRY

## (undated) DEVICE — Device

## (undated) DEVICE — CONTAINER,SPECIMEN,O.R.STRL,4.5OZ: Brand: MEDLINE

## (undated) DEVICE — GLV SURG SENSICARE SLT PF LF 8 STRL

## (undated) DEVICE — SOL IRRG H2O PL/BG 1000ML STRL

## (undated) DEVICE — LINER SURG CANSTR SXN S/RIGD 1500CC

## (undated) DEVICE — Device: Brand: DEFENDO AIR/WATER/SUCTION AND BIOPSY VALVE

## (undated) DEVICE — 3M™ STERI-STRIP™ REINFORCED ADHESIVE SKIN CLOSURES, R1546, 1/4 IN X 4 IN (6 MM X 100 MM), 10 STRIPS/ENVELOPE: Brand: 3M™ STERI-STRIP™

## (undated) DEVICE — DRSNG SURG AQUACEL AG 9X15CM

## (undated) DEVICE — GLV SURG SENSICARE SLT PF LF 7.5 STRL

## (undated) DEVICE — SINGLE-USE BIOPSY FORCEPS: Brand: RADIAL JAW 4

## (undated) DEVICE — CVR PROB ULTRASND GLS STRL

## (undated) DEVICE — CATH F4 INF JR 4 100CM: Brand: INFINITI

## (undated) DEVICE — Device: Brand: PORTEX

## (undated) DEVICE — ANTIBACTERIAL UNDYED BRAIDED (POLYGLACTIN 910), SYNTHETIC ABSORBABLE SUTURE: Brand: COATED VICRYL

## (undated) DEVICE — CONN JET HYDRA H20 AUXILIARY DISP

## (undated) DEVICE — AVANTI + 4F STD W/GW: Brand: AVANTI

## (undated) DEVICE — CATH F4 INF JL 4 100CM: Brand: INFINITI

## (undated) DEVICE — SOLIDIFIER LIQLOC PLS 1500CC BT

## (undated) DEVICE — SHT AIR TRANSFR COMFRT GLIDE LAT 40X80IN

## (undated) DEVICE — BLCK/BITE BLOX WO/DENTL/RIM W/STRAP 54F

## (undated) DEVICE — MODEL AT P65, P/N 701554-001KIT CONTENTS: HAND CONTROLLER, 3-WAY HIGH-PRESSURE STOPCOCK WITH ROTATING END AND PREMIUM HIGH-PRESSURE TUBING: Brand: ANGIOTOUCH® KIT

## (undated) DEVICE — KT MANIFOLD CATHLAB CUST

## (undated) DEVICE — INTRO TEAR AWAY/LVD W/SD PRT 6F 13CM

## (undated) DEVICE — UNDYED BRAIDED (POLYGLACTIN 910), SYNTHETIC ABSORBABLE SUTURE: Brand: COATED VICRYL

## (undated) DEVICE — MODEL BT2000 P/N 700287-012KIT CONTENTS: MANIFOLD WITH SALINE AND CONTRAST PORTS, SALINE TUBING WITH SPIKE AND HAND SYRINGE, TRANSDUCER: Brand: BT2000 AUTOMATED MANIFOLD KIT

## (undated) DEVICE — DRSNG SURESITE WNDW 4X4.5

## (undated) DEVICE — CUFF,BP,DISP,1 TB,ADLT LNG,HP: Brand: MEDLINE

## (undated) DEVICE — 8 FOOT DISPOSABLE EXTENSION CABLE WITH SAFE CONNECT / ALLIGATOR CLIP

## (undated) DEVICE — CONTRST ISOVUE370 76PCT 100ML

## (undated) DEVICE — PK CATH LAB 60

## (undated) DEVICE — A2000 MULTI-USE SYRINGE KIT, P/N 701277-003KIT CONTENTS: 100ML CONTRAST RESERVOIR AND TUBING WITH CONTRAST SPIKE AND CLAMP: Brand: A2000 MULTI-USE SYRINGE KIT

## (undated) DEVICE — DECANTER: Brand: UNBRANDED

## (undated) DEVICE — CATH 4F INF PIG 145Â° 110 CM: Brand: INFINITI